# Patient Record
Sex: FEMALE | Race: WHITE | NOT HISPANIC OR LATINO | Employment: OTHER
[De-identification: names, ages, dates, MRNs, and addresses within clinical notes are randomized per-mention and may not be internally consistent; named-entity substitution may affect disease eponyms.]

---

## 2017-01-09 ENCOUNTER — RECORDS - HEALTHEAST (OUTPATIENT)
Dept: ADMINISTRATIVE | Facility: OTHER | Age: 77
End: 2017-01-09

## 2017-01-09 ENCOUNTER — COMMUNICATION - HEALTHEAST (OUTPATIENT)
Dept: SCHEDULING | Facility: CLINIC | Age: 77
End: 2017-01-09

## 2017-01-17 ENCOUNTER — OFFICE VISIT - HEALTHEAST (OUTPATIENT)
Dept: FAMILY MEDICINE | Facility: CLINIC | Age: 77
End: 2017-01-17

## 2017-01-17 DIAGNOSIS — Z01.818 PREOP GENERAL PHYSICAL EXAM: ICD-10-CM

## 2017-01-17 DIAGNOSIS — E11.9 DIABETES MELLITUS (H): ICD-10-CM

## 2017-01-17 DIAGNOSIS — Z12.31 VISIT FOR SCREENING MAMMOGRAM: ICD-10-CM

## 2017-01-17 DIAGNOSIS — K57.92 DIVERTICULITIS: ICD-10-CM

## 2017-01-17 DIAGNOSIS — H26.9 CATARACT: ICD-10-CM

## 2017-01-17 LAB — HBA1C MFR BLD: 6.8 % (ref 3.5–6)

## 2017-01-17 ASSESSMENT — MIFFLIN-ST. JEOR: SCORE: 1427.77

## 2017-01-18 ENCOUNTER — COMMUNICATION - HEALTHEAST (OUTPATIENT)
Dept: FAMILY MEDICINE | Facility: CLINIC | Age: 77
End: 2017-01-18

## 2017-01-18 DIAGNOSIS — K57.92 DIVERTICULITIS: ICD-10-CM

## 2017-04-13 ENCOUNTER — COMMUNICATION - HEALTHEAST (OUTPATIENT)
Dept: FAMILY MEDICINE | Facility: CLINIC | Age: 77
End: 2017-04-13

## 2017-05-02 ENCOUNTER — COMMUNICATION - HEALTHEAST (OUTPATIENT)
Dept: FAMILY MEDICINE | Facility: CLINIC | Age: 77
End: 2017-05-02

## 2017-05-09 ENCOUNTER — OFFICE VISIT - HEALTHEAST (OUTPATIENT)
Dept: FAMILY MEDICINE | Facility: CLINIC | Age: 77
End: 2017-05-09

## 2017-05-09 DIAGNOSIS — E11.9 DIABETES TYPE 2, CONTROLLED (H): ICD-10-CM

## 2017-05-31 ENCOUNTER — COMMUNICATION - HEALTHEAST (OUTPATIENT)
Dept: FAMILY MEDICINE | Facility: CLINIC | Age: 77
End: 2017-05-31

## 2017-05-31 DIAGNOSIS — E11.9 DIABETES TYPE 2, CONTROLLED (H): ICD-10-CM

## 2017-06-12 ENCOUNTER — COMMUNICATION - HEALTHEAST (OUTPATIENT)
Dept: FAMILY MEDICINE | Facility: CLINIC | Age: 77
End: 2017-06-12

## 2017-06-21 ENCOUNTER — AMBULATORY - HEALTHEAST (OUTPATIENT)
Dept: FAMILY MEDICINE | Facility: CLINIC | Age: 77
End: 2017-06-21

## 2017-06-21 ENCOUNTER — OFFICE VISIT - HEALTHEAST (OUTPATIENT)
Dept: FAMILY MEDICINE | Facility: CLINIC | Age: 77
End: 2017-06-21

## 2017-06-21 DIAGNOSIS — E78.00 HYPERCHOLESTEROLEMIA: ICD-10-CM

## 2017-06-21 DIAGNOSIS — E11.9 DIABETES TYPE 2, CONTROLLED (H): ICD-10-CM

## 2017-06-21 LAB
CHOLEST SERPL-MCNC: 143 MG/DL
FASTING STATUS PATIENT QL REPORTED: ABNORMAL
HBA1C MFR BLD: 7.6 % (ref 3.5–6)
HDLC SERPL-MCNC: 42 MG/DL
LDLC SERPL CALC-MCNC: 73 MG/DL

## 2017-06-21 RX ORDER — LATANOPROST 50 UG/ML
1 SOLUTION/ DROPS OPHTHALMIC AT BEDTIME
Status: SHIPPED | COMMUNITY
Start: 2017-06-12

## 2017-06-21 ASSESSMENT — MIFFLIN-ST. JEOR: SCORE: 1492.97

## 2017-06-22 ENCOUNTER — COMMUNICATION - HEALTHEAST (OUTPATIENT)
Dept: FAMILY MEDICINE | Facility: CLINIC | Age: 77
End: 2017-06-22

## 2017-07-26 ENCOUNTER — OFFICE VISIT - HEALTHEAST (OUTPATIENT)
Dept: NURSING | Facility: CLINIC | Age: 77
End: 2017-07-26

## 2017-07-26 DIAGNOSIS — E11.9 CONTROLLED TYPE 2 DIABETES MELLITUS WITHOUT COMPLICATION, WITH LONG-TERM CURRENT USE OF INSULIN (H): ICD-10-CM

## 2017-07-26 DIAGNOSIS — E11.9 DIABETES MELLITUS TYPE 2 IN NONOBESE (H): ICD-10-CM

## 2017-07-26 DIAGNOSIS — I10 ESSENTIAL HYPERTENSION: ICD-10-CM

## 2017-07-26 DIAGNOSIS — Z79.4 CONTROLLED TYPE 2 DIABETES MELLITUS WITHOUT COMPLICATION, WITH LONG-TERM CURRENT USE OF INSULIN (H): ICD-10-CM

## 2017-07-26 RX ORDER — LANCETS 30 GAUGE
EACH MISCELLANEOUS
Qty: 100 EACH | Refills: 11 | Status: SHIPPED | OUTPATIENT
Start: 2017-07-26 | End: 2023-07-20

## 2017-08-09 ENCOUNTER — COMMUNICATION - HEALTHEAST (OUTPATIENT)
Dept: FAMILY MEDICINE | Facility: CLINIC | Age: 77
End: 2017-08-09

## 2017-08-11 ENCOUNTER — COMMUNICATION - HEALTHEAST (OUTPATIENT)
Dept: FAMILY MEDICINE | Facility: CLINIC | Age: 77
End: 2017-08-11

## 2017-09-08 ENCOUNTER — COMMUNICATION - HEALTHEAST (OUTPATIENT)
Dept: NURSING | Facility: CLINIC | Age: 77
End: 2017-09-08

## 2017-09-12 ENCOUNTER — COMMUNICATION - HEALTHEAST (OUTPATIENT)
Dept: FAMILY MEDICINE | Facility: CLINIC | Age: 77
End: 2017-09-12

## 2017-09-12 DIAGNOSIS — E11.9 CONTROLLED TYPE 2 DIABETES MELLITUS WITHOUT COMPLICATION, WITH LONG-TERM CURRENT USE OF INSULIN (H): ICD-10-CM

## 2017-09-12 DIAGNOSIS — Z79.4 CONTROLLED TYPE 2 DIABETES MELLITUS WITHOUT COMPLICATION, WITH LONG-TERM CURRENT USE OF INSULIN (H): ICD-10-CM

## 2017-09-12 DIAGNOSIS — E78.00 HYPERCHOLESTEROLEMIA: ICD-10-CM

## 2017-09-14 ENCOUNTER — RECORDS - HEALTHEAST (OUTPATIENT)
Dept: ADMINISTRATIVE | Facility: OTHER | Age: 77
End: 2017-09-14

## 2017-09-14 ENCOUNTER — COMMUNICATION - HEALTHEAST (OUTPATIENT)
Dept: FAMILY MEDICINE | Facility: CLINIC | Age: 77
End: 2017-09-14

## 2017-09-18 ENCOUNTER — RECORDS - HEALTHEAST (OUTPATIENT)
Dept: ADMINISTRATIVE | Facility: OTHER | Age: 77
End: 2017-09-18

## 2017-10-03 ENCOUNTER — OFFICE VISIT - HEALTHEAST (OUTPATIENT)
Dept: FAMILY MEDICINE | Facility: CLINIC | Age: 77
End: 2017-10-03

## 2017-10-03 DIAGNOSIS — H26.9 CATARACT: ICD-10-CM

## 2017-10-03 DIAGNOSIS — Z01.818 PREOP GENERAL PHYSICAL EXAM: ICD-10-CM

## 2017-10-03 DIAGNOSIS — E11.9 DIABETES TYPE 2, CONTROLLED (H): ICD-10-CM

## 2017-10-03 LAB — HBA1C MFR BLD: 7.2 % (ref 3.5–6)

## 2017-10-03 ASSESSMENT — MIFFLIN-ST. JEOR: SCORE: 1460.92

## 2017-10-05 ENCOUNTER — COMMUNICATION - HEALTHEAST (OUTPATIENT)
Dept: FAMILY MEDICINE | Facility: CLINIC | Age: 77
End: 2017-10-05

## 2017-10-09 ENCOUNTER — RECORDS - HEALTHEAST (OUTPATIENT)
Dept: MAMMOGRAPHY | Facility: CLINIC | Age: 77
End: 2017-10-09

## 2017-10-09 DIAGNOSIS — Z12.31 ENCOUNTER FOR SCREENING MAMMOGRAM FOR MALIGNANT NEOPLASM OF BREAST: ICD-10-CM

## 2017-10-30 ENCOUNTER — COMMUNICATION - HEALTHEAST (OUTPATIENT)
Dept: FAMILY MEDICINE | Facility: CLINIC | Age: 77
End: 2017-10-30

## 2017-10-30 DIAGNOSIS — Z79.4 CONTROLLED TYPE 2 DIABETES MELLITUS WITHOUT COMPLICATION, WITH LONG-TERM CURRENT USE OF INSULIN (H): ICD-10-CM

## 2017-10-30 DIAGNOSIS — E11.9 CONTROLLED TYPE 2 DIABETES MELLITUS WITHOUT COMPLICATION, WITH LONG-TERM CURRENT USE OF INSULIN (H): ICD-10-CM

## 2017-11-02 ENCOUNTER — COMMUNICATION - HEALTHEAST (OUTPATIENT)
Dept: FAMILY MEDICINE | Facility: CLINIC | Age: 77
End: 2017-11-02

## 2017-11-02 DIAGNOSIS — E11.9 CONTROLLED TYPE 2 DIABETES MELLITUS WITHOUT COMPLICATION, WITH LONG-TERM CURRENT USE OF INSULIN (H): ICD-10-CM

## 2017-11-02 DIAGNOSIS — Z79.4 CONTROLLED TYPE 2 DIABETES MELLITUS WITHOUT COMPLICATION, WITH LONG-TERM CURRENT USE OF INSULIN (H): ICD-10-CM

## 2017-12-06 ENCOUNTER — COMMUNICATION - HEALTHEAST (OUTPATIENT)
Dept: FAMILY MEDICINE | Facility: CLINIC | Age: 77
End: 2017-12-06

## 2017-12-06 DIAGNOSIS — Z79.4 CONTROLLED TYPE 2 DIABETES MELLITUS WITHOUT COMPLICATION, WITH LONG-TERM CURRENT USE OF INSULIN (H): ICD-10-CM

## 2017-12-06 DIAGNOSIS — E11.9 CONTROLLED TYPE 2 DIABETES MELLITUS WITHOUT COMPLICATION, WITH LONG-TERM CURRENT USE OF INSULIN (H): ICD-10-CM

## 2018-01-02 ENCOUNTER — COMMUNICATION - HEALTHEAST (OUTPATIENT)
Dept: NURSING | Facility: CLINIC | Age: 78
End: 2018-01-02

## 2018-02-08 ENCOUNTER — OFFICE VISIT - HEALTHEAST (OUTPATIENT)
Dept: FAMILY MEDICINE | Facility: CLINIC | Age: 78
End: 2018-02-08

## 2018-02-08 ENCOUNTER — AMBULATORY - HEALTHEAST (OUTPATIENT)
Dept: FAMILY MEDICINE | Facility: CLINIC | Age: 78
End: 2018-02-08

## 2018-02-08 DIAGNOSIS — M25.50 JOINT PAIN: ICD-10-CM

## 2018-02-08 DIAGNOSIS — E11.9 DIABETES TYPE 2, CONTROLLED (H): ICD-10-CM

## 2018-02-08 DIAGNOSIS — Z00.00 ROUTINE GENERAL MEDICAL EXAMINATION AT A HEALTH CARE FACILITY: ICD-10-CM

## 2018-02-08 LAB — HBA1C MFR BLD: 7.4 % (ref 3.5–6)

## 2018-02-08 ASSESSMENT — MIFFLIN-ST. JEOR: SCORE: 1479.04

## 2018-03-09 ENCOUNTER — COMMUNICATION - HEALTHEAST (OUTPATIENT)
Dept: FAMILY MEDICINE | Facility: CLINIC | Age: 78
End: 2018-03-09

## 2018-03-09 DIAGNOSIS — E11.9 CONTROLLED TYPE 2 DIABETES MELLITUS WITHOUT COMPLICATION, WITH LONG-TERM CURRENT USE OF INSULIN (H): ICD-10-CM

## 2018-03-09 DIAGNOSIS — Z79.4 CONTROLLED TYPE 2 DIABETES MELLITUS WITHOUT COMPLICATION, WITH LONG-TERM CURRENT USE OF INSULIN (H): ICD-10-CM

## 2018-04-09 ENCOUNTER — COMMUNICATION - HEALTHEAST (OUTPATIENT)
Dept: FAMILY MEDICINE | Facility: CLINIC | Age: 78
End: 2018-04-09

## 2018-04-11 ENCOUNTER — COMMUNICATION - HEALTHEAST (OUTPATIENT)
Dept: FAMILY MEDICINE | Facility: CLINIC | Age: 78
End: 2018-04-11

## 2018-04-22 ENCOUNTER — RECORDS - HEALTHEAST (OUTPATIENT)
Dept: ADMINISTRATIVE | Facility: OTHER | Age: 78
End: 2018-04-22

## 2018-04-24 ENCOUNTER — COMMUNICATION - HEALTHEAST (OUTPATIENT)
Dept: SCHEDULING | Facility: CLINIC | Age: 78
End: 2018-04-24

## 2018-04-24 ENCOUNTER — OFFICE VISIT - HEALTHEAST (OUTPATIENT)
Dept: FAMILY MEDICINE | Facility: CLINIC | Age: 78
End: 2018-04-24

## 2018-04-24 DIAGNOSIS — R06.02 SOB (SHORTNESS OF BREATH): ICD-10-CM

## 2018-04-24 ASSESSMENT — MIFFLIN-ST. JEOR: SCORE: 1471.98

## 2018-05-07 ENCOUNTER — HOSPITAL ENCOUNTER (OUTPATIENT)
Dept: RESPIRATORY THERAPY | Facility: HOSPITAL | Age: 78
Discharge: HOME | End: 2018-05-07
Attending: FAMILY MEDICINE

## 2018-05-07 DIAGNOSIS — R06.02 SOB (SHORTNESS OF BREATH): ICD-10-CM

## 2018-05-07 LAB — HGB BLD-MCNC: 14.1 G/DL (ref 12–16)

## 2018-05-10 ENCOUNTER — AMBULATORY - HEALTHEAST (OUTPATIENT)
Dept: PODIATRY | Facility: CLINIC | Age: 78
End: 2018-05-10

## 2018-05-10 DIAGNOSIS — E11.9 TYPE 2 DIABETES MELLITUS WITHOUT COMPLICATION, WITH LONG-TERM CURRENT USE OF INSULIN (H): ICD-10-CM

## 2018-05-10 DIAGNOSIS — Z79.4 TYPE 2 DIABETES MELLITUS WITHOUT COMPLICATION, WITH LONG-TERM CURRENT USE OF INSULIN (H): ICD-10-CM

## 2018-05-10 DIAGNOSIS — B35.1 NAIL FUNGUS: ICD-10-CM

## 2018-05-10 DIAGNOSIS — L60.2 ONYCHAUXIS: ICD-10-CM

## 2018-05-16 ENCOUNTER — AMBULATORY - HEALTHEAST (OUTPATIENT)
Dept: LAB | Facility: CLINIC | Age: 78
End: 2018-05-16

## 2018-05-16 ENCOUNTER — OFFICE VISIT - HEALTHEAST (OUTPATIENT)
Dept: PHARMACY | Facility: CLINIC | Age: 78
End: 2018-05-16

## 2018-05-16 ENCOUNTER — AMBULATORY - HEALTHEAST (OUTPATIENT)
Dept: PHARMACY | Facility: CLINIC | Age: 78
End: 2018-05-16

## 2018-05-16 DIAGNOSIS — Z79.4 CONTROLLED TYPE 2 DIABETES MELLITUS WITHOUT COMPLICATION, WITH LONG-TERM CURRENT USE OF INSULIN (H): ICD-10-CM

## 2018-05-16 DIAGNOSIS — E11.9 DIABETES MELLITUS (H): ICD-10-CM

## 2018-05-16 DIAGNOSIS — E11.9 CONTROLLED TYPE 2 DIABETES MELLITUS WITHOUT COMPLICATION, WITH LONG-TERM CURRENT USE OF INSULIN (H): ICD-10-CM

## 2018-05-16 LAB — HBA1C MFR BLD: 7.7 % (ref 3.5–6)

## 2018-05-23 ENCOUNTER — RECORDS - HEALTHEAST (OUTPATIENT)
Dept: ADMINISTRATIVE | Facility: OTHER | Age: 78
End: 2018-05-23

## 2018-05-25 ENCOUNTER — OFFICE VISIT - HEALTHEAST (OUTPATIENT)
Dept: EDUCATION SERVICES | Facility: CLINIC | Age: 78
End: 2018-05-25

## 2018-05-25 DIAGNOSIS — E11.9 DIABETES (H): ICD-10-CM

## 2018-05-27 ENCOUNTER — RECORDS - HEALTHEAST (OUTPATIENT)
Dept: ADMINISTRATIVE | Facility: OTHER | Age: 78
End: 2018-05-27

## 2018-05-29 ENCOUNTER — RECORDS - HEALTHEAST (OUTPATIENT)
Dept: ADMINISTRATIVE | Facility: OTHER | Age: 78
End: 2018-05-29

## 2018-05-30 ENCOUNTER — RECORDS - HEALTHEAST (OUTPATIENT)
Dept: ADMINISTRATIVE | Facility: OTHER | Age: 78
End: 2018-05-30

## 2018-06-08 ENCOUNTER — RECORDS - HEALTHEAST (OUTPATIENT)
Dept: ADMINISTRATIVE | Facility: OTHER | Age: 78
End: 2018-06-08

## 2018-06-13 ENCOUNTER — RECORDS - HEALTHEAST (OUTPATIENT)
Dept: ADMINISTRATIVE | Facility: OTHER | Age: 78
End: 2018-06-13

## 2018-06-25 ENCOUNTER — COMMUNICATION - HEALTHEAST (OUTPATIENT)
Dept: FAMILY MEDICINE | Facility: CLINIC | Age: 78
End: 2018-06-25

## 2018-06-25 DIAGNOSIS — E78.00 HYPERCHOLESTEROLEMIA: ICD-10-CM

## 2018-06-27 ENCOUNTER — RECORDS - HEALTHEAST (OUTPATIENT)
Dept: ADMINISTRATIVE | Facility: OTHER | Age: 78
End: 2018-06-27

## 2018-07-09 ENCOUNTER — OFFICE VISIT - HEALTHEAST (OUTPATIENT)
Dept: FAMILY MEDICINE | Facility: CLINIC | Age: 78
End: 2018-07-09

## 2018-07-09 DIAGNOSIS — R06.02 SOB (SHORTNESS OF BREATH): ICD-10-CM

## 2018-07-11 ENCOUNTER — OFFICE VISIT - HEALTHEAST (OUTPATIENT)
Dept: EDUCATION SERVICES | Facility: CLINIC | Age: 78
End: 2018-07-11

## 2018-07-11 DIAGNOSIS — E11.65 CONTROLLED TYPE 2 DIABETES MELLITUS WITH HYPERGLYCEMIA, WITH LONG-TERM CURRENT USE OF INSULIN (H): ICD-10-CM

## 2018-07-11 DIAGNOSIS — Z79.4 CONTROLLED TYPE 2 DIABETES MELLITUS WITH HYPERGLYCEMIA, WITH LONG-TERM CURRENT USE OF INSULIN (H): ICD-10-CM

## 2018-07-16 ENCOUNTER — COMMUNICATION - HEALTHEAST (OUTPATIENT)
Dept: EDUCATION SERVICES | Facility: CLINIC | Age: 78
End: 2018-07-16

## 2018-07-20 ENCOUNTER — RECORDS - HEALTHEAST (OUTPATIENT)
Dept: ADMINISTRATIVE | Facility: OTHER | Age: 78
End: 2018-07-20

## 2018-07-30 ENCOUNTER — COMMUNICATION - HEALTHEAST (OUTPATIENT)
Dept: NURSING | Facility: CLINIC | Age: 78
End: 2018-07-30

## 2018-07-30 DIAGNOSIS — I10 ESSENTIAL HYPERTENSION: ICD-10-CM

## 2018-08-08 ENCOUNTER — OFFICE VISIT - HEALTHEAST (OUTPATIENT)
Dept: EDUCATION SERVICES | Facility: CLINIC | Age: 78
End: 2018-08-08

## 2018-08-08 DIAGNOSIS — E11.9 DIABETES TYPE 2, CONTROLLED (H): ICD-10-CM

## 2018-09-05 ENCOUNTER — OFFICE VISIT - HEALTHEAST (OUTPATIENT)
Dept: EDUCATION SERVICES | Facility: CLINIC | Age: 78
End: 2018-09-05

## 2018-09-05 DIAGNOSIS — E11.9 DIABETES TYPE 2, CONTROLLED (H): ICD-10-CM

## 2018-09-14 ENCOUNTER — COMMUNICATION - HEALTHEAST (OUTPATIENT)
Dept: FAMILY MEDICINE | Facility: CLINIC | Age: 78
End: 2018-09-14

## 2018-09-14 DIAGNOSIS — E11.9 DIABETES TYPE 2, CONTROLLED (H): ICD-10-CM

## 2018-10-10 ENCOUNTER — AMBULATORY - HEALTHEAST (OUTPATIENT)
Dept: FAMILY MEDICINE | Facility: CLINIC | Age: 78
End: 2018-10-10

## 2018-10-10 ENCOUNTER — OFFICE VISIT - HEALTHEAST (OUTPATIENT)
Dept: FAMILY MEDICINE | Facility: CLINIC | Age: 78
End: 2018-10-10

## 2018-10-10 DIAGNOSIS — Z23 FLU VACCINE NEED: ICD-10-CM

## 2018-10-10 DIAGNOSIS — Z12.31 VISIT FOR SCREENING MAMMOGRAM: ICD-10-CM

## 2018-10-10 DIAGNOSIS — M54.50 CHRONIC BILATERAL LOW BACK PAIN WITHOUT SCIATICA: ICD-10-CM

## 2018-10-10 DIAGNOSIS — G89.29 CHRONIC BILATERAL LOW BACK PAIN WITHOUT SCIATICA: ICD-10-CM

## 2018-10-10 DIAGNOSIS — I10 ESSENTIAL HYPERTENSION: ICD-10-CM

## 2018-10-10 LAB
ALBUMIN SERPL-MCNC: 4.1 G/DL (ref 3.5–5)
ALP SERPL-CCNC: 79 U/L (ref 45–120)
ALT SERPL W P-5'-P-CCNC: 34 U/L (ref 0–45)
ANION GAP SERPL CALCULATED.3IONS-SCNC: 11 MMOL/L (ref 5–18)
AST SERPL W P-5'-P-CCNC: 32 U/L (ref 0–40)
BILIRUB SERPL-MCNC: 0.8 MG/DL (ref 0–1)
BUN SERPL-MCNC: 16 MG/DL (ref 8–28)
CALCIUM SERPL-MCNC: 9.4 MG/DL (ref 8.5–10.5)
CHLORIDE BLD-SCNC: 103 MMOL/L (ref 98–107)
CHOLEST SERPL-MCNC: 130 MG/DL
CO2 SERPL-SCNC: 25 MMOL/L (ref 22–31)
CREAT SERPL-MCNC: 0.84 MG/DL (ref 0.6–1.1)
FASTING STATUS PATIENT QL REPORTED: YES
GFR SERPL CREATININE-BSD FRML MDRD: >60 ML/MIN/1.73M2
GLUCOSE BLD-MCNC: 139 MG/DL (ref 70–125)
HBA1C MFR BLD: 7.5 % (ref 3.5–6)
HDLC SERPL-MCNC: 36 MG/DL
LDLC SERPL CALC-MCNC: 63 MG/DL
MAGNESIUM SERPL-MCNC: 1.9 MG/DL (ref 1.8–2.6)
POTASSIUM BLD-SCNC: 4 MMOL/L (ref 3.5–5)
PROT SERPL-MCNC: 7.2 G/DL (ref 6–8)
SODIUM SERPL-SCNC: 139 MMOL/L (ref 136–145)
TRIGL SERPL-MCNC: 154 MG/DL

## 2018-10-10 ASSESSMENT — MIFFLIN-ST. JEOR: SCORE: 1426.62

## 2018-10-11 ENCOUNTER — COMMUNICATION - HEALTHEAST (OUTPATIENT)
Dept: FAMILY MEDICINE | Facility: CLINIC | Age: 78
End: 2018-10-11

## 2018-10-23 ENCOUNTER — COMMUNICATION - HEALTHEAST (OUTPATIENT)
Dept: PHARMACY | Facility: CLINIC | Age: 78
End: 2018-10-23

## 2018-11-01 ENCOUNTER — COMMUNICATION - HEALTHEAST (OUTPATIENT)
Dept: NURSING | Facility: CLINIC | Age: 78
End: 2018-11-01

## 2018-11-01 DIAGNOSIS — I10 ESSENTIAL HYPERTENSION: ICD-10-CM

## 2018-11-01 DIAGNOSIS — E11.9 DIABETES TYPE 2, CONTROLLED (H): ICD-10-CM

## 2018-11-21 ENCOUNTER — RECORDS - HEALTHEAST (OUTPATIENT)
Dept: ADMINISTRATIVE | Facility: OTHER | Age: 78
End: 2018-11-21

## 2018-12-05 ENCOUNTER — OFFICE VISIT - HEALTHEAST (OUTPATIENT)
Dept: FAMILY MEDICINE | Facility: CLINIC | Age: 78
End: 2018-12-05

## 2018-12-05 DIAGNOSIS — R05.9 COUGH: ICD-10-CM

## 2018-12-05 DIAGNOSIS — L65.9 HAIR LOSS: ICD-10-CM

## 2018-12-05 DIAGNOSIS — Z78.0 POST-MENOPAUSAL: ICD-10-CM

## 2018-12-05 LAB
T3FREE SERPL-MCNC: 3.1 PG/ML (ref 1.9–3.9)
T4 FREE SERPL-MCNC: 0.8 NG/DL (ref 0.7–1.8)
TSH SERPL DL<=0.005 MIU/L-ACNC: 1.95 UIU/ML (ref 0.3–5)

## 2018-12-05 ASSESSMENT — MIFFLIN-ST. JEOR: SCORE: 1475.39

## 2018-12-11 ENCOUNTER — COMMUNICATION - HEALTHEAST (OUTPATIENT)
Dept: FAMILY MEDICINE | Facility: CLINIC | Age: 78
End: 2018-12-11

## 2018-12-11 DIAGNOSIS — E11.9 CONTROLLED TYPE 2 DIABETES MELLITUS WITHOUT COMPLICATION, WITH LONG-TERM CURRENT USE OF INSULIN (H): ICD-10-CM

## 2018-12-11 DIAGNOSIS — Z79.4 CONTROLLED TYPE 2 DIABETES MELLITUS WITHOUT COMPLICATION, WITH LONG-TERM CURRENT USE OF INSULIN (H): ICD-10-CM

## 2019-02-20 ENCOUNTER — COMMUNICATION - HEALTHEAST (OUTPATIENT)
Dept: FAMILY MEDICINE | Facility: CLINIC | Age: 79
End: 2019-02-20

## 2019-02-20 DIAGNOSIS — E11.9 DIABETES TYPE 2, CONTROLLED (H): ICD-10-CM

## 2019-03-06 ENCOUNTER — AMBULATORY - HEALTHEAST (OUTPATIENT)
Dept: PODIATRY | Facility: CLINIC | Age: 79
End: 2019-03-06

## 2019-03-06 RX ORDER — CLOBETASOL PROPIONATE 0.5 MG/ML
SOLUTION TOPICAL
Status: SHIPPED | COMMUNITY
Start: 2018-12-26

## 2019-03-07 ENCOUNTER — OFFICE VISIT - HEALTHEAST (OUTPATIENT)
Dept: PODIATRY | Facility: CLINIC | Age: 79
End: 2019-03-07

## 2019-03-07 DIAGNOSIS — L84 PRE-ULCERATIVE CALLUSES: ICD-10-CM

## 2019-03-07 ASSESSMENT — MIFFLIN-ST. JEOR: SCORE: 1471.98

## 2019-03-19 ENCOUNTER — COMMUNICATION - HEALTHEAST (OUTPATIENT)
Dept: FAMILY MEDICINE | Facility: CLINIC | Age: 79
End: 2019-03-19

## 2019-03-19 DIAGNOSIS — E78.00 HYPERCHOLESTEROLEMIA: ICD-10-CM

## 2019-03-20 ENCOUNTER — RECORDS - HEALTHEAST (OUTPATIENT)
Dept: ADMINISTRATIVE | Facility: OTHER | Age: 79
End: 2019-03-20

## 2019-03-21 ENCOUNTER — RECORDS - HEALTHEAST (OUTPATIENT)
Dept: ADMINISTRATIVE | Facility: OTHER | Age: 79
End: 2019-03-21

## 2019-04-02 ENCOUNTER — RECORDS - HEALTHEAST (OUTPATIENT)
Dept: ADMINISTRATIVE | Facility: OTHER | Age: 79
End: 2019-04-02

## 2019-04-05 ENCOUNTER — AMBULATORY - HEALTHEAST (OUTPATIENT)
Dept: FAMILY MEDICINE | Facility: CLINIC | Age: 79
End: 2019-04-05

## 2019-04-05 ENCOUNTER — OFFICE VISIT - HEALTHEAST (OUTPATIENT)
Dept: FAMILY MEDICINE | Facility: CLINIC | Age: 79
End: 2019-04-05

## 2019-04-05 DIAGNOSIS — Z12.31 ENCOUNTER FOR SCREENING MAMMOGRAM FOR BREAST CANCER: ICD-10-CM

## 2019-04-05 DIAGNOSIS — E11.9 DIABETES TYPE 2, CONTROLLED (H): ICD-10-CM

## 2019-04-05 DIAGNOSIS — Z00.00 ROUTINE GENERAL MEDICAL EXAMINATION AT A HEALTH CARE FACILITY: ICD-10-CM

## 2019-04-05 LAB
CREAT UR-MCNC: 122.5 MG/DL
HBA1C MFR BLD: 8 % (ref 3.5–6)
MICROALBUMIN UR-MCNC: 2.43 MG/DL (ref 0–1.99)
MICROALBUMIN/CREAT UR: 19.8 MG/G

## 2019-04-05 ASSESSMENT — MIFFLIN-ST. JEOR: SCORE: 1484.68

## 2019-04-19 ENCOUNTER — RECORDS - HEALTHEAST (OUTPATIENT)
Dept: MAMMOGRAPHY | Facility: CLINIC | Age: 79
End: 2019-04-19

## 2019-04-19 DIAGNOSIS — Z12.31 ENCOUNTER FOR SCREENING MAMMOGRAM FOR MALIGNANT NEOPLASM OF BREAST: ICD-10-CM

## 2019-05-29 ENCOUNTER — COMMUNICATION - HEALTHEAST (OUTPATIENT)
Dept: FAMILY MEDICINE | Facility: CLINIC | Age: 79
End: 2019-05-29

## 2019-06-19 ENCOUNTER — RECORDS - HEALTHEAST (OUTPATIENT)
Dept: ADMINISTRATIVE | Facility: OTHER | Age: 79
End: 2019-06-19

## 2019-06-28 ENCOUNTER — COMMUNICATION - HEALTHEAST (OUTPATIENT)
Dept: FAMILY MEDICINE | Facility: CLINIC | Age: 79
End: 2019-06-28

## 2019-06-28 DIAGNOSIS — E78.00 HYPERCHOLESTEROLEMIA: ICD-10-CM

## 2019-07-02 ENCOUNTER — RECORDS - HEALTHEAST (OUTPATIENT)
Dept: HEALTH INFORMATION MANAGEMENT | Facility: CLINIC | Age: 79
End: 2019-07-02

## 2019-07-12 ENCOUNTER — RECORDS - HEALTHEAST (OUTPATIENT)
Dept: GENERAL RADIOLOGY | Facility: CLINIC | Age: 79
End: 2019-07-12

## 2019-07-12 ENCOUNTER — OFFICE VISIT - HEALTHEAST (OUTPATIENT)
Dept: FAMILY MEDICINE | Facility: CLINIC | Age: 79
End: 2019-07-12

## 2019-07-12 DIAGNOSIS — M89.8X5 OTHER SPECIFIED DISORDERS OF BONE, THIGH: ICD-10-CM

## 2019-07-12 DIAGNOSIS — I10 ESSENTIAL HYPERTENSION: ICD-10-CM

## 2019-07-12 DIAGNOSIS — M89.8X5 PAIN OF LEFT FEMUR: ICD-10-CM

## 2019-07-12 DIAGNOSIS — E11.9 DIABETES MELLITUS TYPE 2 IN NONOBESE (H): ICD-10-CM

## 2019-07-12 LAB — HBA1C MFR BLD: 7.6 % (ref 3.5–6)

## 2019-07-12 ASSESSMENT — MIFFLIN-ST. JEOR: SCORE: 1484.46

## 2019-07-16 ENCOUNTER — OFFICE VISIT - HEALTHEAST (OUTPATIENT)
Dept: PHYSICAL THERAPY | Facility: REHABILITATION | Age: 79
End: 2019-07-16

## 2019-07-16 DIAGNOSIS — R26.89 ANTALGIC GAIT: ICD-10-CM

## 2019-07-16 DIAGNOSIS — M79.652 ACUTE PAIN OF LEFT THIGH: ICD-10-CM

## 2019-07-16 DIAGNOSIS — M62.81 GENERALIZED MUSCLE WEAKNESS: ICD-10-CM

## 2019-07-19 ENCOUNTER — OFFICE VISIT - HEALTHEAST (OUTPATIENT)
Dept: PHYSICAL THERAPY | Facility: REHABILITATION | Age: 79
End: 2019-07-19

## 2019-07-19 DIAGNOSIS — M79.652 ACUTE PAIN OF LEFT THIGH: ICD-10-CM

## 2019-07-19 DIAGNOSIS — M62.81 GENERALIZED MUSCLE WEAKNESS: ICD-10-CM

## 2019-07-19 DIAGNOSIS — R26.89 ANTALGIC GAIT: ICD-10-CM

## 2019-07-22 ENCOUNTER — OFFICE VISIT - HEALTHEAST (OUTPATIENT)
Dept: PHYSICAL THERAPY | Facility: REHABILITATION | Age: 79
End: 2019-07-22

## 2019-07-22 DIAGNOSIS — M62.81 GENERALIZED MUSCLE WEAKNESS: ICD-10-CM

## 2019-07-22 DIAGNOSIS — R26.89 ANTALGIC GAIT: ICD-10-CM

## 2019-07-22 DIAGNOSIS — M79.652 ACUTE PAIN OF LEFT THIGH: ICD-10-CM

## 2019-07-29 ENCOUNTER — RECORDS - HEALTHEAST (OUTPATIENT)
Dept: ADMINISTRATIVE | Facility: OTHER | Age: 79
End: 2019-07-29

## 2019-08-02 ENCOUNTER — COMMUNICATION - HEALTHEAST (OUTPATIENT)
Dept: LAB | Facility: CLINIC | Age: 79
End: 2019-08-02

## 2019-08-02 ENCOUNTER — AMBULATORY - HEALTHEAST (OUTPATIENT)
Dept: LAB | Facility: CLINIC | Age: 79
End: 2019-08-02

## 2019-08-02 DIAGNOSIS — Z96.642 PRESENCE OF LEFT ARTIFICIAL HIP JOINT: ICD-10-CM

## 2019-08-02 DIAGNOSIS — T84.84XA PAIN DUE TO HIP JOINT PROSTHESIS (H): ICD-10-CM

## 2019-08-02 DIAGNOSIS — M25.552 LEFT HIP PAIN: ICD-10-CM

## 2019-08-02 DIAGNOSIS — Z47.1 AFTERCARE FOLLOWING JOINT REPLACEMENT: ICD-10-CM

## 2019-08-02 DIAGNOSIS — Z96.649 PAIN DUE TO HIP JOINT PROSTHESIS (H): ICD-10-CM

## 2019-08-02 DIAGNOSIS — M25.559 HIP PAIN: ICD-10-CM

## 2019-08-02 DIAGNOSIS — T56.2X4A TOXIC EFFECT OF CHROMIUM AND ITS COMPOUNDS, UNDETERMINED, INITIAL ENCOUNTER: ICD-10-CM

## 2019-08-02 LAB
C REACTIVE PROTEIN LHE: 0.2 MG/DL (ref 0–0.8)
ERYTHROCYTE [DISTWIDTH] IN BLOOD BY AUTOMATED COUNT: 12.1 % (ref 11–14.5)
ERYTHROCYTE [SEDIMENTATION RATE] IN BLOOD BY WESTERGREN METHOD: 17 MM/HR (ref 0–20)
HCT VFR BLD AUTO: 42.7 % (ref 35–47)
HGB BLD-MCNC: 14.1 G/DL (ref 12–16)
MCH RBC QN AUTO: 31.2 PG (ref 27–34)
MCHC RBC AUTO-ENTMCNC: 33 G/DL (ref 32–36)
MCV RBC AUTO: 94 FL (ref 80–100)
PLATELET # BLD AUTO: 284 THOU/UL (ref 140–440)
PMV BLD AUTO: 7.8 FL (ref 7–10)
RBC # BLD AUTO: 4.53 MILL/UL (ref 3.8–5.4)
WBC: 6.8 THOU/UL (ref 4–11)

## 2019-08-04 LAB
ARUP MISCELLANEOUS TEST: NORMAL
MISCELLANEOUS TEST DEPT. - HE HISTORICAL: NORMAL
PERFORMING LAB: NORMAL
SPECIMEN STATUS: NORMAL
TEST NAME: NORMAL

## 2019-08-06 LAB — CR SERPL-MCNC: <1 UG/L

## 2019-08-07 ENCOUNTER — COMMUNICATION - HEALTHEAST (OUTPATIENT)
Dept: FAMILY MEDICINE | Facility: CLINIC | Age: 79
End: 2019-08-07

## 2019-08-12 ENCOUNTER — COMMUNICATION - HEALTHEAST (OUTPATIENT)
Dept: FAMILY MEDICINE | Facility: CLINIC | Age: 79
End: 2019-08-12

## 2019-08-14 ENCOUNTER — RECORDS - HEALTHEAST (OUTPATIENT)
Dept: ADMINISTRATIVE | Facility: OTHER | Age: 79
End: 2019-08-14

## 2019-08-15 ENCOUNTER — COMMUNICATION - HEALTHEAST (OUTPATIENT)
Dept: FAMILY MEDICINE | Facility: CLINIC | Age: 79
End: 2019-08-15

## 2019-10-02 ENCOUNTER — RECORDS - HEALTHEAST (OUTPATIENT)
Dept: ADMINISTRATIVE | Facility: OTHER | Age: 79
End: 2019-10-02

## 2019-10-14 ENCOUNTER — OFFICE VISIT - HEALTHEAST (OUTPATIENT)
Dept: FAMILY MEDICINE | Facility: CLINIC | Age: 79
End: 2019-10-14

## 2019-10-14 DIAGNOSIS — M54.50 CHRONIC BILATERAL LOW BACK PAIN WITHOUT SCIATICA: ICD-10-CM

## 2019-10-14 DIAGNOSIS — E11.9 CONTROLLED TYPE 2 DIABETES MELLITUS WITHOUT COMPLICATION, WITH LONG-TERM CURRENT USE OF INSULIN (H): ICD-10-CM

## 2019-10-14 DIAGNOSIS — Z79.4 CONTROLLED TYPE 2 DIABETES MELLITUS WITHOUT COMPLICATION, WITH LONG-TERM CURRENT USE OF INSULIN (H): ICD-10-CM

## 2019-10-14 DIAGNOSIS — G89.29 CHRONIC BILATERAL LOW BACK PAIN WITHOUT SCIATICA: ICD-10-CM

## 2019-10-14 LAB
ALBUMIN SERPL-MCNC: 4.1 G/DL (ref 3.5–5)
ALP SERPL-CCNC: 90 U/L (ref 45–120)
ALT SERPL W P-5'-P-CCNC: 39 U/L (ref 0–45)
ANION GAP SERPL CALCULATED.3IONS-SCNC: 10 MMOL/L (ref 5–18)
AST SERPL W P-5'-P-CCNC: 35 U/L (ref 0–40)
BILIRUB SERPL-MCNC: 0.3 MG/DL (ref 0–1)
BUN SERPL-MCNC: 12 MG/DL (ref 8–28)
CALCIUM SERPL-MCNC: 9.8 MG/DL (ref 8.5–10.5)
CHLORIDE BLD-SCNC: 104 MMOL/L (ref 98–107)
CO2 SERPL-SCNC: 26 MMOL/L (ref 22–31)
CREAT SERPL-MCNC: 1.08 MG/DL (ref 0.6–1.1)
GFR SERPL CREATININE-BSD FRML MDRD: 49 ML/MIN/1.73M2
GLUCOSE BLD-MCNC: 178 MG/DL (ref 70–125)
HBA1C MFR BLD: 7.3 % (ref 3.5–6)
LDLC SERPL CALC-MCNC: 82 MG/DL
POTASSIUM BLD-SCNC: 4.6 MMOL/L (ref 3.5–5)
PROT SERPL-MCNC: 7.3 G/DL (ref 6–8)
SODIUM SERPL-SCNC: 140 MMOL/L (ref 136–145)

## 2019-10-14 RX ORDER — BLOOD SUGAR DIAGNOSTIC
STRIP MISCELLANEOUS
Qty: 300 EACH | Refills: 5 | Status: SHIPPED | OUTPATIENT
Start: 2019-10-14 | End: 2023-07-20

## 2019-10-14 ASSESSMENT — MIFFLIN-ST. JEOR: SCORE: 1452.71

## 2019-12-11 ENCOUNTER — COMMUNICATION - HEALTHEAST (OUTPATIENT)
Dept: FAMILY MEDICINE | Facility: CLINIC | Age: 79
End: 2019-12-11

## 2019-12-11 DIAGNOSIS — E11.9 DIABETES MELLITUS TYPE 2 IN NONOBESE (H): ICD-10-CM

## 2019-12-11 RX ORDER — GLUCOSAMINE HCL/CHONDROITIN SU 500-400 MG
1 CAPSULE ORAL 4 TIMES DAILY
Qty: 400 STRIP | Refills: 3 | Status: SHIPPED | OUTPATIENT
Start: 2019-12-11 | End: 2023-07-20

## 2020-02-10 ENCOUNTER — COMMUNICATION - HEALTHEAST (OUTPATIENT)
Dept: FAMILY MEDICINE | Facility: CLINIC | Age: 80
End: 2020-02-10

## 2020-02-10 ENCOUNTER — AMBULATORY - HEALTHEAST (OUTPATIENT)
Dept: FAMILY MEDICINE | Facility: CLINIC | Age: 80
End: 2020-02-10

## 2020-02-10 ENCOUNTER — OFFICE VISIT - HEALTHEAST (OUTPATIENT)
Dept: FAMILY MEDICINE | Facility: CLINIC | Age: 80
End: 2020-02-10

## 2020-02-10 DIAGNOSIS — E11.65 UNCONTROLLED TYPE 2 DIABETES MELLITUS WITH HYPERGLYCEMIA (H): ICD-10-CM

## 2020-02-10 DIAGNOSIS — I10 ESSENTIAL HYPERTENSION: ICD-10-CM

## 2020-02-10 DIAGNOSIS — M24.541 CONTRACTURE OF HAND JOINT, RIGHT: ICD-10-CM

## 2020-02-10 LAB
ANION GAP SERPL CALCULATED.3IONS-SCNC: 13 MMOL/L (ref 5–18)
BUN SERPL-MCNC: 23 MG/DL (ref 8–28)
CALCIUM SERPL-MCNC: 9.4 MG/DL (ref 8.5–10.5)
CHLORIDE BLD-SCNC: 101 MMOL/L (ref 98–107)
CO2 SERPL-SCNC: 26 MMOL/L (ref 22–31)
CREAT SERPL-MCNC: 0.96 MG/DL (ref 0.6–1.1)
GFR SERPL CREATININE-BSD FRML MDRD: 56 ML/MIN/1.73M2
GLUCOSE BLD-MCNC: 145 MG/DL (ref 70–125)
HBA1C MFR BLD: 7.4 % (ref 3.5–6)
MAGNESIUM SERPL-MCNC: 1.8 MG/DL (ref 1.8–2.6)
POTASSIUM BLD-SCNC: 4.4 MMOL/L (ref 3.5–5)
SODIUM SERPL-SCNC: 140 MMOL/L (ref 136–145)

## 2020-03-03 ENCOUNTER — COMMUNICATION - HEALTHEAST (OUTPATIENT)
Dept: SCHEDULING | Facility: CLINIC | Age: 80
End: 2020-03-03

## 2020-03-04 ENCOUNTER — AMBULATORY - HEALTHEAST (OUTPATIENT)
Dept: CARE COORDINATION | Facility: CLINIC | Age: 80
End: 2020-03-04

## 2020-03-04 ENCOUNTER — RECORDS - HEALTHEAST (OUTPATIENT)
Dept: ADMINISTRATIVE | Facility: OTHER | Age: 80
End: 2020-03-04

## 2020-03-04 DIAGNOSIS — G89.29 CHRONIC LOW BACK PAIN: ICD-10-CM

## 2020-03-04 DIAGNOSIS — E66.01 MORBID OBESITY (H): ICD-10-CM

## 2020-03-04 DIAGNOSIS — M54.50 CHRONIC LOW BACK PAIN: ICD-10-CM

## 2020-03-04 DIAGNOSIS — I10 HYPERTENSION: ICD-10-CM

## 2020-03-04 LAB — RETINOPATHY: POSITIVE

## 2020-03-10 ENCOUNTER — COMMUNICATION - HEALTHEAST (OUTPATIENT)
Dept: NURSING | Facility: CLINIC | Age: 80
End: 2020-03-10

## 2020-03-10 ENCOUNTER — RECORDS - HEALTHEAST (OUTPATIENT)
Dept: HEALTH INFORMATION MANAGEMENT | Facility: CLINIC | Age: 80
End: 2020-03-10

## 2020-03-11 ENCOUNTER — OFFICE VISIT - HEALTHEAST (OUTPATIENT)
Dept: FAMILY MEDICINE | Facility: CLINIC | Age: 80
End: 2020-03-11

## 2020-03-11 DIAGNOSIS — E11.65 UNCONTROLLED TYPE 2 DIABETES MELLITUS WITH HYPERGLYCEMIA (H): ICD-10-CM

## 2020-03-11 DIAGNOSIS — R06.00 DYSPNEA, UNSPECIFIED TYPE: ICD-10-CM

## 2020-03-11 DIAGNOSIS — F41.9 ANXIETY: ICD-10-CM

## 2020-03-11 LAB
CREAT UR-MCNC: 39.2 MG/DL
MICROALBUMIN UR-MCNC: 1.04 MG/DL (ref 0–1.99)
MICROALBUMIN/CREAT UR: 26.5 MG/G

## 2020-03-11 RX ORDER — IBUPROFEN 600 MG/1
TABLET, FILM COATED ORAL
Status: SHIPPED | COMMUNITY
Start: 2019-10-29

## 2020-03-11 ASSESSMENT — MIFFLIN-ST. JEOR: SCORE: 1481.06

## 2020-04-29 ENCOUNTER — COMMUNICATION - HEALTHEAST (OUTPATIENT)
Dept: FAMILY MEDICINE | Facility: CLINIC | Age: 80
End: 2020-04-29

## 2020-05-08 ENCOUNTER — COMMUNICATION - HEALTHEAST (OUTPATIENT)
Dept: FAMILY MEDICINE | Facility: CLINIC | Age: 80
End: 2020-05-08

## 2020-05-08 DIAGNOSIS — E11.65 UNCONTROLLED TYPE 2 DIABETES MELLITUS WITH HYPERGLYCEMIA (H): ICD-10-CM

## 2020-05-18 ENCOUNTER — COMMUNICATION - HEALTHEAST (OUTPATIENT)
Dept: FAMILY MEDICINE | Facility: CLINIC | Age: 80
End: 2020-05-18

## 2020-05-18 DIAGNOSIS — E11.9 DIABETES TYPE 2, CONTROLLED (H): ICD-10-CM

## 2020-05-27 ENCOUNTER — COMMUNICATION - HEALTHEAST (OUTPATIENT)
Dept: SCHEDULING | Facility: CLINIC | Age: 80
End: 2020-05-27

## 2020-05-27 DIAGNOSIS — E11.65 UNCONTROLLED TYPE 2 DIABETES MELLITUS WITH HYPERGLYCEMIA (H): ICD-10-CM

## 2020-06-08 ENCOUNTER — RECORDS - HEALTHEAST (OUTPATIENT)
Dept: ADMINISTRATIVE | Facility: OTHER | Age: 80
End: 2020-06-08

## 2020-06-08 ENCOUNTER — COMMUNICATION - HEALTHEAST (OUTPATIENT)
Dept: SCHEDULING | Facility: CLINIC | Age: 80
End: 2020-06-08

## 2020-06-30 ENCOUNTER — COMMUNICATION - HEALTHEAST (OUTPATIENT)
Dept: SCHEDULING | Facility: CLINIC | Age: 80
End: 2020-06-30

## 2020-06-30 DIAGNOSIS — E11.9 DIABETES TYPE 2, CONTROLLED (H): ICD-10-CM

## 2020-07-01 RX ORDER — INSULIN GLARGINE 100 [IU]/ML
INJECTION, SOLUTION SUBCUTANEOUS
Qty: 120 ML | Refills: 2 | Status: SHIPPED | OUTPATIENT
Start: 2020-07-01 | End: 2021-12-20

## 2020-07-22 ENCOUNTER — RECORDS - HEALTHEAST (OUTPATIENT)
Dept: ADMINISTRATIVE | Facility: OTHER | Age: 80
End: 2020-07-22

## 2020-07-22 ENCOUNTER — COMMUNICATION - HEALTHEAST (OUTPATIENT)
Dept: FAMILY MEDICINE | Facility: CLINIC | Age: 80
End: 2020-07-22

## 2020-07-22 DIAGNOSIS — E11.65 UNCONTROLLED TYPE 2 DIABETES MELLITUS WITH HYPERGLYCEMIA (H): ICD-10-CM

## 2020-08-02 ENCOUNTER — COMMUNICATION - HEALTHEAST (OUTPATIENT)
Dept: FAMILY MEDICINE | Facility: CLINIC | Age: 80
End: 2020-08-02

## 2020-08-02 DIAGNOSIS — E78.00 HYPERCHOLESTEROLEMIA: ICD-10-CM

## 2020-09-29 ENCOUNTER — RECORDS - HEALTHEAST (OUTPATIENT)
Dept: ADMINISTRATIVE | Facility: OTHER | Age: 80
End: 2020-09-29

## 2020-11-13 ENCOUNTER — COMMUNICATION - HEALTHEAST (OUTPATIENT)
Dept: FAMILY MEDICINE | Facility: CLINIC | Age: 80
End: 2020-11-13

## 2020-11-13 DIAGNOSIS — Z12.31 ENCOUNTER FOR SCREENING MAMMOGRAM FOR BREAST CANCER: ICD-10-CM

## 2020-11-16 ENCOUNTER — OFFICE VISIT - HEALTHEAST (OUTPATIENT)
Dept: FAMILY MEDICINE | Facility: CLINIC | Age: 80
End: 2020-11-16

## 2020-11-16 DIAGNOSIS — Z79.4 CONTROLLED TYPE 2 DIABETES MELLITUS WITH DIABETIC NEPHROPATHY, WITH LONG-TERM CURRENT USE OF INSULIN (H): ICD-10-CM

## 2020-11-16 DIAGNOSIS — N95.0 POST-MENOPAUSE BLEEDING: ICD-10-CM

## 2020-11-16 DIAGNOSIS — E11.21 CONTROLLED TYPE 2 DIABETES MELLITUS WITH DIABETIC NEPHROPATHY, WITH LONG-TERM CURRENT USE OF INSULIN (H): ICD-10-CM

## 2020-11-16 DIAGNOSIS — E66.01 MORBID OBESITY (H): ICD-10-CM

## 2020-12-07 ENCOUNTER — AMBULATORY - HEALTHEAST (OUTPATIENT)
Dept: LAB | Facility: CLINIC | Age: 80
End: 2020-12-07

## 2020-12-07 DIAGNOSIS — Z79.4 CONTROLLED TYPE 2 DIABETES MELLITUS WITH DIABETIC NEPHROPATHY, WITH LONG-TERM CURRENT USE OF INSULIN (H): ICD-10-CM

## 2020-12-07 DIAGNOSIS — E11.21 CONTROLLED TYPE 2 DIABETES MELLITUS WITH DIABETIC NEPHROPATHY, WITH LONG-TERM CURRENT USE OF INSULIN (H): ICD-10-CM

## 2020-12-07 DIAGNOSIS — E11.65 UNCONTROLLED TYPE 2 DIABETES MELLITUS WITH HYPERGLYCEMIA (H): ICD-10-CM

## 2020-12-07 LAB
ALBUMIN SERPL-MCNC: 4.2 G/DL (ref 3.5–5)
ALP SERPL-CCNC: 88 U/L (ref 45–120)
ALT SERPL W P-5'-P-CCNC: 22 U/L (ref 0–45)
ANION GAP SERPL CALCULATED.3IONS-SCNC: 12 MMOL/L (ref 5–18)
AST SERPL W P-5'-P-CCNC: 21 U/L (ref 0–40)
BILIRUB SERPL-MCNC: 0.5 MG/DL (ref 0–1)
BUN SERPL-MCNC: 21 MG/DL (ref 8–28)
CALCIUM SERPL-MCNC: 9.3 MG/DL (ref 8.5–10.5)
CHLORIDE BLD-SCNC: 101 MMOL/L (ref 98–107)
CHOLEST SERPL-MCNC: 136 MG/DL
CO2 SERPL-SCNC: 28 MMOL/L (ref 22–31)
CREAT SERPL-MCNC: 1.07 MG/DL (ref 0.6–1.1)
FASTING STATUS PATIENT QL REPORTED: ABNORMAL
GFR SERPL CREATININE-BSD FRML MDRD: 50 ML/MIN/1.73M2
GLUCOSE BLD-MCNC: 261 MG/DL (ref 70–125)
HBA1C MFR BLD: 7.2 %
HDLC SERPL-MCNC: 39 MG/DL
LDLC SERPL CALC-MCNC: 54 MG/DL
MAGNESIUM SERPL-MCNC: 1.7 MG/DL (ref 1.8–2.6)
POTASSIUM BLD-SCNC: 4.4 MMOL/L (ref 3.5–5)
PROT SERPL-MCNC: 7.3 G/DL (ref 6–8)
SODIUM SERPL-SCNC: 141 MMOL/L (ref 136–145)
TRIGL SERPL-MCNC: 215 MG/DL

## 2020-12-09 ENCOUNTER — COMMUNICATION - HEALTHEAST (OUTPATIENT)
Dept: FAMILY MEDICINE | Facility: CLINIC | Age: 80
End: 2020-12-09

## 2020-12-11 ENCOUNTER — OFFICE VISIT - HEALTHEAST (OUTPATIENT)
Dept: FAMILY MEDICINE | Facility: CLINIC | Age: 80
End: 2020-12-11

## 2020-12-11 DIAGNOSIS — J34.89 NASAL CONGESTION WITH RHINORRHEA: ICD-10-CM

## 2020-12-11 DIAGNOSIS — Z00.00 ENCOUNTER FOR MEDICAL EXAMINATION TO ESTABLISH CARE: ICD-10-CM

## 2020-12-11 DIAGNOSIS — N18.31 STAGE 3A CHRONIC KIDNEY DISEASE (H): ICD-10-CM

## 2020-12-11 DIAGNOSIS — N93.9 VAGINAL SPOTTING: ICD-10-CM

## 2020-12-11 DIAGNOSIS — R09.81 NASAL CONGESTION WITH RHINORRHEA: ICD-10-CM

## 2020-12-11 DIAGNOSIS — K57.32 DIVERTICULITIS OF COLON: ICD-10-CM

## 2020-12-11 DIAGNOSIS — R43.0 LOSS, SENSE OF, SMELL: ICD-10-CM

## 2020-12-11 DIAGNOSIS — E11.65 UNCONTROLLED TYPE 2 DIABETES MELLITUS WITH HYPERGLYCEMIA (H): ICD-10-CM

## 2020-12-11 ASSESSMENT — MIFFLIN-ST. JEOR: SCORE: 1473.35

## 2021-01-04 ENCOUNTER — OFFICE VISIT - HEALTHEAST (OUTPATIENT)
Dept: FAMILY MEDICINE | Facility: CLINIC | Age: 81
End: 2021-01-04

## 2021-01-04 ENCOUNTER — COMMUNICATION - HEALTHEAST (OUTPATIENT)
Dept: INTERNAL MEDICINE | Facility: CLINIC | Age: 81
End: 2021-01-04

## 2021-01-04 ENCOUNTER — AMBULATORY - HEALTHEAST (OUTPATIENT)
Dept: EDUCATION SERVICES | Facility: CLINIC | Age: 81
End: 2021-01-04

## 2021-01-04 DIAGNOSIS — E56.9 VITAMIN DEFICIENCY: ICD-10-CM

## 2021-01-04 DIAGNOSIS — Z78.0 POSTMENOPAUSAL: ICD-10-CM

## 2021-01-04 DIAGNOSIS — E55.9 VITAMIN D DEFICIENCY: ICD-10-CM

## 2021-01-04 DIAGNOSIS — H35.30 MACULAR DEGENERATION (SENILE) OF RETINA: ICD-10-CM

## 2021-01-04 DIAGNOSIS — Z79.4 TYPE 2 DIABETES MELLITUS WITH HYPERGLYCEMIA, WITH LONG-TERM CURRENT USE OF INSULIN (H): ICD-10-CM

## 2021-01-04 DIAGNOSIS — E11.65 TYPE 2 DIABETES MELLITUS WITH HYPERGLYCEMIA, WITH LONG-TERM CURRENT USE OF INSULIN (H): ICD-10-CM

## 2021-01-04 DIAGNOSIS — Z00.00 ROUTINE GENERAL MEDICAL EXAMINATION AT A HEALTH CARE FACILITY: ICD-10-CM

## 2021-01-04 DIAGNOSIS — N95.0 POSTMENOPAUSAL BLEEDING: ICD-10-CM

## 2021-01-04 RX ORDER — FLASH GLUCOSE SCANNING READER
1 EACH MISCELLANEOUS DAILY
Qty: 1 EACH | Refills: 0 | Status: SHIPPED | OUTPATIENT
Start: 2021-01-04

## 2021-01-04 RX ORDER — MAGNESIUM OXIDE 400 MG/1
400 TABLET ORAL DAILY
Qty: 200 TABLET | Refills: 1 | Status: SHIPPED | OUTPATIENT
Start: 2021-01-04 | End: 2021-08-09

## 2021-01-04 ASSESSMENT — MIFFLIN-ST. JEOR: SCORE: 1457.79

## 2021-01-05 ENCOUNTER — TELEPHONE (OUTPATIENT)
Dept: OBGYN | Facility: CLINIC | Age: 81
End: 2021-01-05

## 2021-01-05 LAB
25(OH)D3 SERPL-MCNC: 25 NG/ML (ref 30–80)
25(OH)D3 SERPL-MCNC: 25 NG/ML (ref 30–80)
HCV AB SERPL QL IA: NEGATIVE

## 2021-01-05 NOTE — TELEPHONE ENCOUNTER
Reason for call:  Other   Patient called regarding (reason for call): call back  Additional comments: Patient is calling because she is in need of a colposcopy. Please call patient back to schedule.    Phone number to reach patient:  Home number on file [phone] (home)    Best Time:  As soon as possible    Can we leave a detailed message on this number?  YES    Travel screening: Not Applicable

## 2021-01-06 ENCOUNTER — RECORDS - HEALTHEAST (OUTPATIENT)
Dept: ADMINISTRATIVE | Facility: OTHER | Age: 81
End: 2021-01-06

## 2021-01-07 NOTE — TELEPHONE ENCOUNTER
Called patient and after getting more details, we figured out that patient had called wrong clinic and was trying to schedule and appointment with her PCP Dr. Faye.     Patient will follow up with Dr. Faye when she sees her on Tuesday 01/12/21 in her appointment.

## 2021-01-08 ENCOUNTER — COMMUNICATION - HEALTHEAST (OUTPATIENT)
Dept: OTHER | Facility: CLINIC | Age: 81
End: 2021-01-08

## 2021-01-11 ENCOUNTER — COMMUNICATION - HEALTHEAST (OUTPATIENT)
Dept: ADMINISTRATIVE | Facility: CLINIC | Age: 81
End: 2021-01-11

## 2021-01-11 ENCOUNTER — COMMUNICATION - HEALTHEAST (OUTPATIENT)
Dept: FAMILY MEDICINE | Facility: CLINIC | Age: 81
End: 2021-01-11

## 2021-01-11 DIAGNOSIS — E11.9 TYPE 2 DIABETES MELLITUS (H): ICD-10-CM

## 2021-01-11 DIAGNOSIS — E11.65 UNCONTROLLED TYPE 2 DIABETES MELLITUS WITH HYPERGLYCEMIA (H): ICD-10-CM

## 2021-01-12 ENCOUNTER — AMBULATORY - HEALTHEAST (OUTPATIENT)
Dept: OBGYN | Facility: CLINIC | Age: 81
End: 2021-01-12

## 2021-01-12 DIAGNOSIS — E66.811 CLASS 1 OBESITY WITH BODY MASS INDEX (BMI) OF 33.0 TO 33.9 IN ADULT, UNSPECIFIED OBESITY TYPE, UNSPECIFIED WHETHER SERIOUS COMORBIDITY PRESENT: ICD-10-CM

## 2021-01-12 DIAGNOSIS — N95.0 PMB (POSTMENOPAUSAL BLEEDING): ICD-10-CM

## 2021-01-12 ASSESSMENT — MIFFLIN-ST. JEOR: SCORE: 1471.48

## 2021-01-13 ENCOUNTER — RECORDS - HEALTHEAST (OUTPATIENT)
Dept: ADMINISTRATIVE | Facility: OTHER | Age: 81
End: 2021-01-13

## 2021-01-13 LAB
LAB AP CHARGES (HE HISTORICAL CONVERSION): NORMAL
PATH REPORT.COMMENTS IMP SPEC: NORMAL
PATH REPORT.FINAL DX SPEC: NORMAL
PATH REPORT.GROSS SPEC: NORMAL
PATH REPORT.MICROSCOPIC SPEC OTHER STN: NORMAL
PATH REPORT.RELEVANT HX SPEC: NORMAL
RESULT FLAG (HE HISTORICAL CONVERSION): NORMAL

## 2021-01-21 ENCOUNTER — COMMUNICATION - HEALTHEAST (OUTPATIENT)
Dept: FAMILY MEDICINE | Facility: CLINIC | Age: 81
End: 2021-01-21

## 2021-01-21 DIAGNOSIS — I10 ESSENTIAL HYPERTENSION: ICD-10-CM

## 2021-01-22 RX ORDER — LISINOPRIL/HYDROCHLOROTHIAZIDE 10-12.5 MG
TABLET ORAL
Qty: 90 TABLET | Refills: 3 | Status: SHIPPED | OUTPATIENT
Start: 2021-01-22 | End: 2022-02-21

## 2021-01-25 ENCOUNTER — COMMUNICATION - HEALTHEAST (OUTPATIENT)
Dept: ADMINISTRATIVE | Facility: CLINIC | Age: 81
End: 2021-01-25

## 2021-01-25 DIAGNOSIS — E11.65 UNCONTROLLED TYPE 2 DIABETES MELLITUS WITH HYPERGLYCEMIA (H): ICD-10-CM

## 2021-02-04 ENCOUNTER — AMBULATORY - HEALTHEAST (OUTPATIENT)
Dept: NURSING | Facility: CLINIC | Age: 81
End: 2021-02-04

## 2021-02-05 ENCOUNTER — COMMUNICATION - HEALTHEAST (OUTPATIENT)
Dept: FAMILY MEDICINE | Facility: CLINIC | Age: 81
End: 2021-02-05

## 2021-02-05 DIAGNOSIS — Z79.4 TYPE 2 DIABETES MELLITUS WITH HYPERGLYCEMIA, WITH LONG-TERM CURRENT USE OF INSULIN (H): ICD-10-CM

## 2021-02-05 DIAGNOSIS — E11.65 TYPE 2 DIABETES MELLITUS WITH HYPERGLYCEMIA, WITH LONG-TERM CURRENT USE OF INSULIN (H): ICD-10-CM

## 2021-02-05 RX ORDER — LIRAGLUTIDE 6 MG/ML
INJECTION SUBCUTANEOUS
Qty: 6 ML | Refills: 5 | Status: SHIPPED | OUTPATIENT
Start: 2021-02-05 | End: 2021-08-09

## 2021-02-25 ENCOUNTER — AMBULATORY - HEALTHEAST (OUTPATIENT)
Dept: NURSING | Facility: CLINIC | Age: 81
End: 2021-02-25

## 2021-03-26 ENCOUNTER — RECORDS - HEALTHEAST (OUTPATIENT)
Dept: MAMMOGRAPHY | Facility: CLINIC | Age: 81
End: 2021-03-26

## 2021-03-26 DIAGNOSIS — Z12.31 ENCOUNTER FOR SCREENING MAMMOGRAM FOR MALIGNANT NEOPLASM OF BREAST: ICD-10-CM

## 2021-04-26 ENCOUNTER — OFFICE VISIT - HEALTHEAST (OUTPATIENT)
Dept: EDUCATION SERVICES | Facility: CLINIC | Age: 81
End: 2021-04-26

## 2021-04-28 ENCOUNTER — COMMUNICATION - HEALTHEAST (OUTPATIENT)
Dept: FAMILY MEDICINE | Facility: CLINIC | Age: 81
End: 2021-04-28

## 2021-04-28 DIAGNOSIS — E78.00 HYPERCHOLESTEROLEMIA: ICD-10-CM

## 2021-04-29 RX ORDER — ATORVASTATIN CALCIUM 40 MG/1
TABLET, FILM COATED ORAL
Qty: 90 TABLET | Refills: 2 | Status: SHIPPED | OUTPATIENT
Start: 2021-04-29 | End: 2022-02-09

## 2021-05-03 ENCOUNTER — RECORDS - HEALTHEAST (OUTPATIENT)
Dept: ADMINISTRATIVE | Facility: OTHER | Age: 81
End: 2021-05-03

## 2021-05-03 ENCOUNTER — OFFICE VISIT - HEALTHEAST (OUTPATIENT)
Dept: EDUCATION SERVICES | Facility: CLINIC | Age: 81
End: 2021-05-03

## 2021-05-05 ENCOUNTER — COMMUNICATION - HEALTHEAST (OUTPATIENT)
Dept: INTERNAL MEDICINE | Facility: CLINIC | Age: 81
End: 2021-05-05

## 2021-05-24 ENCOUNTER — AMBULATORY - HEALTHEAST (OUTPATIENT)
Dept: EDUCATION SERVICES | Facility: CLINIC | Age: 81
End: 2021-05-24

## 2021-05-24 ENCOUNTER — OFFICE VISIT - HEALTHEAST (OUTPATIENT)
Dept: EDUCATION SERVICES | Facility: CLINIC | Age: 81
End: 2021-05-24

## 2021-05-24 ENCOUNTER — AMBULATORY - HEALTHEAST (OUTPATIENT)
Dept: LAB | Facility: CLINIC | Age: 81
End: 2021-05-24

## 2021-05-24 LAB — HBA1C MFR BLD: 7.6 %

## 2021-05-25 ENCOUNTER — AMBULATORY - HEALTHEAST (OUTPATIENT)
Dept: FAMILY MEDICINE | Facility: CLINIC | Age: 81
End: 2021-05-25

## 2021-05-25 DIAGNOSIS — E11.65 UNCONTROLLED TYPE 2 DIABETES MELLITUS WITH HYPERGLYCEMIA (H): ICD-10-CM

## 2021-05-27 VITALS — WEIGHT: 217.6 LBS

## 2021-05-27 NOTE — PROGRESS NOTES
"SUBJECTIVE: Kelley Styles is a 78 y.o. female with:  Chief Complaint   Patient presents with     Diabetes check    She is enrolled in the [employer]  diabetes mellitus Look Ahead Study.  Her blood sugars have been elevated.  She continues on Lantus/ Novalog but was running low on the medicine so not taking as much as she should be taking.  No dietary changes. She is also taking asa/ Lipitor/ lisinopril-HCTZ. Blood pressure has been well controlled.    OBJECTIVE: /60 (Patient Site: Left Arm, Patient Position: Sitting, Cuff Size: Adult Large)   Pulse 64   Ht 5' 6\" (1.676 m)   Wt 217 lb 12.8 oz (98.8 kg)   BMI 35.15 kg/m   no distress      Recent Results (from the past 240 hour(s))   Glycosylated Hemoglobin A1c   Result Value Ref Range    Hemoglobin A1c 8.0 (H) 3.5 - 6.0 %     Kelley was seen today for diabetes check.    Diagnoses and all orders for this visit:    Diabetes type 2, controlled (H) - Will make sure she has adequate supply of insulin  Recheck in 3 months.  -     Glycosylated Hemoglobin A1c  -     Microalbumin, Random Urine  -     Glucose monitor  -     LANTUS U-100 INSULIN 100 unit/mL injection; INJECT 78 UNITS SUBCUTANEOUSLY AT BEDTIME      Encounter for screening mammogram for breast cancer  -     Mammo Screening Bilateral; Future     Reyna Hardin   "

## 2021-05-28 ENCOUNTER — RECORDS - HEALTHEAST (OUTPATIENT)
Dept: ADMINISTRATIVE | Facility: CLINIC | Age: 81
End: 2021-05-28

## 2021-05-29 NOTE — TELEPHONE ENCOUNTER
Refill Approved    Rx renewed per Medication Renewal Policy. Medication was last renewed on 7/9/18.    Bree Walton, [facility] Triage/Med Refill 5/29/2019     Requested Prescriptions   Pending Prescriptions Disp Refills     NOVOLOG U-100 INSULIN ASPART 100 unit/mL injection 180 mL 3     Sig: USE AS DIRECTED UP  UNITS A DAY AS NEEDED       Insulin/GLP-1 Refill Protocol Passed - 5/29/2019  9:05 AM        Passed - Visit with PCP or prescribing provider visit in last 6 months     Last office visit with prescriber/PCP: 4/5/2019 OR same dept: 4/5/2019 Reyna Hardin MD OR same specialty: 4/5/2019 Reyna Hardin MD Last physical: Visit date not found Last MTM visit: Visit date not found     Next appt within 3 mo: Visit date not found  Next physical within 3 mo: Visit date not found  Prescriber OR PCP: Reyna Hardin MD  Last diagnosis associated with med order: 1. Type II diabetes mellitus, uncontrolled (H)  - NOVOLOG U-100 INSULIN ASPART 100 unit/mL injection; USE AS DIRECTED UP  UNITS A DAY AS NEEDED  Dispense: 180 mL; Refill: 3    If protocol passes may refill for 6 months if within 3 months of last provider visit (or a total of 9 months).              Passed - A1C in last 6 months     Hemoglobin A1c   Date Value Ref Range Status   04/05/2019 8.0 (H) 3.5 - 6.0 % Final               Passed - Microalbumin in last year     Microalbumin, Random Urine   Date Value Ref Range Status   04/05/2019 2.43 (H) 0.00 - 1.99 mg/dL Final                  Passed - Blood pressure in last year     BP Readings from Last 1 Encounters:   04/05/19 120/60             Passed - Creatinine done in last year     Creatinine   Date Value Ref Range Status   10/10/2018 0.84 0.60 - 1.10 mg/dL Final

## 2021-05-29 NOTE — TELEPHONE ENCOUNTER
Refill Request  Did you contact pharmacy: Yes  Medication name:   Requested Prescriptions     Pending Prescriptions Disp Refills     NOVOLOG U-100 INSULIN ASPART 100 unit/mL injection 180 mL 3     Sig: USE AS DIRECTED UP  UNITS A DAY AS NEEDED     Who prescribed the medication: Dr Hardin  Pharmacy Name and Location: Change pharmacy to [facility] and fill for 90 day which patient stated is 7 vials a month.  I  Okay to leave a detailed message: yes

## 2021-05-30 ENCOUNTER — RECORDS - HEALTHEAST (OUTPATIENT)
Dept: ADMINISTRATIVE | Facility: CLINIC | Age: 81
End: 2021-05-30

## 2021-05-30 VITALS — WEIGHT: 202 LBS | BODY MASS INDEX: 31.71 KG/M2 | HEIGHT: 67 IN

## 2021-05-30 NOTE — PROGRESS NOTES
Assessment & Plan  1. 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  Otherwise I think it is likely a muscle strain.  I will refer her to physical therapy.  She can also take Aleve scheduled and alternate with Tylenol or use hot or ice packs.  If she continues to have pain in about 4 weeks despite this treatment but advised that she be seen here or with her orthopedic surgeon for evaluation.    - XR Hip Left 2 or More VWS; Future  - XR Femur Left 2 VWS; Future  - Ambulatory referral to Adult PT- Internal    2. Diabetes mellitus type 2 in nonobese (H)  Per her request a repeated these labs that she is due  - blood glucose test (ONETOUCH ULTRA TEST) strips; Use 1 each As Directed 4 (four) times a day. Dispense brand per patient's insurance at pharmacy discretion. (E11.9)  Dispense: 200 strip; Refill: 2  - Glycosylated Hemoglobin A1c    3. Essential hypertension  Pressure is borderline today.  However she says at home it has been higher.  I would advise that she continue to take her blood pressure at home.  If it continues to be less than 120 systolic I would discontinue some of her medications.  Would likely discontinue hydrochlorothiazide Zide as she has a history of diabetes and would continue the lisinopril.    - lisinopril-hydrochlorothiazide (PRINZIDE,ZESTORETIC) 10-12.5 mg per tablet; TAKE 1 TABLET BY MOUTH BEFORE BREAKFAST  Dispense: 90 tablet; Refill: 3      Maya Zamarripa MD    Subjective  Chief Complaint:  Concerns (weakness in left leg x 2 weeks) and Diabetes    HPI:   Kelley Styles is a 78 y.o. female who presents for left leg pain.  The pain is in the mid thigh on the lateral aspect.  She does not have pain in her joint of her hip or her knee.  The pain is been present for about 2 weeks.  It only hurts when she walks or stands.  She does not have any pain at rest.  She has only tried Aleve one day and had  "minimal relief.    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  She said it was due to a trauma-a door fell on her.  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  She said that the Ortho surgeon told her that the joint replacement apparatus is now off of the market due to concern about chipping of the metal.  She is not really sure what she is supposed to look for for a complication.  She has not seen Ortho surgeon in follow-up in several years.    She is still able to do her normal activities and it has not affected her daily life.  She does have times where she feels a little unstable but has not actually fallen due to the pain.    Allergies:  has No Known Allergies.    SH/FH:  Social History and Family History reviewed and updated.   Tobacco Status:  She  reports that she has never smoked. She has never used smokeless tobacco.    Review of Systems:    No weakness or numbness  No radiating back pain no fevers      Objective  Vitals:    07/12/19 1057   BP: 110/60   Patient Site: Right Arm   Patient Position: Sitting   Cuff Size: Adult Regular   Pulse: 60   Weight: 216 lb (98 kg)   Height: 5' 6.5\" (1.689 m)       Physical Exam:  GENERAL: Alert, well-appearing, in no acute distress.   SKIN: No apparent lesions, no bruising  MSK: No deformity.  Pain on palpation of the mid femur on the left in the mid shaft on the lateral anterior aspect.  It is somewhat localized to the middle 4 inches of her femur.  She does not have pain at the joint of the knee or the hip.  Her range of motion of the knee is intact.  The range of motion of her hip is relatively intact.  She does not have problems with her range of motion of external and internal rotation of the hip.      X-ray  Per my interpretation of the x-ray.  The femur does have a eusebio in place without any pins.  I do not see any acute complication or fracture.      "

## 2021-05-30 NOTE — PROGRESS NOTES
"[facility] Daily Progress     Patient Name: Kelley Styles  Date: 2019  Visit #:  through 10/14  PTA visit #:    PRECAUTIONS: DM II, osteopenia  Referral Diagnosis: Pain of left femur  Referring provider: Maya Zamarripa MD  Visit Diagnosis:     ICD-10-CM    1. Acute pain of left thigh M79.652    2. Antalgic gait R26.89    3. Generalized muscle weakness M62.81          Assessment:     Pt presents to PT with L lateral thigh pain, most consistent with ITB limited mobility, with differential diagnosis for lumbar-spine related dysfunction.  HEP/POC compliance is  good .  Patient is appropriate to continue with skilled physical therapy intervention, as indicated by initial plan of care.   Thus far, patient reports decreased intensity and frequency of pain with HEP, STM, and use of KTape.    Goal Status:  Pt. will be independent with home exercise program in : 6 weeks  Pt. will be able to walk : for community mobility;for household mobility;with no pain;in 6 weeks;Comment  Comment:: with normalized gait pattern  Patient will ascend / descend: stairs;with railing;with recipricol gait;with no pain;in 6 weeks    Patient will increase : LEFS score;for improved quality of life;in 6 weeks;by _ points  by ___ points: >= 9      Plan / Patient Education:     Next visit: Trial full rose mary test stretch.  Review bridges and clamshells, progressing to wall squat as able.  Continue MT and reapply KTape to L quad/IT band as needed.    Subjective:     Pain Ratin/10 at rest, \"uncomfortable\" with ambulation, but not always.  Does feel pain to be getting better. \"Feels like I am headed in the right direction.\"  Doing HEP regularly, 3x/day. Denies pain with ex. Feels ready for some strengthening ex.  KTape still holding well wtihout skin irritation.  Has not used the cane at all.    Objective:     On review of HEP, patient requires mild/mod cueing for correct performance.  Added bridges and clamshells to HEP " to progress LE strength.  Gait is mild/mod antalgic with increased bilat hip and knee flexion present during stance phase.     Treatment Today     TREATMENT MINUTES COMMENTS   Evaluation     Self-care/ Home management     Manual therapy 4 R SL STM to L IT band and quad   Neuromuscular Re-education     Therapeutic Activity     Therapeutic Exercises 33 Ex per flow sheet   Gait training 4 Reviewed proper gait pattern/sequencing with SEC in R hand   Modality__________________                Total 41    Blank areas are intentional and mean the treatment did not include these items.       Luís Young, PT, DPT  7/19/2019

## 2021-05-30 NOTE — TELEPHONE ENCOUNTER
Medication Question or Clarification  Who is calling: Patient  What medication are you calling about? (include dose and sig)   atorvastatin (LIPITOR) 40 MG tablet 90 tablet 2 3/21/2019     Sig: TAKE 1 TABLET BY MOUTH AT BEDTIME    Sent to pharmacy as: atorvastatin (LIPITOR) 40 MG tablet        Who prescribed the medication?: Reyna Hardin MD  What is your question/concern?:   Patient is requesting Provider fax new prescription of above medication, 90 tablet, 3 refills to [facility].  Pharmacy: [facility], [address], [place]  Fax# [phone]  Okay to leave a detailed message?: Yes  Site CMT - Please call the pharmacy to obtain any additional needed information.

## 2021-05-30 NOTE — PATIENT INSTRUCTIONS - HE
1. Referral to physical therapy  2. Try scheduling Aleve every 12hours (1 or 2 tabs), can also take Acetaminophen  3. Use ice and heat to the area  4. Can try topical cream like Bengay or Hot and Cold   5. If you continue to have pain after 4 weeks, return and we may refer you to orthopedic surgery

## 2021-05-30 NOTE — PROGRESS NOTES
"[facility] Daily Progress     Patient Name: Kelley Styles  Date: 2019  Visit #: 3/12 through 10/14  PTA visit #:    PRECAUTIONS: DM II, osteopenia  Referral Diagnosis: Pain of left femur  Referring provider: Maya Zamarripa MD  Visit Diagnosis:     ICD-10-CM    1. Acute pain of left thigh M79.652    2. Antalgic gait R26.89    3. Generalized muscle weakness M62.81          Assessment:     Pt presents to PT with L lateral thigh pain, most consistent with ITB limited mobility, with differential diagnosis for lumbar-spine related dysfunction.  HEP/POC compliance is  good .  Patient is appropriate to continue with skilled physical therapy intervention, as indicated by initial plan of care.   Thus far, patient reports decreased intensity and frequency of pain with HEP, STM, and use of KTape.    Goal Status:  Pt. will be independent with home exercise program in : 6 weeks  Pt. will be able to walk : for community mobility;for household mobility;with no pain;in 6 weeks;Comment  Comment:: with normalized gait pattern  Patient will ascend / descend: stairs;with railing;with recipricol gait;with no pain;in 6 weeks    Patient will increase : LEFS score;for improved quality of life;in 6 weeks;by _ points  by ___ points: >= 9      Plan / Patient Education:     Next visit: Review full Glenn test stretch, bridges and clamshells, progressing to wall squat as able.  Continue MT and reapply KTape to L quad/IT band as needed.    Subjective:     Pain Ratin/10 at rest, \"uncomfortable\" with ambulation, but not always.  Scheduled to see the orthopedic doctor for this Thursday, because since last visit, she has had two instances where the leg has given out, both when going down the stairs.  Usually able to reciprocally ascend stairs, but performs step-to for descending. Has not used the SEC at all.  Has not done any of the strengthening exercises, but still doing the stretches.    Objective:     Patient " continues to require mild/mod cueing for correct performance.  Gait is mild/mod antalgic with increased bilat hip and knee flexion present during stance phase.   Limited soft tissue mobility present L ITB and quad; improved with MT.    Treatment Today     TREATMENT MINUTES COMMENTS   Evaluation     Self-care/ Home management     Manual therapy 5 R SL STM/IANSM to L IT band and quad   Neuromuscular Re-education     Therapeutic Activity     Therapeutic Exercises 25 Ex per flow sheet  KTape L ITB and lateral quad inhibition   Gait training     Modality__________________                Total 30    Blank areas are intentional and mean the treatment did not include these items.       Luís Young, PT, DPT  7/22/2019     [facility] Discharge Summary  Patient Name: Kelley Styles  Date: 8/19/2019  Referral Diagnosis: Pain of left femur  Referring provider: Maya Zamarripa MD  Visit Diagnosis:   1. Acute pain of left thigh     2. Antalgic gait     3. Generalized muscle weakness         Goals: PROGRESSING  Pt. will be independent with home exercise program in : 6 weeks  Pt. will be able to walk : for community mobility;for household mobility;with no pain;in 6 weeks;Comment  Comment:: with normalized gait pattern  Patient will ascend / descend: stairs;with railing;with recipricol gait;with no pain;in 6 weeks    Patient will increase : LEFS score;for improved quality of life;in 6 weeks;by _ points  by ___ points: >= 9      Patient was seen for 3 visits from 7/16 to 7/22 with 0 missed appointments.  The patient attended therapy initially, but did not finish the therapy sessions prescribed.  Goals were not fully achieved.    Therapy will be discontinued at this time.  The patient will need a new referral to resume.    Thank you for your referral.  Luís Young  8/19/2019  2:10 PM

## 2021-05-30 NOTE — PROGRESS NOTES
[facility] Certification Request    July 16, 2019      Patient: Kelley Styles  MR Number: [id number]  YOB: 1940  Date of Visit: 7/16/2019      Dear Dr. Maya Zamarripa:    Thank you for this referral.   We are seeing Kelley Styles in Physical Therapy for left thigh pain.    Medicare and/or Medicaid requires physician review and approval of the treatment plan. Please review the plan of care and verify that you agree with the therapy plan of care by co-signing this note.      Plan of Care  Authorization / Certification Start Date: 07/16/19  Authorization / Certification End Date: 10/14/19  Authorization / Certification Number of Visits: 12  Communication with: Referral Source  Patient Related Instruction: Nature of Condition;Treatment plan and rationale;Self Care instruction;Basis of treatment;Body mechanics;Precautions;Next steps;Expected outcome  Times per Week: 1-2  Number of Weeks: 6  Number of Visits: 12  Therapeutic Exercise: Stretching;Strengthening  Neuromuscular Reeducation: balance/proprioception;kinesio tape  Manual Therapy: soft tissue mobilization;myofascial release      Goals:  Pt. will be independent with home exercise program in : 6 weeks  Pt. will be able to walk : for community mobility;for household mobility;with no pain;in 6 weeks;Comment  Comment:: with normalized gait pattern  Patient will ascend / descend: stairs;with railing;with recipricol gait;with no pain;in 6 weeks    Patient will increase : LEFS score;for improved quality of life;in 6 weeks;by _ points  by ___ points: >= 9        If you have any questions or concerns, please don't hesitate to call.    Sincerely,      Luís Young, PT        Physician recommendation:     ___ Follow therapist's recommendation        ___ Modify therapy      *Physician co-signature indicates they certify the need for these services furnished within this plan and while under their care.    [facility]   Initial  Evaluation    Patient Name: Kelley Styles  Date of evaluation: 7/16/2019  PRECAUTIONS: DM II, osteopenia  Referral Diagnosis: Pain of left femur  Referring provider: Maya Zamarripa MD  Visit Diagnosis:     ICD-10-CM    1. Acute pain of left thigh M79.652    2. Antalgic gait R26.89    3. Generalized muscle weakness M62.81        Assessment:      Pt. is appropriate for skilled PT intervention as outlined in the Plan of Care (POC).  Pt. is a good candidate for skilled PT services to improve pain levels and function.  Goals and POC established in collaboration with the patient.  Pt presents to PT with L lateral thigh pain, most consistent with ITB limited mobility, with differential diagnosis for lumbar-spine related dysfunction.    Goals:  Pt. will be independent with home exercise program in : 6 weeks  Pt. will be able to walk : for community mobility;for household mobility;with no pain;in 6 weeks;Comment  Comment:: with normalized gait pattern  Patient will ascend / descend: stairs;with railing;with recipricol gait;with no pain;in 6 weeks    Patient will increase : LEFS score;for improved quality of life;in 6 weeks;by _ points  by ___ points: >= 9      Patient's expectations/goals are realistic.    Barriers to Learning or Achieving Goals:  No Barriers.       Plan / Patient Instructions:        Plan of Care:   Authorization / Certification Start Date: 07/16/19  Authorization / Certification End Date: 10/14/19  Authorization / Certification Number of Visits: 12  Communication with: Referral Source  Patient Related Instruction: Nature of Condition;Treatment plan and rationale;Self Care instruction;Basis of treatment;Body mechanics;Precautions;Next steps;Expected outcome  Times per Week: 1-2  Number of Weeks: 6  Number of Visits: 12  Therapeutic Exercise: Stretching;Strengthening  Neuromuscular Reeducation: balance/proprioception;kinesio tape  Manual Therapy: soft tissue mobilization;myofascial release      Plan  "for next visit: Review HEP and assess KTape effectiveness. Follow-up on use of SEC for ambulation. Add bridges and clamshells as tolerated.     Subjective:       History of Present Illness:    Kelley is a 78 y.o. female who presents to therapy today with complaints of left lateral thigh pain.   PER EMR: \"Kelley Styles is a 78 y.o. female who presents for left leg pain.  The pain is in the mid thigh on the lateral aspect.  She does not have pain in her joint of her hip or her knee.  The pain is been present for about 2 weeks.  It only hurts when she walks or stands.  She does not have any pain at rest.  She has only tried Aleve one day and had minimal relief. 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  She said it was due to a trauma-a door fell on her. She is still able to do her normal activities and it has not affected her daily life.  She does have times where she feels a little unstable but has not actually fallen due to the pain.\"  Onset: Abuot 2 weeks ago. Relatively sudden onset, but no known cause or injury.  Duration: Intermittent  Worse with walking (10 min max), bending, going up and down stairs  Better with sitting  Pain Medication: Tried Aleve for one day, but no noticeable difference  Sleep: Denies waking due to pain    Previous L JUAN R 11 years ago.    Pt seeks PT to \"muscle strength.\"    Pain Ratin  Pain rating at best: 0  Pain rating at worst: 5  Pain description: pain, sharp and soreness     Objective:      Patient Outcome Measures :    Lower Extremity Functional Scale (_/80): 28     Scores range from 0-80, where a score of 80 represents maximum function. The minimal clinically important difference is a positive change of 9 points.     PER EMR:  EXAM: XR HIP LEFT 2 OR MORE VWS  LOCATION: [facility]  DATE/TIME: 2019 11:47 AM     INDICATION: mid femur pain, had history of prior hip arthorplasty  COMPARISON: None.     FINDINGS: Left hip arthroplasty with no evidence of " hardware complication. No fracture or dislocation.    EXAM: XR FEMUR LEFT 2 VWS  LOCATION: [facility]  DATE/TIME: 7/12/2019 11:49 AM     INDICATION: Mid femur pain, thinks she may have a eusebio in the femur following a trauma in 2007  COMPARISON: None.     FINDINGS: No fracture or dislocation. Degenerative changes of the knee. Inferior aspect of the left hip arthroplasty is noted with no evidence of hardware complication.    Examination  1. Acute pain of left thigh     2. Antalgic gait     3. Generalized muscle weakness       Involved side: Left  Posture Observation:      General sitting posture is  fair.  General standing posture is poor.    Gait: Moderately antalgic with increased bilat hip and knee flexion present during stance phase. Significantly decreased gait speed and bilat step length noted.  Stairs: Step-to pattern required for ascending and descending.     Lumbar AROM with mod loss t/o but non-provocative.  SLR: 45* bilat.  Hip ROM  Date:      Hip ROM( ) AROM in degrees AROM in degrees AROM in degrees    Right Left Right Left Right Left   Hip Flexion (0-120 )         Hip Abduction (0-45 )         Hip External Rotation (0-50 )         Hip Internal Rotation (0-40 )         Hip Extension (0-15 )          PROM in degrees PROM in degrees PROM in degrees    Right Left Right Left Right Left   Hip Flexion (0-120 ) 115 115       Hip Abduction (0-45 ) 30 25       Hip External Rotation (0-50 ) 45 45       Hip Internal Rotation (0-40 ) 25 20       Hip Extension (0-15 ) -10 approx. -10 approx.         (+) David's bilat.  Limited soft tissue mobility present bilat IT band, with mild TTP on L.      Treatment Today     TREATMENT MINUTES COMMENTS   Evaluation 12    Self-care/ Home management     Manual therapy 10 -R SL STM and IANSM with Rockblades downregulation to decrease muscle spasm and pain   Neuromuscular Re-education     Therapeutic Activity     Therapeutic Exercises 23 -Discussed therapy diagnosis,  prognosis, POC, relevant anatomy and basis for tx.  -Reviewed and performed HEP with handouts provided  -KTape inhibition applied to L lateral quad and IT band   Gait training 10 Practiced gait with SEC in R UE, as patient does have at home to help offload weight from L LE and help to normalize gait pattern. Discussed proper height setting and use.  Reviewed proper step to pattern on stairs to limit pain.   Modality__________________                Total 55    Blank areas are intentional and mean the treatment did not include these items.            PT Evaluation Code: (Please list factors)  Patient History/Comorbidities: none  Examination: L lateral thigh pain  Clinical Presentation: Evolving  Clinical Decision Making: Low    Patient History/  Comorbidities Examination  (body structures and functions, activity limitations, and/or participation restrictions) Clinical Presentation Clinical Decision Making (Complexity)   No documented Comorbidities or personal factors 1-2 Elements Stable and/or uncomplicated Low   1-2 documented comorbidities or personal factor 3 Elements Evolving clinical presentation with changing characteristics Moderate   3-4 documented comorbidities or personal factors 4 or more Unstable and unpredictable High           Luís Young, PT, DPT  7/16/2019  2:39 PM

## 2021-05-30 NOTE — TELEPHONE ENCOUNTER
Medication: atorvastatin (LIPITOR) 40 MG tablet  Pharmacy: [facility]   Last OV: 04/05/19   Last Refill: 03/21/19  Q: 90 Refills: 2    Please advise on refill. Patient should still have refills on file at her [facility] pharmacy ([facility]) , but is asking for a new prescription of above medication, 90 tablet, 3 refills to [facility].    MARK/PRIYA

## 2021-05-31 VITALS — WEIGHT: 217 LBS | HEIGHT: 67 IN | BODY MASS INDEX: 34.06 KG/M2

## 2021-05-31 VITALS — WEIGHT: 214 LBS | BODY MASS INDEX: 33.59 KG/M2 | HEIGHT: 67 IN

## 2021-05-31 VITALS — HEIGHT: 67 IN | BODY MASS INDEX: 32.96 KG/M2 | WEIGHT: 210 LBS

## 2021-05-31 NOTE — TELEPHONE ENCOUNTER
Called LM 8/2/19 11:40 am [facility] to see if they have a different diagnosis code for the chromium ... Hip pain will not cover the lab. I have the blood in fridge just need new diagnosis code if possible to sign order.

## 2021-05-31 NOTE — TELEPHONE ENCOUNTER
Insulin-Treated Diabetes Mellitus Report has been completed and mailed to the address provided on the form   .    [address] [place] [address]  Phone: [phone]  Fax: [phone]    MARK/PRIYA

## 2021-05-31 NOTE — TELEPHONE ENCOUNTER
Who is calling:  Patient  Reason for Call:  Patient's labs done on 8/2/19 were to be sent to [facility], [facility] has not received.  Patient requesting that results be faxed to [phone], attn:[email or web address].  Date of last appointment with primary care: NA  Okay to leave a detailed message: Yes

## 2021-06-01 VITALS — WEIGHT: 205 LBS | HEIGHT: 66 IN | BODY MASS INDEX: 32.95 KG/M2

## 2021-06-01 VITALS — WEIGHT: 217 LBS | BODY MASS INDEX: 35.02 KG/M2

## 2021-06-01 VITALS — BODY MASS INDEX: 33.41 KG/M2 | WEIGHT: 207 LBS

## 2021-06-01 VITALS — WEIGHT: 215 LBS | BODY MASS INDEX: 34.55 KG/M2 | HEIGHT: 66 IN

## 2021-06-02 ENCOUNTER — RECORDS - HEALTHEAST (OUTPATIENT)
Dept: ADMINISTRATIVE | Facility: CLINIC | Age: 81
End: 2021-06-02

## 2021-06-02 VITALS — BODY MASS INDEX: 34.55 KG/M2 | WEIGHT: 215 LBS | HEIGHT: 66 IN

## 2021-06-02 VITALS — BODY MASS INDEX: 35 KG/M2 | HEIGHT: 66 IN | WEIGHT: 217.8 LBS

## 2021-06-02 VITALS — BODY MASS INDEX: 34.67 KG/M2 | WEIGHT: 215.75 LBS | HEIGHT: 66 IN

## 2021-06-02 NOTE — PROGRESS NOTES
"SUBJECTIVE: Kelley Styles is a 79 y.o. female with:  Chief Complaint   Patient presents with     Diabetes    1) diabetes mellitus type 2 - She has lost some weight by eating smaller portions.  She continues on insulin- Lantus/ Novolog.  No numbness/tingling in feet.  No shortness of breath or chest pain.  Sees podiatry to trim her nails.    2) She saw her dentist last week and he remarked that her left eye was yellow.  Has some puffiness around the eye and tenderness over her left scalp.  Hit her head on corner of cabinet last week.  No blisters / rash.  She also has some dental pain in left lower jaw and needs to have a crown removed.    3) Macular degeneration - Gets injections regularly.  Sees eye provider regularly.  No diabetic retinopathy.    4) Continues to have low back pain.    OBJECTIVE: /58 (Patient Site: Left Arm, Patient Position: Sitting, Cuff Size: Adult Large)   Pulse 83   Ht 5' 6.5\" (1.689 m)   Wt 209 lb (94.8 kg)   SpO2 97%   BMI 33.23 kg/m     No acute distress.  HEENT: Head is atraumatic and/normocephalic.  PERRL.  Conjunctiva clear.  Tympanic membranes grey with normal landmarks and normal light reflexes.  No nasal discharge.  Oropharynx is pink and moist.  Sinuses nontender.  Neck: Supple.  No lymphadenopathy or thyromegaly.  Lungs: Clear to auscultation.  No wheezing, retractions, or tachypnea.  Heart: RRR. S1 and S2 normal.  No murmurs, rubs, or gallops.  Neuro: Awake, alert, oriented x 3.  Normal strength and tone.  Normal gait.   Lower extremities: Normal color.  No lesions.  DP 2+ bilaterally.  Sensation intact to monofilament testing on plantar aspect of feet bilaterally.    Wt Readings from Last 3 Encounters:   10/14/19 209 lb (94.8 kg)   07/12/19 216 lb (98 kg)   04/05/19 217 lb 12.8 oz (98.8 kg)         Lab Results   Component Value Date    HGBA1C 7.3 (H) 10/14/2019     Kelley was seen today for diabetes.    Diagnoses and all orders for this visit:    Controlled type 2 " "diabetes mellitus without complication, with long-term current use of insulin (H)- control is improving.  F/U in 3 months.  -     Glycosylated Hemoglobin A1c  -     LDL Cholesterol, Direct  -     Comprehensive Metabolic Panel  -     insulin syringe-needle U-100 0.3 mL 31 gauge x 5/16\" Syrg; Use as directed for insulin    Chronic bilateral low back pain without sciatica     I recommend a trial of chair yoga at local [facility] program.    Reyna Hardin     "

## 2021-06-03 ENCOUNTER — RECORDS - HEALTHEAST (OUTPATIENT)
Dept: ADMINISTRATIVE | Facility: CLINIC | Age: 81
End: 2021-06-03

## 2021-06-03 VITALS
BODY MASS INDEX: 32.8 KG/M2 | OXYGEN SATURATION: 97 % | DIASTOLIC BLOOD PRESSURE: 58 MMHG | WEIGHT: 209 LBS | SYSTOLIC BLOOD PRESSURE: 104 MMHG | HEIGHT: 67 IN | HEART RATE: 83 BPM

## 2021-06-03 VITALS — BODY MASS INDEX: 33.9 KG/M2 | WEIGHT: 216 LBS | HEIGHT: 67 IN

## 2021-06-04 VITALS
TEMPERATURE: 97.6 F | HEART RATE: 78 BPM | DIASTOLIC BLOOD PRESSURE: 60 MMHG | OXYGEN SATURATION: 99 % | WEIGHT: 213.5 LBS | SYSTOLIC BLOOD PRESSURE: 130 MMHG | BODY MASS INDEX: 33.51 KG/M2 | HEIGHT: 67 IN

## 2021-06-04 VITALS
SYSTOLIC BLOOD PRESSURE: 112 MMHG | BODY MASS INDEX: 34.02 KG/M2 | HEART RATE: 84 BPM | WEIGHT: 214 LBS | DIASTOLIC BLOOD PRESSURE: 58 MMHG

## 2021-06-04 NOTE — TELEPHONE ENCOUNTER
Refill Request  Did you contact pharmacy: No  Medication name:   Requested Prescriptions     Pending Prescriptions Disp Refills     blood glucose test (ONETOUCH ULTRA TEST) strips 200 strip 2     Sig: Use 1 each As Directed 4 (four) times a day. Dispense brand per patient's insurance at pharmacy discretion. (E11.9)     Who prescribed the medication: Dr Hardin  Pharmacy Name and Location: [facility] [address]  Is patient out of medication: No.  2 days left  Patient notified refills processed in 72 hours:  no  Okay to leave a detailed message: yes [phone]

## 2021-06-04 NOTE — TELEPHONE ENCOUNTER
Refill Approved    Rx renewed per Medication Renewal Policy. Medication was last renewed on 7/12/2019 with 2 refills.  Last office visit: 10/14/2019 with PCP Dr SANTA Dunham, [facility] Triage/Med Refill 12/11/2019     Requested Prescriptions   Pending Prescriptions Disp Refills     blood glucose test (ONETOUCH ULTRA TEST) strips 200 strip 2     Sig: Use 1 each As Directed 4 (four) times a day. Dispense brand per patient's insurance at pharmacy discretion. (E11.9)       Diabetic Supplies Refill Protocol Passed - 12/11/2019  3:42 PM        Passed - Visit with PCP or prescribing provider visit in last 6 months     Last office visit with prescriber/PCP: 10/14/2019 Reyna Hardin MD OR same dept: 10/14/2019 Reyna Hardin MD OR same specialty: 10/14/2019 Reyna Hardin MD  Last physical: 10/3/2017 Last MTM visit: Visit date not found   Next visit within 3 mo: Visit date not found  Next physical within 3 mo: Visit date not found  Prescriber OR PCP: Reyna Hardin MD  Last diagnosis associated with med order: 1. Diabetes mellitus type 2 in nonobese (H)  - blood glucose test (ONETOUCH ULTRA TEST) strips; Use 1 each As Directed 4 (four) times a day. Dispense brand per patient's insurance at pharmacy discretion. (E11.9)  Dispense: 200 strip; Refill: 2    If protocol passes may refill for 12 months if within 3 months of last provider visit (or a total of 15 months).             Passed - A1C in last 6 months     Hemoglobin A1c   Date Value Ref Range Status   10/14/2019 7.3 (H) 3.5 - 6.0 % Final

## 2021-06-05 VITALS
OXYGEN SATURATION: 99 % | HEIGHT: 67 IN | WEIGHT: 213 LBS | HEART RATE: 78 BPM | BODY MASS INDEX: 33.43 KG/M2 | DIASTOLIC BLOOD PRESSURE: 67 MMHG | SYSTOLIC BLOOD PRESSURE: 128 MMHG

## 2021-06-05 VITALS
OXYGEN SATURATION: 97 % | DIASTOLIC BLOOD PRESSURE: 56 MMHG | HEIGHT: 67 IN | HEART RATE: 84 BPM | BODY MASS INDEX: 32.96 KG/M2 | SYSTOLIC BLOOD PRESSURE: 122 MMHG | WEIGHT: 210 LBS

## 2021-06-05 VITALS
BODY MASS INDEX: 33.24 KG/M2 | WEIGHT: 211.8 LBS | HEART RATE: 82 BPM | SYSTOLIC BLOOD PRESSURE: 132 MMHG | OXYGEN SATURATION: 98 % | HEIGHT: 67 IN | DIASTOLIC BLOOD PRESSURE: 58 MMHG

## 2021-06-05 VITALS — WEIGHT: 220.4 LBS | BODY MASS INDEX: 35 KG/M2

## 2021-06-06 NOTE — PROGRESS NOTES
SUBJECTIVE: Kelley Styles is a 79 y.o. female with:  Chief Complaint   Patient presents with     Diabetes    1) diabetes mellitus type 2 - She continues to work on lowering her A1C.  Blood sugar has been high in the am.  She continues on insulin.  No low blood sugars.  Tried using Dexcom meter but did not work for her.  She gets yearly eye checks.  Has macular degeneration so she is seen regularly for injections.      2) Arm/ hand problems  She saw hand surgeon this summer.  Was given pt a splint for left arm.  She has trouble closing her fingers on the right side.  She is also getting cramping in both thumbs when reading a newspaper. She has been getting numbness from her elbows down to hands in last month.      3) HTN - Continues on lisinopril / HCTZ. No side effects on the medication.    ROS: Some post-nasal drip.  No chest pain / shortness of breath.    OBJECTIVE: /58 (Patient Site: Right Arm, Patient Position: Sitting, Cuff Size: Adult Regular)   Pulse 84   Wt 214 lb (97.1 kg)   BMI 34.02 kg/m   no distress  Right hand- unable to fully flex her fingers and close fist.  Lungs: Clear to auscultation.  No retractions or tachypnea.  CV: RRR. S1 and S2 normal.  No murmurs, rubs or gallops.  Neuro: AAOx3.    Lab Results   Component Value Date    HGBA1C 7.4 (H) 02/10/2020     Kelley was seen today for diabetes.    Diagnoses and all orders for this visit:    Uncontrolled type 2 diabetes mellitus with hyperglycemia (H) - A1C about same as last time.  We discussed a trial of supplement to help with insulin sensitivity. Try and start exercise program.  Recheck 3 months.  -     Glycosylated Hemoglobin A1c  -     Basic Metabolic Panel  -     Magnesium    Essential hypertension- Well controlled.  -     lisinopril-hydrochlorothiazide (PRINZIDE,ZESTORETIC) 10-12.5 mg per tablet; TAKE 1 TABLET BY MOUTH BEFORE BREAKFAST    Contracture of hand joint, right- She is going to go back to the hand surgeon.    Patient  Instructions   [employer] is a company that carries high quality supplements.  In order to purchase the supplements you will need to follow these instructions:  1) Go to website: nutridyn.com  2) [place] and enter the following account number: [id number]  3) Search for the recommended supplements to make a purchase.    Gluco IR by [employer] - Start with 2 capsules daily and work up to 5 capsules daily.    Consider joining [employer] to do a group exercise program    Make a follow-up visit with hand surgeon at [facility]        Reyna Hardin
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
## 2021-06-06 NOTE — PATIENT INSTRUCTIONS - HE
[employer] is a company that carries high quality supplements.  In order to purchase the supplements you will need to follow these instructions:  1) Go to website: nutridyn.com  2) [place] and enter the following account number: [id number]  3) Search for the recommended supplements to make a purchase.    Gluco IR by [employer] - Start with 2 capsules daily and work up to 5 capsules daily.    Consider joining [employer] to do a group exercise program    Make a follow-up visit with hand surgeon at [facility]

## 2021-06-06 NOTE — PATIENT INSTRUCTIONS - HE
Take Buspar 5 mg twice a day and see if you feel improved.    If not improvement in 1-2 weeks let me know and we can increase dose.

## 2021-06-06 NOTE — PROGRESS NOTES
Community Health Worker called and left a message for the patient.  If the patient is returning my call, please transfer the patient to [facility] at ext. [phone].   Patient has been mailed a unreachable letter and was provided with CHW contact information if they are interested in accessing Clinic Care Coordination.  Order for Care Management has been closed, no further outreach will be done at this time and patient can be re-referred.       Clinic Care Coordination Contact  [facility]       Clinical Data: Care Coordinator Outreach  Outreach attempted x 1. Unable to leave [facility], no answer   Plan: Care Coordinator Discuss CCC, Offer enrollment  Care Coordinator will try to reach patient again in 1-2 business days.  Patient has f/u appt with PCP 3/11/2020
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
## 2021-06-07 NOTE — TELEPHONE ENCOUNTER
New Appointment Needed  What is the reason for the visit:  Diabetic check-up  Diabetic check up with labs  Provider Preference: PCP only  How soon do you need to be seen?: tomorrow  Waitlist offered?: No  Okay to leave a detailed message:  Yes

## 2021-06-08 NOTE — TELEPHONE ENCOUNTER
Patient Returning Call  Reason for call:  Patient called back.  Information relayed to patient:  n/a  Patient has additional questions:  Yes  If YES, what are your questions/concerns:  States that the pharmacy had received a new Rx for Lantus.   Patient states she mccormack not need the Lantus and needs this order cancelled otherwise she will be billed for it.  Also states she still needs the Novolog to be sent to the pharmacy today as she is almost out.  Please call patient when the Novolog has been sent and the Lantus cancelled.  Okay to leave a detailed message?: Yes

## 2021-06-08 NOTE — TELEPHONE ENCOUNTER
Patient needs Novolog insulin, [facility] to [facility] the Lantus that was filled on 5/20.    KLP, CMA

## 2021-06-08 NOTE — TELEPHONE ENCOUNTER
Medication Question or Clarification  Who is calling: Patient   What medication are you calling about (include dose and sig)?: NOVOLOG U-100 INSULIN ASPART 100 unit/mL injection  180 mL  3  8/14/2019     Sig: USE AS DIRECTED UP  UNITS A DAY AS NEEDED    Sent to pharmacy as: NOVOLOG U-100 INSULIN ASPART 100 unit/mL injection    Notes to Pharmacy: Please dispense 7 vials per month - 3 month supply with 3 refills    Who prescribed the medication?: Reyna Hardin MD  What is your question/concern?: Patient states her last prescription NOVOLOG was sent to [facility] pharmacy up on her request they don't have enough supply for 3 months they fill only 18 vials since than they are refilling 18 vials now patient is using mailing Pharmacy requesting to send new prescription with Please dispense 7 vials per month - 3 month supply with 3 refills . Please advise .   Requested Pharmacy: [facility] .  Okay to leave a detailed message?: No

## 2021-06-08 NOTE — PROGRESS NOTES
PREOPERATIVE HISTORY AND PHYSICAL EXAM   Cataracts  Diabetes Mellitus Type 2  Diverticulitis    SUBJCTIVE  Patient is being seen today for Preoperative Consultation at the request of Dr. Messina for the underlying condition of bilateral cataracts.  Surgery Date:  1/26/17 left cataract, 2/17 right cataract  Scheduled Surgery: cataract extraction    Exam Date:  1/17/2017   Examiner: Dr. Reyna Hardin MD ([facility])  Primary Provider: Reyna Hardin MD    HISTORY OF PRESENT ILLNESS:  Kelley Styles is a 76 y.o. female with bilateral cataracts.  She has had worsening cataracts over the last year.    Diverticulitis- recent episode.  See below.    diabetes mellitus type 2- Her control has been poor.  She has been on insulin.    Past Medical History   Diagnosis Date     Arthritis      Cataract      Diabetes mellitus      Diverticulitis 2004     GERD (gastroesophageal reflux disease)      Glaucoma      Hyperlipidemia      Hypertension      Low back pain      Low vitamin D level 8/24/2016     Lower leg edema      Macular degeneration      Metabolic syndrome      Morbid obesity      Osteopenia      Personal history of colonic polyps      adenomatous, removed 2015 colonoscopy       PAST SURGICAL HISTORY:     She  has a past surgical history that includes total hip arthroplasty; Appendectomy; Knee arthroscopy; Tonsillectomy; thyroid biopsy; Carpal tunnel release (Left); and Trigger finger release (Left)..  Anesthesia Reactions: None  Bleeding Disorders: None    CURRENT MEDICATIONS   Current Outpatient Prescriptions   Medication Sig Dispense Refill     aspirin 325 MG tablet Half of 325mg every other day        atorvastatin (LIPITOR) 40 MG tablet TAKE ONE TABLET BY MOUTH ONCE DAILY AT BEDTIME 90 tablet 3     blood glucose test (ONETOUCH ULTRA TEST) strips Use 1 each As Directed 4 (four) times a day. Dispense brand per patient's insurance at pharmacy discretion. (E11.9) 200 strip 2      cholecalciferol, vitamin D3, (VITAMIN D3) 2,000 unit cap Take 1 capsule by mouth 2 (two) times a day.       LANTUS 100 unit/mL injection Inject 60 Units under the skin bedtime. 50 mL 3     lisinopril-hydrochlorothiazide (PRINZIDE,ZESTORETIC) 10-12.5 mg per tablet TAKE ONE TABLET BY MOUTH ONCE DAILY 90 tablet 3     magnesium 250 mg Tab Take 1 tablet by mouth daily.       misoprostol (CYTOTEC) 200 MCG tablet Take 1 tablet (200 mcg total) by mouth 2 (two) times a day. 2 tablet 0     multivitamin with minerals (THERA-M) 9 mg iron-400 mcg Tab tablet Take 1 tablet by mouth daily.       NOVOLOG 100 unit/mL injection Use as directed up to 200 units a day, prn 70 mL 3     TRAVATAN Z 0.004 % ophthalmic drops Administer 1 drop to both eyes daily.       No current facility-administered medications for this visit.    .  ALLERGIES  No latex allergies.  No Known Allergies    HABITS & SOCIAL HISTORY:  She  reports that she has never smoked. She has never used smokeless tobacco.  She  reports that she does not drink alcohol.  Single.  Lives alone.  Retired.      FAMILY HISTORY:  Her family history includes Breast cancer in her paternal aunt; Dementia in her mother; Heart disease in her father, mother, paternal grandfather, and paternal grandmother; No Medical Problems in her daughter, maternal grandfather, and sister; Obesity in her paternal aunt; Other in her maternal grandmother; Stroke in her father and mother. There is no history of BRCA 1/2, Cancer, Colon cancer, Endometrial cancer, or Ovarian cancer.  Anesthesia Reactions: None  Bleeding Disorders: None    REVIEW OF SYSTEMS:  History of Present Illness  Recent Health  Fever: no  Chills: no  Fatigue: no  Chest Pain: no  Cough: no  Dyspnea: no  Urinary Frequency: no  Nausea: no  Vomiting: no  Diarrhea: no  Abdominal Pain: yes, left upper and left lower quad, better than 1 week ago  Easy Bruising: no  Lower Extremity Swelling: no  Poor Exercise Tolerance: no    One week ago she  "woke up with left sided pain.  Hurt to lie on her side.  She felt bloated and took gas pills.  She had a bowel movement.  She was seen in the ER and diagnosed with diverticulitis.  She had elevated WBC to 11.7 with left shift. She was given cipro/ flagyl.  She is better but still has abdominal pain.  She is only about 30% better after 8 days of the antibiotics.  She still feels bloated.  Normal bowel movements. She did have a case of diverticulitis 11 years ago. This is her 4th episode.    Diabetes mellitus - Her last A1C was 8.  Her blood sugars fluctuate a lot.  She has lost weight - 21 lbs since fall after attending meeting at bariatric clinic.  She is considering bariatric surgery.  She takes insulin for her diabetic control.    CARDIOVASCULAR: Negative for CAD, CHF, Valvular Disease, HTN, Atrial Fibrillation, Other Arrhythmia, ICD/Pacer, Peripheral Vascular Disease.  She had negative nuclear medicine stress test in 2013.  PULMONARY: Negative for COPD, Asthma, Apnea.  RENAL: Negative for Renal Failure, Dialysis.  She is on diuretic.  ENDOCRINE: Negative for Chronic steroid use, Thyroid Disease.  Diabetes Type 2 on insulin.  GI: Negative for Hiatal Hernia, Hepatic Disease.  She has GERD.  NEURO: Negative for CVA, TIA, Seizures, Neuropathy.  HEMATOLOGIC DISEASE: Negative for Clotting Disorder, Bleeding Disorder, Anemia.  MUSCULOSKELETAL: Negative for Muscular Dystrophy.  She has osteoarthritis.  MENTAL HEALTH: Negative for Anxiety, Depression, Dementia, ETOH/Drug History.  OTHER: Negative for increased risk for excessive blood loss, potential for refusing blood products, MRSA, VRE, HIV, AIDS, TB or pregnancy in women.    PHYSICAL EXAMINATION  Visit Vitals     /50 (Patient Site: Left Arm, Patient Position: Sitting, Cuff Size: Adult Regular)     Pulse 64     Temp 98.5  F (36.9  C) (Oral)     Resp 12     Ht 5' 6.93\" (1.7 m)     Wt 202 lb (91.6 kg)     Breastfeeding No     BMI 31.7 kg/m2      Wt Readings from " Last 3 Encounters:   01/17/17 202 lb (91.6 kg)   11/21/16 213 lb (96.6 kg)   10/19/16 (!) 223 lb (101.2 kg)       CONSTITUTIONAL - No acute distress.   SKIN - No visual abnormalities, including no rash, lesions or ulcers.  EYES - Normal conjunctiva and lids. Extraoccular movements intact.  Pupils equal, reactive to light and accomodation..  ENMT - Ears and nose externally unremarkable.  Normal external auditory canal and tympanic membranes.  Hearing grossly normal.  Nares unremarkable.  Unremarkable lips, gums and teeth.  Normal oropharynx, including mucosa, salivary glands, palate, tongue, or tonsils.  NECK - Symmetric, trachea midline, no masses, overall unremarkable.  No thyromegaly, tenderness or mass.  RESPIRATORY - Normal respiratory effort.  Normal percussion.  Clear to auscultation bilaterally.  CARDIAC - Regular rate and rhythm, normal S1 S2.  No murmurs rubs or gallops.  Normal and symmetric pedal pulses.  No edema or varicosities.  GI/ABDOMEN - Soft, Non-tender, non-distended, bowel sounds present.  No hepatosplenomegaly. No abdominal hernia.  MUSCULOSKELETAL - Normal gait and station.  Normal digits and nails.  Normal bones, joints and muscles including head and neck, spine and pelvis, and extremities.  NEUROLOGIC - Cranial Nerves II-XII intact.  Deep tendon reflex symmetric bilaterally.  PSYCHIATRIC - Mood and affect bright and congruent.  Good insight and judgement.  Oriented to time, place and person.  Good eye contact.  LYMPH/HEME - Normal    DATA:  Recent Results (from the past 240 hour(s))   Glycosylated Hemoglobin A1c   Result Value Ref Range    Hemoglobin A1c 6.8 (H) 3.5 - 6.0 %   Comprehensive Metabolic Panel   Result Value Ref Range    Sodium 141 136 - 145 mmol/L    Potassium 4.5 3.5 - 5.0 mmol/L    Chloride 107 98 - 107 mmol/L    CO2 25 22 - 31 mmol/L    Anion Gap, Calculation 9 5 - 18 mmol/L    Glucose 138 (H) 70 - 125 mg/dL    BUN 12 8 - 28 mg/dL    Creatinine 0.84 0.60 - 1.10 mg/dL    GFR  MDRD Af Amer >60 >60 mL/min/1.73m2    GFR MDRD Non Af Amer >60 >60 mL/min/1.73m2    Bilirubin, Total 0.4 0.0 - 1.0 mg/dL    Calcium 9.1 8.5 - 10.5 mg/dL    Protein, Total 6.6 6.0 - 8.0 g/dL    Albumin 3.6 3.5 - 5.0 g/dL    Alkaline Phosphatase 76 45 - 120 U/L    AST 24 0 - 40 U/L    ALT 23 0 - 45 U/L   HM1 (CBC with Diff)   Result Value Ref Range    WBC 6.3 4.0 - 11.0 thou/uL    RBC 4.35 3.80 - 5.40 mill/uL    Hemoglobin 13.4 12.0 - 16.0 g/dL    Hematocrit 40.0 35.0 - 47.0 %    MCV 92 80 - 100 fL    MCH 30.8 27.0 - 34.0 pg    MCHC 33.5 32.0 - 36.0 g/dL    RDW 12.3 11.0 - 14.5 %    Platelets 263 140 - 440 thou/uL    MPV 8.2 7.0 - 10.0 fL    Neutrophils % 59 50 - 70 %    Lymphocytes % 31 20 - 40 %    Monocytes % 7 2 - 10 %    Eosinophils % 2 0 - 6 %    Basophils % 1 0 - 2 %    Neutrophils Absolute 3.7 2.0 - 7.7 thou/uL    Lymphocytes Absolute 2.0 0.8 - 4.4 thou/uL    Monocytes Absolute 0.4 0.0 - 0.9 thou/uL    Eosinophils Absolute 0.2 0.0 - 0.4 thou/uL    Basophils Absolute 0.0 0.0 - 0.2 thou/uL      ASSESSMENT:     1) Bilateral cataracts    2) Recent episode of diverticulitis: Resolving    3) Diabetes Type 2: good control    PLAN:  Patient approved for surgery with general or local anesthesia.  Post-operative pain to be managed by Surgeon during post-operative timeframe, as defined by [employer] [facility]  Postoperative Care will be managed by Hospital Service, if admitted.  Stop Aspirin, NSAID's (Ibuprofen, Aleve), fish oil   Hold supplements 10 days before surgery.  Hold Novolog in am for surgery.  Continue Lantus at bedtime.  Above recommendations were reviewed with patient.    She should finish her antibiotics for diverticulitis.  If not continuing to improve, consider a CT of the abdomen.  I did also recommend a course of probiotics.    We discussed switching to Tresiba, long acting insulin to better regulate blood sugars.  Continue with weight loss.    Reyna Hardin MD, [facility].     [address] [place] [address]  PHONE: [phone], FAX: [phone]      Preoperative Cardiac Risk Stratificaiton and Management   Cardiac risk assessment - Low.   Beta-blocker protocal - Not indicated  NO active Cardiac Conditions including noUnstable/Severe Angina, Recent Myocardial Infarction (<3 mo), New, worsening or decompensated heart failure, Significant Arrhythmias, or Severe Valvular Disease]    Low Risk Surgery.  Functional Capacity > 4 METS.  No Beta Blockage/cardiac evaluation:  No Ischemic Heart Disease; Compensated or prior heart failure; Diabetes mellitus;  Chronic kidney disease; Cerebrovascular disease.     Final Disposition  Proceed with proposed surgery without additional clinical clarifications  No Cardiology consultation or non-invasive testing.   ___________________________________________    Reyna Hardin MD, [facility].    [address] [place] [address]  PHONE: [phone], FAX: [phone]

## 2021-06-08 NOTE — TELEPHONE ENCOUNTER
Left message to call back for: Patient  Information to relay to patient:  Please inform patient that Dr. Hardin is currently on furlough this week and will return next week. Is she okay with waiting until next week for her to refill for a 3 month supply if she has enough medication for now?    MARK/PRIYA

## 2021-06-08 NOTE — TELEPHONE ENCOUNTER
"Medication Question or Clarification  Who is calling: Patient  What medication are you calling about (include dose and sig)?:   insulin syringe-needle U-100 0.3 mL 31 gauge x 5/16\" Syrg  300 each  5  10/14/2019      Sig: Use as directed for insulin     Sent to pharmacy as: insulin syringe U-100 with needle 0.3 mL 31 gauge x 5/16\"     E-Prescribing Status: Receipt confirmed by pharmacy (10/14/2019  1:15 PM CDT)         Who prescribed the medication?: Reyna Hardin MD  What is your question/concern?: The patient had been receiving the Ultracomfort 1 cc 20gx 1/2\" syringes, as with her hands these are better controlled by her. However she was sent the U-100 .3ml 31 g  5/16'  Patient requests that the Ultra comfort be sent to the local pharmacy for her immediate  as she has 1 syringe left.  [facility]ed [facility]acy: [facility] to leave a detailed message?: Yes        "

## 2021-06-08 NOTE — TELEPHONE ENCOUNTER
Patient Returning Call  Reason for call:  Patient returning call to the clinic.  Information relayed to patient:  Yes as instructed and patient stated I am concerned about the time it takes to use the mail order and getting this on time. Can someone else fill this medication.   Patient has additional questions:  yes  If YES, what are your questions/concerns:  Please return my call to discuss this refill as I use to get the 21 vials and now only getting the 18. Can this be changed back?   Okay to leave a detailed message?: Yes

## 2021-06-08 NOTE — TELEPHONE ENCOUNTER
"Patient calling stating \"I am diabetic and using insulin injections.\" \"The entire 1/2 inch needle broke off in my abdomen.\"  Reporting needle broke off yesterday. Reporting needle imbedded on right side of abdomen 2 inches from naval. Patient stating she is unable to see it. Reporting it feels uncomfortable.      Per guidelines Emergency Room advised.   Patient will go into [facility] now.    Susan Griffith RN  [facility]    Please be aware that novel coronavirus (COVID-19) may be circulating in the community. If you develop symptoms such as fever, cough, or SOB or if you have concerns about the presence of another infection including coronavirus (COVID-19), please contact your health care provider or visit [email or web address].     Disposition/Instructions    Patient to go to ED and follow protocol based instructions. Follow System Ambulatory Workflow for COVID 19.     Bring Your Own Device:  Please also bring your smart device(s) (smart phones, tablets, laptops) and their charging cables for your personal use and to communicate with your care team during your visit.      Thank you for taking steps to prevent the spread of this virus.  o Limit your contact with others.  o Wear a simple mask to cover your cough.  o Wash your hands well and often.    Resources    [facility]: About COVID-19: [email or web address]    CDC: What to Do If You're Sick: www.cdc.gov/coronavirus/2019-ncov/about/steps-when-sick.html    CDC: Ending Home Isolation: www.cdc.gov/coronavirus/2019-ncov/hcp/disposition-in-home-patients.html     CDC: Caring for Someone: www.cdc.gov/coronavirus/2019-ncov/if-you-are-sick/care-for-someone.html     [facility]: Interim Guidance for Hospital Discharge to Home: www.health.[facility].[place]/diseases/coronavirus/hcp/hospdischarge.pdf    [facility] clinical trials (COVID-19 research studies): clinicalaffairs.[facility]/umn-clinical-trials     Below are the COVID-19 hotlines at the " [place] [facility] of [facility] ([facility]). Interpreters are available.   o For health questions: Call 019-265-9875 or 1-607.269.8332 (7 a.m. to 7 p.m.)  o For questions about schools and childcare: Call 609-239-9639 or 1-545.687.3457 (7 a.m. to 7 p.m.)     Additional Information    Negative: Sounds like a life-threatening emergency to the triager    Negative: Puncture wound (and no current foreign body)    Negative: SEVERE pain (e.g., excruciating)    Deeply embedded FB (e.g., needle or toothpick in foot)    Protocols used: SKIN FOREIGN BODY-A-OH

## 2021-06-08 NOTE — TELEPHONE ENCOUNTER
Informed patient that Dr. Hardin has placed Future labs orders.     Patient will be calling the [facility] to schedule her appointment. Have provided her with their address and phone number.    Address: [address], [place] [address]   Phone: [phone]        MARK/PRIYA

## 2021-06-08 NOTE — TELEPHONE ENCOUNTER
Who is calling:  Patient  Reason for Call:  Patient is looking to do an A1C labs please leave labs in chart, or contact patient to schedule  Date of last appointment with primary care: NA  Okay to leave a detailed message: Yes

## 2021-06-08 NOTE — TELEPHONE ENCOUNTER
Labs ordered for future.  Advise pt she can call [facility] and set up a lab only visit at clinic of her choice.

## 2021-06-08 NOTE — TELEPHONE ENCOUNTER
Spoke with patient and informed her rx was sent. Informed her 3 additional refills were given. [facility]. Patient understood. No further questions.    MARK/PRIYA

## 2021-06-09 NOTE — TELEPHONE ENCOUNTER
Medication Request  Medication name:   NOVOLOG U-100 INSULIN ASPART 100 unit/mL injection  180 mL  3  5/22/2020      Sig: USE AS DIRECTED UP  UNITS A DAY AS NEEDED     Sent to pharmacy as: NovoLOG U-100 Insulin aspart 100 unit/mL subcutaneous solution     Notes to Pharmacy: Please dispense 7 vials per month - 3 month supply with 3 refills     E-Prescribing Status: Receipt confirmed by pharmacy (5/22/2020  1:09 PM CDT)         [facility]ed [facility]acy: [facility]  Reason for request:   Patient is low on above medication and is requesting it be sent to a local Pharmacy.  Patient is requesting the the sig say 300 units so she will be able to get 7 vials per month.  3 months supply with 3 refills.  This is what the Pharmacy told her to tell the Provider.  Thank you.  When did you use medication last?:    Currently taking.  Patient offered appointment:  No  Okay to leave a detailed message: yes

## 2021-06-09 NOTE — TELEPHONE ENCOUNTER
Refill Approved    Rx renewed per Medication Renewal Policy. Medication was last renewed on 5/20/20.    Bree Walton, [facility] Triage/Med Refill 7/1/2020     Requested Prescriptions   Pending Prescriptions Disp Refills     LANTUS U-100 INSULIN 100 unit/mL vial [Pharmacy Med Name: INSULIN LANTUS VIAL 100U/ML] 120 mL 2     Sig: INJECT 78 UNITS UNDER THE SKIN AT BEDTIME       Insulin/GLP-1 Refill Protocol Passed - 6/30/2020  8:08 AM        Passed - Visit with PCP or prescribing provider visit in last 6 months     Last office visit with prescriber/PCP: 3/11/2020 OR same dept: Visit date not found OR same specialty: Visit date not found Last physical: Visit date not found Last MTM visit: 7/26/2017 Crystal Tiwari, PharmD     Next appt within 3 mo: Visit date not found  Next physical within 3 mo: Visit date not found  Prescriber OR PCP: Reyna Hardin MD  Last diagnosis associated with med order: 1. Diabetes type 2, controlled (H)  - LANTUS U-100 INSULIN 100 unit/mL vial [Pharmacy Med Name: INSULIN LANTUS VIAL 100U/ML]; INJECT 78 UNITS UNDER THE SKIN AT BEDTIME  Dispense: 120 mL; Refill: 2    If protocol passes may refill for 6 months if within 3 months of last provider visit (or a total of 9 months).              Passed - A1C in last 6 months     Hemoglobin A1c   Date Value Ref Range Status   02/10/2020 7.4 (H) 3.5 - 6.0 % Final               Passed - Microalbumin in last year     Microalbumin, Random Urine   Date Value Ref Range Status   03/11/2020 1.04 0.00 - 1.99 mg/dL Final                  Passed - Blood pressure in last year     BP Readings from Last 1 Encounters:   03/11/20 130/60             Passed - Creatinine done in last year     Creatinine   Date Value Ref Range Status   03/03/2020 0.89 0.60 - 1.10 mg/dL Final

## 2021-06-10 NOTE — PROGRESS NOTES
SUBJECTIVE: Kelley Styles is a 76 y.o. female who presents for education and lifestyle intervention to help manage chronic disease.  She has diabetes mellitus type 2 and is currently on insulin- Lantus and Novolog.  She struggles with weight loss.  She has been able to lose some weight but has plateaued.  She was considering bariatric surgery but is now reconsidering that plan.    Current health habits:  Diet: Standard American  Exercise: minimum  Stress management: none    SH: Lives alone.  Occupation retired.  Alcohol none.  Smoking none.    OBJECTIVE: /68  Pulse 64  SpO2 97%   No distress.  CV: RRR. S1 and S2 normal.  No murmurs / rubs / gallops.  Respiratory: Lungs CTA with no crackles or wheezing.  Neuro: AAOx3.  Normal strength and tone.  Normal gait.    Lab Results   Component Value Date    HGBA1C 6.8 (H) 01/17/2017     Wt Readings from Last 3 Encounters:   01/17/17 202 lb (91.6 kg)   11/21/16 213 lb (96.6 kg)   10/19/16 (!) 223 lb (101.2 kg)     Self efficacy scale - 6  Perceived stress scale - 19  Food Habits survey - grains 7 servings/ day, vegetables 1 serving / day, fruit 1 serving / day, sweets 1 serving / day    Kelley was seen today for dm group visit.    Diagnoses and all orders for this visit:    Diabetes type 2, controlled     Kelley participated in a 2 hours group session.  The group consisted of discussion about stress and the fight or flight response.  Negative manifestations of stress were discussed including increased cortisol levels which can lead to hyperglycemia.  Mind-body techniques were introduced as a way to respond to stress rather than simply react to stressors.  She was taught diaphragmatic breathing and the benefits of breathing in this manner were highlighted.      There was also a discussion about nutrition and a low glycemic index diet was discussed.  A plant based diet rich in phytochemicals was also discussed.  Mindfulness was introduced and Kelley participated in a  mindful eating exercise.    She was encouraged to practice mindful eating and diaphragmatic breathing over the next week.    Follow-up next week for session 2.    Reyna Hardin

## 2021-06-10 NOTE — TELEPHONE ENCOUNTER
Refill Approved    Rx renewed per Medication Renewal Policy. Medication was last renewed on 7/1/19.    Bree Walton, [facility] Triage/Med Refill 8/3/2020     Requested Prescriptions   Pending Prescriptions Disp Refills     atorvastatin (LIPITOR) 40 MG tablet [Pharmacy Med Name: ATORVASTATIN TABS 40MG] 90 tablet 3     Sig: TAKE 1 TABLET AT BEDTIME       Statins Refill Protocol (Hmg CoA Reductase Inhibitors) Passed - 8/2/2020 11:10 AM        Passed - PCP or prescribing provider visit in past 12 months      Last office visit with prescriber/PCP: 3/11/2020 Reyna Hardin MD OR same dept: 3/11/2020 Reyna Hardin MD OR same specialty: 3/11/2020 Reyna Hardin MD  Last physical: 10/3/2017 Last MTM visit: Visit date not found   Next visit within 3 mo: Visit date not found  Next physical within 3 mo: Visit date not found  Prescriber OR PCP: Reyna Hardin MD  Last diagnosis associated with med order: 1. Hypercholesterolemia  - atorvastatin (LIPITOR) 40 MG tablet [Pharmacy Med Name: ATORVASTATIN TABS 40MG]; TAKE 1 TABLET AT BEDTIME  Dispense: 90 tablet; Refill: 3    If protocol passes may refill for 12 months if within 3 months of last provider visit (or a total of 15 months).

## 2021-06-11 NOTE — PROGRESS NOTES
"SUBJECTIVE: Kelley Styles is a 76 y.o. female with:  Chief Complaint   Patient presents with     Follow-up     diabetes check up   1) diabetes mellitus - She is on insulin- using both Lantus and Novolog.  She takes Lantus 60 units at nighttime.  She use using a range of Novolog up to 200 units a day.  She was on metformin in the past but not sure why the medicine was stopped.  She did attend one group visit for diabetes mellitus and found information helpful.    2) HTN - Blood pressure has been well controlled on the lisinopril/ HCTZ.    3) Elevated cholesterol - She continues on Lipitor.    4) Obesity - She got evaluated for bariatric survey.  She has not been able to make up her mind about following through with the surgery.  She was advised to go on a high protein diet ( 60 gm/ day)  to try to lose weight.  She did lose weight but her hair started to fall out.  She started to eat more carbs and the hair loss seems better but unfortunately she has gained weight.  She does take a multivitamin.       OBJECTIVE: /60 (Patient Site: Right Arm, Patient Position: Sitting, Cuff Size: Adult Large)  Pulse 70  Ht 5' 6.75\" (1.695 m)  Wt 217 lb (98.4 kg)  BMI 34.24 kg/m2 no distress        Wt Readings from Last 3 Encounters:   06/21/17 217 lb (98.4 kg)   01/17/17 202 lb (91.6 kg)   11/21/16 213 lb (96.6 kg)       Lab Results   Component Value Date    HGBA1C 7.6 (H) 06/21/2017      Kelley was seen today for follow-up.    Diagnoses and all orders for this visit:    Diabetes type 2, controlled - Worsening control despite high doses of insulin.  I think she could benefit from newer insulin- Tresiba and also consider adding back metformin.  I am going to have her meet with PharmD to discuss these options.  She is going to retry a diet with lower carbs and more protein but not to the extent she did before.  -     Glycosylated Hemoglobin A1c  -     HDL Cholesterol  -     LDL Cholesterol, Direct  -     Cholesterol, " Total  -     Microalbumin, Random Urine  -     LANTUS 100 unit/mL injection; INJECT 60 UNITS SUBCUTANEOUSLY AT BEDTIME  -     Ambulatory referral to Medication Management    Hypercholesterolemia- Check lipids.    Reyna Hardin

## 2021-06-12 NOTE — PROGRESS NOTES
MTM Initial Encounter  ASSESSMENT AND PLAN  Type 2 Diabetes:  poorly controlled. A1C was not at goal of <7%. FBP not at goal  mg/dL. Labs are up to date. Reviewed medication options for her. First, I would recommend reinitiating metformin. Kidney function is normal. Reviewed that metformin will help inhibit hepatic glucose production. Would recommend increasing by 500 mg weekly until at 2000 mg/day.   Next, can consider changing Lantus to Tresiba to offer more consistent blood sugars. Showed her how to use an insulin pen and provided her with samples. She would like to continue the Novolog vial for now.   Reviewed that she is on quite a significantly higher about of Novolog than Lantus. In addition, Novolog should be given 5-10 minutes BEFORE her meal. Currently she is chasing blood sugars which is a reactive approach. Also encouraged her to not give insulin before bedtime. Will reduce her Novolog to 20 units TID since she is not eating much and to have a more 50:50 ratio with basal insulin, but may need to adjust in the future.   Recommended to check before meals + 2 hours after dinner, reviewed goals.  Can consider adding Trulicity or Victoza in the future to help her lose weight.    Recommended that we recheck her microalbuminuria when her BG are better controlled.   PLAN:   1. Restart metformin  mg. Increase by 500 mg weekly until at 2000 mg/day   2. Change Lantus to Tresiba 60 units. Educated on how to use insulin pen.   3. Decrease Novolog to 20 units 5-10 minutes BEFORE meals  4. Do not give Novolog before bedtime  5. Check blood sugars before meals and 2 hours after dinner.     MTM FOLLOW UP  2 week MyChart  In person beginning of September     SUBJECTIVE AND OBJECTIVE  Kelley Styles is a 77 y.o. female here for an initial medication therapy management (MTM) appointment. Referred from Dr. Hardin for diabetes.     Medication Adherence: Takes all of her pills in the morning. Did not bring  her medications with her today.     Type 2 Diabetes: Currently taking Lantus 60 units HS, Novolog 70-80 units 3 times daily. Will give about 15 units before bed if BG >200. Usually gives novolog 1 hour after eating. She kind of guesses how much Novolog to give. She uses vials. Has nevr been on pens. Is open to change, sees commercials on TV for new medications.   Tends toward diarrhea every 2 weeks  The cost of insulin has gone up, but she is dot she can afford it.   Admitted 10 years ago for a hip replacement and was started on insulin, then discharged on insulin. Other therapies tried = metformin (stopped working) and Byetta (stopped working)  Her   of an MI after being on Avandia for 6 months.   Tests BG 4 times daily. Likes to keep on top of it. Did not bring glucometer. Reports blood sugars: Fasting AM = 180-210. During the day 250-300.  Last A1c checked 17 = 7.6%.   Hypoglycemia - sometimes overdoes Novolog and wakes up in a sweat, about every 3 months.   Microalbumin checked 17 ratio = 63.4. Does not check BP at home, no lightheadedness/dizziness.   Is taking atorvastatin 40 mg daily  Is taking aspirin 325 mg half tablet QOD (has a whole bottle at home) for primary prevention.   Is not hungry early or late in the day, mainly eats 11am-5pm.   BF 1 egg and toast white, sometimes wheat.  Lunch: likes [employer], eats a lot of chicken, chicken sandwhiches. Sometimes a grocery store pizza.   Salad about 5pm - spinach and strawberry salad.   If gardening or exercising her BG goes up. If she exercises   Lost 25 lbs last fall which did help her BG in the past.             Kelley's medication list was reviewed with them, discussing reason for use, directions for use, and potential side effects of each medication as needed. Indication, safety, efficacy, and convenience was assessed for all medications addressed above.  No environmental factors were noted currently affecting patient.  This care  plan was communicated via EMR with her primary care provider, Reyna Hardin MD, who is the authorizing prescriber for this visit.  Direct supervision was available by either the patient's PCP or other available provider.    Time and complexity billing metrics are included in the docflowsheet linked to this visit    Time spent: 60 min  Crystal Tiwari, Pharm.D., BCACP  MTM Pharmacist at [facility]

## 2021-06-13 NOTE — PROGRESS NOTES
SUBJECTIVE       Kelley Styles is a 80 y.o.  female with a PMH significant for:     Patient Active Problem List   Diagnosis     Vitamin D Deficiency     Nontoxic Solitary Thyroid Nodule     Excessive Sweating     Osteopenia     Type II diabetes mellitus, uncontrolled (H)     Carpal Tunnel Syndrome     Trigger Finger (Acquired)     Rosacea     Chronic low back pain     Osteoarthritis of both hands     Hypercholesterolemia     Tubular adenoma of colon     Hypertension     Cataract     Glaucoma     Macular degeneration     Morbid obesity (H)     GERD (gastroesophageal reflux disease)     Closed fracture of part of neck of femur (H)     Research exam     Superficial burn of multiple sites of wrist and hand     Diverticulitis of colon     Loss, sense of, smell     Chronic kidney disease, stage 3     She presents to establish care and has a couple concerns.    Patient notes she has a history of diverticulosis with 5 bouts of diverticulitis in the past.  Discussed the diagnosis of diverticulosis and possible prevention of diverticulitis.  Patient knows to go to the ER when she gets a fever and abdominal pain.    Patient has a history of macular degeneration. She has been seeing a specialist and gets shots in her eyes for the past few years. Patient does note that her site continues to degenerate. She is worried about losing her 's license and getting possible transportation. Discussed that at that time we can apply for [employer] mobility. Patient in agreement this plan.    Patient having bilateral hand cramps. Patient notes it when she is reading the newspaper. She notes has been going on for the last 6 months. Patient does see a hand specialist for trigger fingers and other arthritis in her hands. Upon chart review patient's last CMP noted regular salts. Patient did have a low magnesium. Encouraged her to continue taking magnesium. In addition advised her to watch her caffeine and water intake.    Patient has  "a history of diabetes. She is currently taking 70 units of Lantus at night and NovoLog with meals. Patient's not sure how how much NovoLog she is taking because it is based on her syringe and vial at home. Patient notes she often waits to check her blood sugar until 2 hours after eating and then takes her insulin. Advised that this is causing her to spike her blood sugars without resolution. Patient should have her insulin with her meals and check her glucose prior to eating. Patient also wondered about the ability to get a continuous glucose monitor. Patient has had one in the past but noted that the technology was not good at the time. Advised that I would discuss this with diabetes education    Patient had one episode of vaginal spotting. Patient signed to ensure it was spotting. She notes it is a dark brown blood. Nothing is happened since that time. Patient notes this is happened in the past when she saw Dr. Faye. She had a ultrasound and was advised to follow-up if it happened again.    Patient notes she is lost her sense of smell. This happened about 2 years ago. Patient does note significant nasal congestion and rhinorrhea. Patient also has issues with ear popping. Patient has never tried a medication for this    PMHx, PSHx, Social Hx, Family Hx, Medications, and Allergies reviewed and updated in chart as needed.         REVIEW OF SYSTEMS     Pertinent items are noted in HPI.  A 12 point comprehensive review of systems was negative except as noted.        OBJECTIVE     Vitals:    12/11/20 1217   BP: 132/58   Patient Site: Left Arm   Patient Position: Sitting   Cuff Size: Adult Regular   Pulse: 82   SpO2: 98%   Weight: 211 lb 12.8 oz (96.1 kg)   Height: 5' 7\" (1.702 m)     Body mass index is 33.17 kg/m .    Constitutional: Awake, alert, cooperative, no apparent distress, and appears stated age.  Eyes: Lids and lashes normal, sclera clear, conjunctiva normal.  ENT: Normocephalic, without obvious abnormality, " atramatic,   Lungs: No increased work of breathing, good air exchange, clear to auscultation bilaterally, no crackles or wheezing.  Cardiovascular: Regular rate and rhythm, normal S1 and S2, no S3 or S4, and no murmur noted.  Musculoskeletal: No redness, warmth, or swelling of the joints.  Full range of motion noted.   Neurologic: Awake, alert, oriented to name, place and time.  Cranial nerves II-XII are grossly intact and gait is normal.    ASSESSMENT AND PLAN     Kelley was seen today for establish care.    Diagnoses and all orders for this visit:    Encounter for medical examination to establish care: Patient presented to establish care no acute concerns noted but lots of topics discussed.    Diverticulitis of colon: Discussed the etiology and prevention of diverticulitis    Loss, sense of, smell: Patient notes this has been chronic for last 2 years but also notes significant sinus symptoms. Will try Flonase  -     fluticasone (VERAMYST) 27.5 mcg/actuation nasal spray; 1 spray into each nostril daily.    Nasal congestion with rhinorrhea  -     fluticasone (VERAMYST) 27.5 mcg/actuation nasal spray; 1 spray into each nostril daily.    Stage 3a chronic kidney disease: Noted on GFR. We will keep this in mind with diabetes    Uncontrolled type 2 diabetes mellitus with hyperglycemia (H): Patient's most recent A1c was 7.2. It is been above 7 for the last few readings. After discussing with patient she is likely taking her insulin incorrectly. Advised her to take her insulin correctly and check 4 times daily. Patient will follow up in 1 month for wellness exam. If we have time we will touch base on diabetes then or have her do a separate visit in 3 months. They considering adding a another medication given the kidney disease or cardiovascular prevention provided. Will discuss with diabetes educator    Vaginal spotting: Patient noted 1 episode of vaginal spotting. This is occurred once in the past. Because of this believe  she should be reevaluated by ultrasound or gynecology for possible biopsy. Will order at next visit.      RTC in 1month for wellness exam or sooner if develops new or worsening symptoms.    Harmony Ireland DO

## 2021-06-13 NOTE — TELEPHONE ENCOUNTER
11-16-20  talked to pt to schedule Mammogram, pt advised she wants to wait until the [place] clinic opens back up thats where she wishes to schedule at i provided pt with Radiology phone # to schedule  Destiny

## 2021-06-13 NOTE — TELEPHONE ENCOUNTER
Orders being requested: Mammogram  Reason service is needed/diagnosis: Annual  When are orders needed by: ASAP  Where to send Orders: Epic  Okay to leave detailed message?  Yes, please call once orders are placed so she can schedule.

## 2021-06-13 NOTE — PROGRESS NOTES
PREOPERATIVE HISTORY AND PHYSICAL EXAM        SUBJCTIVE  Patient is being seen today for Preoperative Consultation at the request of Dr. Owen Klein for the underlying condition of cataract in right eye.  Surgery Date:  10/12/2017  Scheduled Surgery: Right eye Cataract/ shunt placed behind cornea @ [facility] F: [phone], [facility] F: [phone]      Exam Date:  10/3/2017  Examiner: Dr. Reyna Hardin MD ([facility])  Primary Provider: Reyna Hardin MD    HISTORY OF PRESENT ILLNESS:  Kelley Sytles is a 77 y.o. female with cataract in right eye.    PAST MEDICAL:  Patient has Vitamin D Deficiency; Nontoxic Solitary Thyroid Nodule; Esophageal Reflux; Excessive Sweating; Osteopenia; Diabetes type 2, controlled; Carpal Tunnel Syndrome; Trigger Finger (Acquired); Rosacea; Chronic low back pain; Osteoarthritis of both hands; Hypercholesterolemia; Tubular adenoma of colon; Hypertension; Cataract; Glaucoma; Macular degeneration; Morbid obesity; and GERD (gastroesophageal reflux disease) on her problem list..  PAST SURGICAL HISTORY:     She  has a past surgical history that includes total hip arthroplasty; Appendectomy; Knee arthroscopy; Tonsillectomy; thyroid biopsy; Carpal tunnel release (Left); Trigger finger release (Left); and Cataract extraction (Left, 01/2017)..  Anesthesia Reactions: None  Bleeding Disorders: None    CURRENT MEDICATIONS   Current Outpatient Prescriptions   Medication Sig Dispense Refill     aspirin 325 MG tablet Half of 325mg every other day        atorvastatin (LIPITOR) 40 MG tablet TAKE ONE TABLET BY MOUTH ONCE DAILY AT BEDTIME 90 tablet 2     blood glucose test (ONETOUCH ULTRA TEST) strips Use 1 each As Directed 4 (four) times a day. Dispense brand per patient's insurance at pharmacy discretion. (E11.9) 200 strip 2     cholecalciferol, vitamin D3, (VITAMIN D3) 2,000 unit cap Take 1 capsule by mouth 2 (two) times a day.        "insulin degludec 100 unit/mL (3 mL) InPn Inject 60 Units under the skin at bedtime. 30 mL 0     lancets (ONETOUCH DELICA LANCETS) 33 gauge Misc Use to check blood sugars four times daily 100 each 11     latanoprost (XALATAN) 0.005 % ophthalmic solution Administer 1 drop to both eyes at bedtime.       lisinopril-hydrochlorothiazide (PRINZIDE,ZESTORETIC) 10-12.5 mg per tablet Take 1 tablet by mouth daily before breakfast. 90 tablet 3     metFORMIN (GLUCOPHAGE XR) 500 MG 24 hr tablet Take 4 tablets (2,000 mg total) by mouth daily with breakfast. 360 tablet 0     multivitamin with minerals (THERA-M) 9 mg iron-400 mcg Tab tablet Take 1 tablet by mouth daily.       NOVOLOG 100 unit/mL injection Use as directed up to 200 units a day, prn 70 mL 3     pen needle, diabetic (BD ULTRA-FINE WALTER PEN NEEDLES) 32 gauge x 5/32\" Ndle Use to inject Tresiba once daily 100 each 3     LANTUS 100 unit/mL injection INJECT 60 UNITS SUBCUTANEOUSLY AT BEDTIME 10 mL 3     No current facility-administered medications for this visit.    .  ALLERGIES  No latex allergies.  No Known Allergies    HABITS & SOCIAL HISTORY:  She  reports that she has never smoked. She has never used smokeless tobacco.  She  reports that she does not drink alcohol.  Single. Retired.      FAMILY HISTORY:  Her family history includes Breast cancer in her paternal aunt; Dementia in her mother; Heart disease in her father, mother, paternal grandfather, and paternal grandmother; No Medical Problems in her brother and maternal grandfather; Obesity in her paternal aunt; Other in her maternal grandmother; Stroke in her father and mother. There is no history of BRCA 1/2, Cancer, Colon cancer, Endometrial cancer, or Ovarian cancer.  Anesthesia Reactions: None  Bleeding Disorders: None    REVIEW OF SYSTEMS:  History of Present Illness  Recent Health  Fever: no  Chills: no  Fatigue: no  Chest Pain: no  Cough: no  Dyspnea: no  Urinary Frequency: no  Nausea: no  Vomiting: " "no  Diarrhea: no  Abdominal Pain: no  Easy Bruising: no  Lower Extremity Swelling: no  Poor Exercise Tolerance: no    diabetes mellitus: Blood sugar running 200s in the am and 140 - 280 during the day.    .     CARDIOVASCULAR: Negative for CAD, CHF, Valvular Disease, HTN, Atrial Fibrillation, Other Arrhythmia, ICD/Pacer, Peripheral Vascular Disease.  PULMONARY: Negative for COPD, Asthma, Apnea.  RENAL: Negative for  Renal Failure, Dialysis.  On diuretic.  ENDOCRINE: Negative for Chronic steroid use, Thyroid Disease.  Diabetes on insulin/ metformin.  GI: Negative for Hiatal Hernia, Hepatic Disease.  NEURO: Negative for CVA, TIA, Seizures, Neuropathy.  HEMATOLOGIC DISEASE: Negative for Clotting Disorder, Bleeding Disorder, Anemia.  MUSCULOSKELETAL: Negative for Muscular Dystrophy.  She has some osteoarthritis.  MENTAL HEALTH: Negative for Anxiety, Depression, Dementia, ETOH/Drug History.  OTHER: Negative for increased risk for excessive blood loss, potential for refusing blood products, MRSA, VRE, HIV, AIDS, TB or pregnancy in women.    PHYSICAL EXAMINATION  CONSTITUTIONAL - No acute distress. /70 (Patient Site: Right Arm, Patient Position: Sitting, Cuff Size: Adult Regular)  Pulse 68  Temp 98.4  F (36.9  C) (Oral)   Resp 16  Ht 5' 6.73\" (1.695 m)  Wt 210 lb (95.3 kg)  SpO2 98% Comment: ra  Breastfeeding? No  BMI 33.16 kg/m2  SKIN - No visual abnormalities, including no rash, lesions or ulcers.  EYES - Normal conjunctiva and lids. Extraoccular movements intact.  Pupils equal, reactive to light and accomodation..  ENMT - Ears and nose externally unremarkable.  Normal external auditory canal and tympanic membranes.  Hearing grossly normal.  Nares unremarkable.  Unremarkable lips, gums and teeth.  Normal oropharynx, including mucosa, salivary glands, palate, tongue, or tonsils.  NECK - Symmetric, trachea midline, no masses, overall unremarkable.  No thyromegaly, tenderness or mass.  RESPIRATORY - Normal " respiratory effort.  Normal percussion.  Clear to auscultation bilaterally.  CARDIAC - Regular rate and rhythm, normal S1 S2.  No murmurs rubs or gallops.  Normal and symmetric pedal pulses.  No edema or varicosities.  GI/ABDOMEN - Soft, Non-tender, non-distended, bowel sounds present.  No hepatosplenomegaly. No abdominal hernia.  MUSCULOSKELETAL - Normal gait and station.  Normal digits and nails.  Normal bones, joints and muscles including head and neck, spine and pelvis, and extremities.  NEUROLOGIC - Cranial Nerves II-XII intact.  Deep tendon reflex symmetric bilaterally.  PSYCHIATRIC - Mood and affect bright and congruent.  Good insight and judgement.  Oriented to time, place and person.  Good eye contact.  LYMPH/HEME - Normal    DATA:  Recent Results (from the past 240 hour(s))   Comprehensive Metabolic Panel   Result Value Ref Range    Sodium 142 136 - 145 mmol/L    Potassium 4.4 3.5 - 5.0 mmol/L    Chloride 103 98 - 107 mmol/L    CO2 31 22 - 31 mmol/L    Anion Gap, Calculation 8 5 - 18 mmol/L    Glucose 134 (H) 70 - 125 mg/dL    BUN 22 8 - 28 mg/dL    Creatinine 0.98 0.60 - 1.10 mg/dL    GFR MDRD Af Amer >60 >60 mL/min/1.73m2    GFR MDRD Non Af Amer 55 (L) >60 mL/min/1.73m2    Bilirubin, Total 0.3 0.0 - 1.0 mg/dL    Calcium 9.7 8.5 - 10.5 mg/dL    Protein, Total 7.1 6.0 - 8.0 g/dL    Albumin 3.4 (L) 3.5 - 5.0 g/dL    Alkaline Phosphatase 77 45 - 120 U/L    AST 22 0 - 40 U/L    ALT 25 0 - 45 U/L   Glycosylated Hemoglobin A1c   Result Value Ref Range    Hemoglobin A1c 7.2 (H) 3.5 - 6.0 %   Microalbumin, Random Urine   Result Value Ref Range    Microalbumin, Random Urine 1.21 0.00 - 1.99 mg/dL    Creatinine, Urine 57.5 mg/dL    Microalbumin/Creatinine Ratio Random Urine 21.0 (H) <=19.9 mg/g            PLAN:  Patient approved for surgery with local anesthesia.  Post-operative pain to be managed by Surgeon during post-operative timeframe, as defined by [facility]  Postoperative Care will be managed  by Hospital Service, if admitted.  Stop Aspirin, NSAID's (Ibuprofen, Aleve), fish oil   Hold supplements 10 days before surgery.  Above recommendations were reviewed with patient.    Patient Instructions   Take 1/2 of your Tresiba dose at bedtime prior to day of surgery.    Hold metformin and Novolog insulin on am of procedure.    Increase dose of Tresiba to 65 units.        Reyna Hardin MD, [facility].    [address] [place] [id number]  PHONE: [phone], FAX: [phone]    Preoperative Cardiac Risk Stratificaiton and Management   Cardiac risk assessment - Low.   Beta-blocker protocal - Not indicated  NO active Cardiac Conditions including noUnstable/Severe Angina, Recent Myocardial Infarction (<3 mo), New, worsening or decompensated heart failure, Significant Arrhythmias, or Severe Valvular Disease]    Low Risk Surgery.  Functional Capacity > 4 METS.  No Beta Blockage/cardiac evaluation:  No Ischemic Heart Disease; Compensated or prior heart failure; Diabetes mellitus;  Chronic kidney disease; Cerebrovascular disease.     Final Disposition  Proceed with proposed surgery without additional clinical clarifications  No Cardiology consultation or non-invasive testing.   ___________________________________________    Reyna Hardin MD, [facility].    [address] [place] [id number]  PHONE: [phone], FAX: [phone]

## 2021-06-13 NOTE — PROGRESS NOTES
"Kelley Styles is a 80 y.o. female who is being evaluated via a billable telephone visit.      The patient has been notified of following:     \"This telephone visit will be conducted via a call between you and your physician/provider. We have found that certain health care needs can be provided without the need for a physical exam.  This service lets us provide the care you need with a short phone conversation.  If a prescription is necessary we can send it directly to your pharmacy.  If lab work is needed we can place an order for that and you can then stop by our lab to have the test done at a later time.    Telephone visits are billed at different rates depending on your insurance coverage. During this emergency period, for some insurers they may be billed the same as an in-person visit.  Please reach out to your insurance provider with any questions.    If during the course of the call the physician/provider feels a telephone visit is not appropriate, you will not be charged for this service.\"    Patient has given verbal consent to a Telephone visit? Yes    What phone number would you like to be contacted at? [phone]    Patient would like to receive their AVS by AVS Preference: Mail a copy.     SUBJECTIVE: Kelley Styles is a 80 y.o. female with:  Chief Complaint   Patient presents with     ORDER REQUEST     MAMMOGRAM     1) diabetes mellitus type 2 - She is on insulin- Novolog and Lantus. She did lose 20 lbs in the last 2 months.  She is cooking her own meals and not eating out.  Has h/o macular degeneration - she gets eye injections monthly.      2) She is interested in having a routine mammogram.  No breast symptoms.      3) She saw Dr. Faye for vaginal spotting a few years ago.  She had exam and no worrisome findings..  No further spotting until recently.  Now having very light intermittent spotting from vagina.    SH: Single.  Lives alone.  Patient Active Problem List   Diagnosis     Vitamin D " Deficiency     Nontoxic Solitary Thyroid Nodule     Excessive Sweating     Osteopenia     Type II diabetes mellitus, uncontrolled (H)     Carpal Tunnel Syndrome     Trigger Finger (Acquired)     Rosacea     Chronic low back pain     Osteoarthritis of both hands     Hypercholesterolemia     Tubular adenoma of colon     Hypertension     Cataract     Glaucoma     Macular degeneration     Morbid obesity (H)     GERD (gastroesophageal reflux disease)     Closed fracture of part of neck of femur (H)     Research exam     Superficial burn of multiple sites of wrist and hand        Additional provider notes: no     OBJECTIVE: no distress    Lab Results   Component Value Date    HGBA1C 7.4 (H) 02/10/2020         Assessment/Plan:  1. Controlled type 2 diabetes mellitus with diabetic nephropathy, with long-term current use of insulin (H)  I suspect her control will improve with recent weight loss.  She is going to get labs then will make appointment with Dr. Harmony Ireland as she feels it will be too far to travel to see me in [place].  - Lipid Cascade / A1C / magnesium ; Future  - Comprehensive Metabolic Panel; Future    2. Morbid obesity (H)  Good weight loss recently. Continue current diet plan.    3. Post-menopause bleeding  She needs another exam and possibly endometrial biopsy.  Will start with gyn exam with Dr. Ireland.        Phone call duration:  12 minutes    Cuate Mooney [facility]   Reyna Hardin

## 2021-06-14 NOTE — TELEPHONE ENCOUNTER
Central PA team  [phone]  Pool: HE PA MED ([id number])          PA has been initiated.       PA form completed and faxed insurance via Cover My Meds     Gay:  ARMANI YANEZ (Key: [id number])      Medication:  FreeStyle Liane 14 Day Sensor     Insurance:  MEDICARE/UCARE/TAYLA        Response will be received via fax and may take up to 5-10 business days depending on plan

## 2021-06-14 NOTE — PROGRESS NOTES
CC: I was asked to see Kelley Styles by Dr Ireland secondary to postmenopausal bleeding.    HPI: The pt is a 80 y.o. WWF P0 who presents with very light spotting over the last 6 weeks or so.  She has had issues in the past as well.  She had an endometrial biopsy on 7/30/15 that showed an infarcted polyp and was otherwise normal.  She had a return of bleeding in 2016.  Ultrasound was done 7/7/16; it showed multiple fibroids with an unclear endometrium because of the fibroids.  The right ovary was normal; the left wasn't visualized.  She was to return for an endometrial biopsy after that, but that didn't happen.  She states the bleeding stopped after that until recently.  She isn't having pain.  She is not sexually active.  She states the spotting is very light.    Past Medical History:   Diagnosis Date     Arthritis      Cataract      Diabetes mellitus (H)      Diverticulitis 2004     GERD (gastroesophageal reflux disease)      Glaucoma      Hyperlipidemia      Hypertension      Low back pain      Low vitamin D level 8/24/2016     Lower leg edema      Macular degeneration      Metabolic syndrome      Morbid obesity (H)      Osteopenia      Personal history of colonic polyps     adenomatous, removed 2015 colonoscopy       Past Surgical History:   Procedure Laterality Date     APPENDECTOMY       CARPAL TUNNEL RELEASE Left      CATARACT EXTRACTION Left 01/2017     KNEE ARTHROSCOPY       AR TOTAL HIP ARTHROPLASTY      Description: Total Hip Replacement;  Recorded: 03/29/2012;     thyroid biopsy       TONSILLECTOMY       TRIGGER FINGER RELEASE Left        Patient's   Family History   Problem Relation Age of Onset     Dementia Mother      Stroke Mother      Heart disease Mother      Heart disease Father      Stroke Father      Other Maternal Grandmother         STROKE SYNDROM     No Medical Problems Maternal Grandfather      Heart disease Paternal Grandmother      Heart disease Paternal Grandfather      Breast cancer  Paternal Aunt         late 70's     Obesity Paternal Aunt      No Medical Problems Brother      BRCA 1/2 Neg Hx      Cancer Neg Hx      Colon cancer Neg Hx      Endometrial cancer Neg Hx      Ovarian cancer Neg Hx        Patient   Social History     Socioeconomic History     Marital status:      Spouse name: None     Number of children: None     Years of education: None     Highest education level: None   Occupational History     None   Social Needs     Financial resource strain: None     Food insecurity     Worry: None     Inability: None     Transportation needs     Medical: None     Non-medical: None   Tobacco Use     Smoking status: Never Smoker     Smokeless tobacco: Never Used     Tobacco comment: pt states that 50yrs+, smoked for x 1 year only   Substance and Sexual Activity     Alcohol use: No     Comment: rare wine out for lunch     Drug use: No     Sexual activity: Never     Partners: Male     Birth control/protection: Post-menopausal   Lifestyle     Physical activity     Days per week: None     Minutes per session: None     Stress: None   Relationships     Social connections     Talks on phone: None     Gets together: None     Attends Anabaptism service: None     Active member of club or organization: None     Attends meetings of clubs or organizations: None     Relationship status: None     Intimate partner violence     Fear of current or ex partner: None     Emotionally abused: None     Physically abused: None     Forced sexual activity: None   Other Topics Concern     None   Social History Narrative    ,     Lives alone with a cat    Retired  for [employer]    No children       Outpatient Medications Prior to Visit   Medication Sig Dispense Refill     aspirin 325 MG tablet 650 mg every 6 (six) hours as needed. Half of 325mg every other day        atorvastatin (LIPITOR) 40 MG tablet TAKE 1 TABLET AT BEDTIME 90 tablet 2     blood glucose test (ONETOUCH ULTRA TEST) strips  "Use 1 each As Directed 4 (four) times a day. Dispense brand per patient's insurance at pharmacy discretion. (E11.9) 400 strip 3     cholecalciferol, vitamin D3, (VITAMIN D3) 50 mcg (2,000 unit) Tab Take 1 tablet (2,000 Units total) by mouth daily. 90 tablet 3     clobetasol (TEMOVATE) 0.05 % external solution        flash glucose scanning reader (FREESTYLE EVERARDO 14 DAY READER) Misc Use 1 Device As Directed daily. 1 each 0     flash glucose sensor (FREESTYLE EVERARDO 14 DAY SENSOR) Kit Use 1 Device As Directed every 14 (fourteen) days. Follow manufactures instructions 2 kit 11     fluticasone (VERAMYST) 27.5 mcg/actuation nasal spray 1 spray into each nostril daily. 10 g 12     ibuprofen (ADVIL,MOTRIN) 600 MG tablet TK 1 T PO Q 6 H PRN P       insulin aspart U-100 (NOVOLOG U-100 INSULIN ASPART) 100 unit/mL injection Inject before meals 3x daily up to 300 U / day 10 mL 3     insulin syringe-needle U-100 0.3 mL 31 gauge x 5/16\" Syrg Use as directed for insulin 300 each 5     lancets (ONETOUCH DELICA LANCETS) 33 gauge Misc Use to check blood sugars four times daily 100 each 11     LANTUS U-100 INSULIN 100 unit/mL vial INJECT 78 UNITS UNDER THE SKIN AT BEDTIME 120 mL 2     latanoprost (XALATAN) 0.005 % ophthalmic solution Administer 1 drop to both eyes at bedtime.       liraglutide (VICTOZA) 0.6 mg/0.1 mL (18 mg/3 mL) injection Inject 0.1 mL (0.6 mg total) under the skin daily. With or without food. Increase to 1.2 after after two weeks. 3 mL 0     lisinopril-hydrochlorothiazide (PRINZIDE,ZESTORETIC) 10-12.5 mg per tablet TAKE 1 TABLET BY MOUTH BEFORE BREAKFAST 90 tablet 3     magnesium oxide (MAGOX) 400 mg (241.3 mg magnesium) tablet Take 1 tablet (400 mg total) by mouth daily. 200 tablet 1     multivitamin with minerals (THERA-M) 9 mg iron-400 mcg Tab tablet Take 1 tablet by mouth daily.       ONETOUCH ULTRA BLUE TEST STRIP strips USE ONE STRIP TO CHECK GLUCOSE 4 TIMES DAILY 400 strip 2     pen needle, diabetic (BD " "ULTRA-FINE WALTER PEN NEEDLE) 32 gauge x 5/32\" Ndle Use 1 each As Directed daily. 30 each 1     syringe, disposable, 20 mL Syrg Ultracomfort 1 cc 20gx 1/2\" syringes, 120 each prn     No facility-administered medications prior to visit.        Patient has No Known Allergies.    ROS:  12 part ROS is negative aside from those symptoms in the HPI    PE:  /67 (Patient Site: Right Arm, Patient Position: Sitting, Cuff Size: Adult Regular)   Pulse 78   Ht 5' 6.54\" (1.69 m)   Wt 213 lb (96.6 kg)           Body mass index is 33.82 kg/m .    General: obese WF, NAD  EG/BUS wnl  Vagina no lesions, no abnormal discharge, atrophic  Cervix no lesions, tenaculum was used, os finder was not used  Uterus sounds to 9 cm, 2 passes with small return  Pt tolerated procedure well  Psych: normal mood  Neuro: CN I-XII grossly intact  MS: normal gait    Assessment: 80 y.o. WWF P0 with PMB, successful EMB.    Plan: Natural history of PMB causes discussed with the patient.  We discussed her past history again and the rationale around doing another biopsy today.  She is in agreement.  She will be notified when the results are back; her MyChart isn't working well, so she'll get a call.  Questions were answered to the best of my ability.    Prep time: 8 min  In room discussion 10 min  In room procedure 9 min        "

## 2021-06-14 NOTE — TELEPHONE ENCOUNTER
Central PA team  [phone]  Pool: HE PA MED ([id number])          PA has been initiated.       PA form completed and faxed insurance via Cover My Meds     Key:  [id number]     Medication:  Victoza 18MG/3ML pen-injectors    Insurance:  [employer]         Response will be received via fax and may take up to 5-10 business days depending on plan

## 2021-06-14 NOTE — PROGRESS NOTES
Assessment and Plan:   Patient has been advised of split billing requirements and indicates understanding: Yes     Kelley was seen today for annual wellness visit.    Routine general medical examination at a health care facility: Patient due for a DEXA scan and vitamin D check for osteopenia.  We will do those today.  Patient due for hepatitis screen.  Patient's HIV screen was negative in the past.  Patient with increased ASCVD risk.  Confirm that she is on a statin therapy.  Patient has her mammogram scheduled.  -     Vitamin D, Total (25-Hydroxy)  -     Hepatitis C Antibody (Anti-HCV)  -     magnesium oxide (MAGOX) 400 mg (241.3 mg magnesium) tablet; Take 1 tablet (400 mg total) by mouth daily.    Type 2 diabetes mellitus with hyperglycemia, with long-term current use of insulin (H): Given patient's elevated glucoses at home discussed adding a GLP-1 for cardioprotection.  Given the weight loss benefits as well we will add Victoza.  We will start at lowest dose.  Patient to increase to midline dose on her own.  Will reevaluate prior to increasing to final dose.  Patient does qualify for a continuous glucose monitor.  Patient is currently checking her blood sugars 4-6 times daily.  Will start the paperwork to obtain this.  Diabetic foot exam completed today.  -     liraglutide (VICTOZA) 0.6 mg/0.1 mL (18 mg/3 mL) injection; Inject 0.1 mL (0.6 mg total) under the skin daily. With or without food. Increase to 1.2 after after two weeks.    Macular degeneration (senile) of retina: Complication of diabetes    Postmenopausal bleeding: Patient continues to have some spotting.  Upon chart review of Dr. Regalado prior records patient should be seen for reevaluation and possible endometrial biopsy.  -     Ambulatory referral to Obstetrics / Gynecology    Vitamin deficiency  Vitamin D deficiency: Patient has a history of vitamin D deficiency and osteopenia.  We will recheck this today.  -     Vitamin D, Total (25-Hydroxy)  -      cholecalciferol, vitamin D3, (VITAMIN D3) 50 mcg (2,000 unit) Tab; Take 1 tablet (2,000 Units total) by mouth daily.    Postmenopausal  -     DXA Bone Density Scan; Future    The patient's current medical problems were reviewed.    I have had an Advance Directives discussion with the patient.  Provided her with paperwork.    The following health maintenance schedule was reviewed with the patient and provided in printed form in the after visit summary:   Health Maintenance   Topic Date Due     DEXA SCAN  1940     ZOSTER VACCINES (1 of 2) 07/25/1992     DIABETIC FOOT EXAM  07/09/2019     MAMMOGRAM  04/19/2020     DIABETIC EYE EXAM  03/04/2021     MICROALBUMIN  03/11/2021     A1C  06/07/2021     BMP  12/07/2021     LIPID  12/07/2021     MEDICARE ANNUAL WELLNESS VISIT  01/04/2022     FALL RISK ASSESSMENT  01/04/2022     TD 18+ HE  07/20/2025     COLORECTAL CANCER SCREENING  09/02/2025     ADVANCE CARE PLANNING  01/04/2026     HEPATITIS C SCREENING  Completed     Pneumococcal Vaccine: 65+ Years  Completed     INFLUENZA VACCINE RULE BASED  Completed     Pneumococcal Vaccine: Pediatrics (0 to 5 Years) and At-Risk Patients (6 to 64 Years)  Aged Out        Subjective:   Chief Complaint: Kelley Styles is an 80 y.o. female here for an Annual Wellness visit.   HPI: Patient has a history of type 2 diabetes.  At her last visit we discussed how to correctly take insulin therapy correctly.  Patient's morning readings continue to be 130-160.  Patient has been taking her insulin prior to eating.  Patient notes this has been making her more hungry when she does eat.  Patient notes it takes a while for the insulin to take effect compared to her meal.  Patient is currently taking 78 units of long-acting at night and short acting with each meal.  Patient on aware of how much short acting she is taking because she does not know what it is on the bottle.  Discussed that since patient's readings are still elevated despite  changing the insulin therapy would advise starting a new medication.  GLP-1's have good cardioprotection that would be beneficial for her.  Patient in agreement with this plan.  In addition we discussed a continuous glucose monitor.  Per diabetes educator she would qualify for one in the technology has improved since her last one.  Patient checks her blood sugars at least 4 times daily.  Sometimes upwards of 6.  Patient does note that she occasionally has lows.  She describes this as a flushed feeling in her face and she takes a small piece of candy.  Patient would like to pursue a continuous glucose monitor.    Patient continues to have mild episodes of vaginal spotting.  Per my review of Dr. Faye prior notes encouraged her to follow-up with Dr. Faye for reevaluation and possible endometrial biopsy.    Patient has a history of osteopenia.  We will check her vitamin D today.  Patient is due for a new DEXA scan.    The 10-year ASCVD risk score (Barton ABI Jr., et al., 2013) is: 43%    Values used to calculate the score:      Age: 80 years      Sex: Female      Is Non- : No      Diabetic: Yes      Tobacco smoker: No      Systolic Blood Pressure: 122 mmHg      Is BP treated: Yes      HDL Cholesterol: 39 mg/dL      Total Cholesterol: 136 mg/dL      Patient notes no other new questions or concerns.    Review of Systems:  Please see above.  The rest of the review of systems are negative for all systems.    Patient Care Team:  Harmony Ireland DO as PCP - General (Family Medicine)  Shirin Faye MD as Physician (Obstetrics and Gynecology)  Owen Klein MD as Physician (Ophthalmology)  Crystal Tiwari, PharmD as Pharmacist (Pharmacist)  eRyna Hardin MD as Assigned PCP     Patient Active Problem List   Diagnosis     Vitamin D Deficiency     Nontoxic Solitary Thyroid Nodule     Excessive Sweating     Osteopenia     Type II diabetes mellitus, uncontrolled (H)     Carpal  Tunnel Syndrome     Trigger Finger (Acquired)     Rosacea     Chronic low back pain     Osteoarthritis of both hands     Hypercholesterolemia     Tubular adenoma of colon     Hypertension     Cataract     Glaucoma     Macular degeneration     Morbid obesity (H)     GERD (gastroesophageal reflux disease)     Closed fracture of part of neck of femur (H)     Research exam     Superficial burn of multiple sites of wrist and hand     Diverticulitis of colon     Loss, sense of, smell     Chronic kidney disease, stage 3     Past Medical History:   Diagnosis Date     Arthritis      Cataract      Diabetes mellitus (H)      Diverticulitis 2004     GERD (gastroesophageal reflux disease)      Glaucoma      Hyperlipidemia      Hypertension      Low back pain      Low vitamin D level 8/24/2016     Lower leg edema      Macular degeneration      Metabolic syndrome      Morbid obesity (H)      Osteopenia      Personal history of colonic polyps     adenomatous, removed 2015 colonoscopy      Past Surgical History:   Procedure Laterality Date     APPENDECTOMY       CARPAL TUNNEL RELEASE Left      CATARACT EXTRACTION Left 01/2017     KNEE ARTHROSCOPY       IN TOTAL HIP ARTHROPLASTY      Description: Total Hip Replacement;  Recorded: 03/29/2012;     thyroid biopsy       TONSILLECTOMY       TRIGGER FINGER RELEASE Left       Family History   Problem Relation Age of Onset     Dementia Mother      Stroke Mother      Heart disease Mother      Heart disease Father      Stroke Father      Other Maternal Grandmother         STROKE SYNDROM     No Medical Problems Maternal Grandfather      Heart disease Paternal Grandmother      Heart disease Paternal Grandfather      Breast cancer Paternal Aunt         late 70's     Obesity Paternal Aunt      No Medical Problems Brother      BRCA 1/2 Neg Hx      Cancer Neg Hx      Colon cancer Neg Hx      Endometrial cancer Neg Hx      Ovarian cancer Neg Hx       Social History     Socioeconomic History      Marital status:      Spouse name: Not on file     Number of children: Not on file     Years of education: Not on file     Highest education level: Not on file   Occupational History     Not on file   Social Needs     Financial resource strain: Not on file     Food insecurity     Worry: Not on file     Inability: Not on file     Transportation needs     Medical: Not on file     Non-medical: Not on file   Tobacco Use     Smoking status: Never Smoker     Smokeless tobacco: Never Used     Tobacco comment: pt states that 50yrs+, smoked for x 1 year only   Substance and Sexual Activity     Alcohol use: No     Comment: rare wine out for lunch     Drug use: No     Sexual activity: Never     Partners: Male     Birth control/protection: Post-menopausal   Lifestyle     Physical activity     Days per week: Not on file     Minutes per session: Not on file     Stress: Not on file   Relationships     Social connections     Talks on phone: Not on file     Gets together: Not on file     Attends Synagogue service: Not on file     Active member of club or organization: Not on file     Attends meetings of clubs or organizations: Not on file     Relationship status: Not on file     Intimate partner violence     Fear of current or ex partner: Not on file     Emotionally abused: Not on file     Physically abused: Not on file     Forced sexual activity: Not on file   Other Topics Concern     Not on file   Social History Narrative    ,     Lives alone with a cat    Retired  for [employer]    No children      Current Outpatient Medications   Medication Sig Dispense Refill     aspirin 325 MG tablet 650 mg every 6 (six) hours as needed. Half of 325mg every other day        atorvastatin (LIPITOR) 40 MG tablet TAKE 1 TABLET AT BEDTIME 90 tablet 2     blood glucose test (ONETOUCH ULTRA TEST) strips Use 1 each As Directed 4 (four) times a day. Dispense brand per patient's insurance at pharmacy discretion. (E11.9)  "400 strip 3     clobetasol (TEMOVATE) 0.05 % external solution        fluticasone (VERAMYST) 27.5 mcg/actuation nasal spray 1 spray into each nostril daily. 10 g 12     ibuprofen (ADVIL,MOTRIN) 600 MG tablet TK 1 T PO Q 6 H PRN P       insulin aspart U-100 (NOVOLOG U-100 INSULIN ASPART) 100 unit/mL injection Inject before meals 3x daily up to 300 U / day 10 mL 3     insulin syringe-needle U-100 0.3 mL 31 gauge x 5/16\" Syrg Use as directed for insulin 300 each 5     lancets (ONETOUCH DELICA LANCETS) 33 gauge Misc Use to check blood sugars four times daily 100 each 11     LANTUS U-100 INSULIN 100 unit/mL vial INJECT 78 UNITS UNDER THE SKIN AT BEDTIME 120 mL 2     latanoprost (XALATAN) 0.005 % ophthalmic solution Administer 1 drop to both eyes at bedtime.       lisinopril-hydrochlorothiazide (PRINZIDE,ZESTORETIC) 10-12.5 mg per tablet TAKE 1 TABLET BY MOUTH BEFORE BREAKFAST 90 tablet 3     multivitamin with minerals (THERA-M) 9 mg iron-400 mcg Tab tablet Take 1 tablet by mouth daily.       ONETOUCH ULTRA BLUE TEST STRIP strips USE ONE STRIP TO CHECK GLUCOSE 4 TIMES DAILY 400 strip 2     syringe, disposable, 20 mL Syrg Ultracomfort 1 cc 20gx 1/2\" syringes, 120 each prn     cholecalciferol, vitamin D3, (VITAMIN D3) 50 mcg (2,000 unit) Tab Take 1 tablet (2,000 Units total) by mouth daily. 90 tablet 3     flash glucose scanning reader (FREESTYLE [place]ERARDO 14 DAY READER) Misc Use 1 Device As Directed daily. 1 each 0     flash glucose sensor (FREESTYLE EVERARDO 14 DAY SENSOR) Kit Use 1 Device As Directed every 14 (fourteen) days. Follow manufactures instructions 2 kit 11     liraglutide (VICTOZA) 0.6 mg/0.1 mL (18 mg/3 mL) injection Inject 0.1 mL (0.6 mg total) under the skin daily. With or without food. Increase to 1.2 after after two weeks. 3 mL 0     magnesium oxide (MAGOX) 400 mg (241.3 mg magnesium) tablet Take 1 tablet (400 mg total) by mouth daily. 200 tablet 1     No current facility-administered medications for this visit. " "      Objective:   Vital Signs:   Visit Vitals  /56   Pulse 84   Ht 5' 6.54\" (1.69 m)   Wt 210 lb (95.3 kg)   SpO2 97%   BMI 33.35 kg/m        VisionScreening:  No exam data present     PHYSICAL EXAM  GENERAL:  Patient alert, in no acute distress.  EYES: PERRLA. Extraoccular movements intact, pupils equal, reactive to light and accommodation.  Normal conjunctiva and lids.   ENT:  Hearing grossly normal.  Normal appearance to ears and nose.  Bilateral TM s, external canals, oropharynx normal. Normal lips, gums and teeth.  Normal nasal mucosa, septum and turbinates.  NECK:  Supple, without thyromegaly or mass.  RESP:  Clear to auscultation without crackles, wheezes or distress.  Normal respiratory effort.   CV:  Regular rate and rhythm without murmurs, rubs or gallops.  Normal carotid, abdominal aorta, femoral and pedal pulses.  No varicosities or edema.  ABDOMEN:  Soft, non-tender, without hepatosplenomegaly, masses, or hernias.   LYMPHATIC: Normal palpation of neck, groin and axilla..  No lymphadenopathy.  No bruising.  NEURO:  CN II-XII intact, motor & sensory function all intact.  DTR and reflexes normal.  PSYCHIATRIC:  Alert & oriented with normal mood and affect.  Good judgment and insight.  SKIN:  Normal inspection and palpation.  MUSCULOSKELETAL: Normal gait and station.  - Spine / Ribs / Pelvis: Normal inspection, ROM, stability and strength: Spine, Head, Neck, Upper and Lower Extremities.  Diabetic foot: No lesions noted on the feet.  No drainage between the toes.  Normal monofilament sensation      Assessment Results 1/4/2021   Activities of Daily Living No help needed   Instrumental Activities of Daily Living No help needed   Get Up and Go Score Less than 12 seconds   Mini Cog Total Score 5   Some recent data might be hidden     A Mini-Cog score of 0-2 suggests the possibility of dementia, score of 3-5 suggests no dementia    Identified Health Risks:     "

## 2021-06-14 NOTE — TELEPHONE ENCOUNTER
Prior Authorization Request  Who s requesting:  Pharmacy  Pharmacy Name and Location: [facility]  Medication Name: Victoza injection  Insurance Plan: Medicare  Insurance Member ID Number:    CoverMyMeds Key: N/A  Informed patient that prior authorizations can take up to 10 business days for response:   Yes  Okay to leave a detailed message: Yes

## 2021-06-14 NOTE — PATIENT INSTRUCTIONS - HE
Patient Education     Your Health Risk Assessment indicates you feel you are not in good physical health.    A healthy lifestyle helps keep the body fit and the mind alert. It helps protect you from disease, helps you fight disease, and helps prevent chronic disease (disease that doesn't go away) from getting worse. This is important as you get older and begin to notice twinges in muscles and joints and a decline in the strength and stamina you once took for granted. A healthy lifestyle includes good healthcare, good nutrition, weight control, recreation, and regular exercise. Avoid harmful substances and do what you can to keep safe. Another part of a healthy lifestyle is stay mentally active and socially involved.    Good healthcare     Have a wellness visit every year.     If you have new symptoms, let us know right away. Don't wait until the next checkup.     Take medicines exactly as prescribed and keep your medicines in a safe place. Tell us if your medicine causes problems.   Healthy diet and weight control     Eat 3 or 4 small, nutritious, low-fat, high-fiber meals a day. Include a variety of fruits, vegetables, and whole-grain foods.     Make sure you get enough calcium in your diet. Calcium, vitamin D, and exercise help prevent osteoporosis (bone thinning).     If you live alone, try eating with others when you can. That way you get a good meal and have company while you eat it.     Try to keep a healthy weight. If you eat more calories than your body uses for energy, it will be stored as fat and you will gain weight.     Recreation   Recreation is not limited to sports and team events. It includes any activity that provides relaxation, interest, enjoyment, and exercise. Recreation provides an outlet for physical, mental, and social energy. It can give a sense of worth and achievement. It can help you stay healthy.       Patient Education   Personalized Prevention Plan  You are due for the preventive  services outlined below.  Your care team is available to assist you in scheduling these services.  If you have already completed any of these items, please share that information with your care team to update in your medical record.  Health Maintenance   Topic Date Due     HEPATITIS C SCREENING  1940     DEXA SCAN  1940     ZOSTER VACCINES (1 of 2) 07/25/1992     DIABETIC FOOT EXAM  07/09/2019     MAMMOGRAM  04/19/2020     ADVANCE CARE PLANNING  07/27/2020     DIABETIC EYE EXAM  03/04/2021     MICROALBUMIN  03/11/2021     A1C  06/07/2021     BMP  12/07/2021     LIPID  12/07/2021     MEDICARE ANNUAL WELLNESS VISIT  01/04/2022     FALL RISK ASSESSMENT  01/04/2022     TD 18+ HE  07/20/2025     COLORECTAL CANCER SCREENING  09/02/2025     Pneumococcal Vaccine: 65+ Years  Completed     INFLUENZA VACCINE RULE BASED  Completed     Pneumococcal Vaccine: Pediatrics (0 to 5 Years) and At-Risk Patients (6 to 64 Years)  Aged Out

## 2021-06-14 NOTE — TELEPHONE ENCOUNTER
Reason for Call:  Medication or medication refill:    Do you use a [facility]?  Name of the pharmacy and phone number for the current request: [facility] [phone]    Name of the medication requested: lisinopril-hydrochlorothiazide (PRINZIDE,ZESTORETIC) 10-12.5 mg per tablet    Other request: She is requesting a 3 month supply. Does not have anymore refills    Can we leave a detailed message on this number? Yes    Phone number patient can be reached at: Home number on file [phone] (home)    Best Time: anytime    Call taken on 1/21/2021 at 10:33 AM by Ena Burgos

## 2021-06-14 NOTE — TELEPHONE ENCOUNTER
Reason for Call:  Medication or medication refill:    Do you use a [facility]?  Name of the pharmacy and phone number for the current request: [facility] on [address]    Name of the medication requested:     Pt is needing the needles to go with  This med:   liraglutide (VICTOZA) 0.6 mg/0.1 mL (18 mg/3 mL) injection 3 mL 0 1/4/2021  --   Sig - Route: Inject 0.1 mL (0.6 mg total) under the skin daily. With or without food. Increase to 1.2 after after two weeks. - Subcutaneous         Other request:na     Can we leave a detailed message on this number? Yes    Phone number patient can be reached at: Home number on file [phone] (home)    Best Time: any    Call taken on 1/11/2021 at 8:53 AM by Pamela Behr

## 2021-06-14 NOTE — TELEPHONE ENCOUNTER
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    Date needed: as soon as possible    Has the patient been seen by the PCP for this problem? YES    Additional comments: getting in the mail and wants to get her education scheduled at [facility]    Phone number Patient can be reached at:  Home number on file [phone] (home)    Best Time:  any    Can we leave a detailed message on this number?  Yes    Call taken on 1/25/2021 at 9:29 AM by Pamela Behr

## 2021-06-14 NOTE — TELEPHONE ENCOUNTER
Refill Approved    Rx renewed per Medication Renewal Policy. Medication was last renewed on 2/10/20.    Bree Walton, [facility] Triage/Med Refill 1/22/2021     Requested Prescriptions   Pending Prescriptions Disp Refills     lisinopriL-hydrochlorothiazide (PRINZIDE,ZESTORETIC) 10-12.5 mg per tablet 90 tablet 3     Sig: TAKE 1 TABLET BY MOUTH BEFORE BREAKFAST       Diuretics/Combination Diuretics Refill Protocol  Passed - 1/21/2021 11:10 AM        Passed - Visit with PCP or prescribing provider visit in past 12 months     Last office visit with prescriber/PCP: 12/11/2020 Harmony Ireland DO OR same dept: Visit date not found OR same specialty: 12/11/2020 Harmony Ireland DO  Last physical: 1/4/2021 Last MTM visit: Visit date not found   Next visit within 3 mo: Visit date not found  Next physical within 3 mo: Visit date not found  Prescriber OR PCP: Harmony Ireland DO  Last diagnosis associated with med order: 1. Essential hypertension  - lisinopriL-hydrochlorothiazide (PRINZIDE,ZESTORETIC) 10-12.5 mg per tablet; TAKE 1 TABLET BY MOUTH BEFORE BREAKFAST  Dispense: 90 tablet; Refill: 3    If protocol passes may refill for 12 months if within 3 months of last provider visit (or a total of 15 months).             Passed - Serum Potassium in past 12 months      Lab Results   Component Value Date    Potassium 4.4 12/07/2020             Passed - Serum Sodium in past 12 months      Lab Results   Component Value Date    Sodium 141 12/07/2020             Passed - Blood pressure on file in past 12 months     BP Readings from Last 1 Encounters:   01/12/21 128/67             Passed - Serum Creatinine in past 12 months      Creatinine   Date Value Ref Range Status   12/07/2020 1.07 0.60 - 1.10 mg/dL Final

## 2021-06-14 NOTE — TELEPHONE ENCOUNTER
Central PA team  [phone]  Pool: HE PA MED ([id number])          PA has been initiated.       PA form completed and faxed insurance via Cover My Meds     Gay:  ARMANI YANEZ (Key: [id number])      Medication:  FreeStyle Liane 14 Day [place] device     Insurance:  UCARE/TAYLA        Response will be received via fax and may take up to 5-10 business days depending on plan

## 2021-06-14 NOTE — TELEPHONE ENCOUNTER
Medication Name: Pen needles to go with Victoza, one injection daily for 30 days  Pharmacy Name and Location: [facility]  Reason for request: Victozin is new medication for her  When did you last use medication last? Has not started it yet.  Okay to leave detailed message: Yes

## 2021-06-15 NOTE — TELEPHONE ENCOUNTER
Refill Approved    Rx renewed per Medication Renewal Policy. Medication was last renewed on 1/4/2021.    Radha Fox, [facility] [place] [facility] Triage/Med Refill 2/5/2021     Requested Prescriptions   Pending Prescriptions Disp Refills     VICTOZA 2-KARLA 0.6 mg/0.1 mL (18 mg/3 mL) injection [Pharmacy Med Name: VICTOZA 18MG/3ML INJ PEN 3ML(2PACK)] 6 mL 0     Sig: INJECT 0.6MG UNDER THE SKIN DAILY FOR 2 WEEKS,THEN INCREASE TO 1.2MG UNDER THE SKIN DAILY       Insulin/GLP-1 Refill Protocol Passed - 2/5/2021  3:12 AM        Passed - Visit with PCP or prescribing provider visit in last 6 months     Last office visit with prescriber/PCP: 12/11/2020 OR same dept: 12/11/2020 Harmony Ireland DO OR same specialty: 12/11/2020 Harmony Ireland DO Last physical: 1/4/2021 Last MTM visit: Visit date not found     Next appt within 3 mo: Visit date not found  Next physical within 3 mo: Visit date not found  Prescriber OR PCP: Harmony Ireland DO  Last diagnosis associated with med order: 1. Type 2 diabetes mellitus with hyperglycemia, with long-term current use of insulin (H)  - VICTOZA 2-KARLA 0.6 mg/0.1 mL (18 mg/3 mL) injection [Pharmacy Med Name: VICTOZA 18MG/3ML INJ PEN 3ML(2PACK)]; INJECT 0.6MG UNDER THE SKIN DAILY FOR 2 WEEKS,THEN INCREASE TO 1.2MG UNDER THE SKIN DAILY  Dispense: 6 mL; Refill: 0    If protocol passes may refill for 6 months if within 3 months of last provider visit (or a total of 9 months).              Passed - A1C in last 6 months     Hemoglobin A1c   Date Value Ref Range Status   12/07/2020 7.2 (H) <=5.6 % Final               Passed - Microalbumin in last year     Microalbumin, Random Urine   Date Value Ref Range Status   03/11/2020 1.04 0.00 - 1.99 mg/dL Final                  Passed - Blood pressure in last year     BP Readings from Last 1 Encounters:   01/12/21 128/67             Passed - Creatinine done in last year     Creatinine   Date Value Ref Range Status   12/07/2020 1.07 0.60 -  1.10 mg/dL Final

## 2021-06-15 NOTE — PROGRESS NOTES
"SUBJECTIVE: Kelley Styles is a 77 y.o. female with:   Chief Complaint   Patient presents with     Diabetes    1) She feels her A1C is up and she has not lost weight.  She has been using Tresiba 65 units daily with metformin.  The metformin has been giving her diarrhea so she has not been using it.  She would like to go back to Lantus. She does not think the Tresiba has helped her diabetes mellitus. She has not been as good with her diet as in the past.    2) She needs help with getting her toenails cut.  Her hands are stiff and she has problems with using the toenail clippers.    3) She has a lot of overall joint pain. She has been taking Aleve occasionally and wonders if she could take it on a regular basis.    OBJECTIVE: /66 (Patient Site: Left Arm, Patient Position: Sitting, Cuff Size: Adult Large)  Pulse 79  Resp 13  Ht 5' 6.73\" (1.695 m)  Wt 214 lb (97.1 kg)  SpO2 97%  BMI 33.79 kg/m2 no distress       Recent Results (from the past 240 hour(s))   Glycosylated Hemoglobin A1c   Result Value Ref Range    Hemoglobin A1c 7.4 (H) 3.5 - 6.0 %     Kelley was seen today for diabetes.    Diagnoses and all orders for this visit:    Diabetes type 2, controlled - Fair control.  Will switch to Lantus at her request.  Recheck in 3 months.  She will f/u with podiatry to cut her nails.  -     Glycosylated Hemoglobin A1c  -     LANTUS 100 unit/mL injection; INJECT 65 UNITS SUBCUTANEOUSLY AT BEDTIME    Joint pain - I recommend she try nutraceutical with tumeric.    Patient Instructions   Consider doing some weight training twice a week to build muscle mass.    Schedule visit with Crystal    To order supplements go to the web site: [employer]  Use my authorization number to order the recommended supplements: [email or web address]     InflaThera - Take 1 twice daily    Reyna Hardin"

## 2021-06-16 PROBLEM — R43.0 LOSS, SENSE OF, SMELL: Status: ACTIVE | Noted: 2020-12-11

## 2021-06-16 PROBLEM — K57.32 DIVERTICULITIS OF COLON: Status: ACTIVE | Noted: 2020-12-11

## 2021-06-16 PROBLEM — N18.30 CHRONIC KIDNEY DISEASE, STAGE 3 (H): Status: ACTIVE | Noted: 2020-12-11

## 2021-06-17 NOTE — PATIENT INSTRUCTIONS - HE
1. Eat 3 balanced meals each day - Monitor carb intake and limit to 45- 60 grams per meal  This would be equal to 4-5 choices ~  1 choice = 15 grams    Do not wait longer than 4-5 hours to eat something  Snacks limit to no more than 30 grams of carbohydrates or 2 choices  Make sure you include protein source with each meal and at bedtime - this has been shown to help with blood glucose elevations    2. Check blood sugars at least 3  times each day - you must scan at least once every 8 hours or sensor will go to sleep    Fasting and before meal target is 80 - 130   2 hours after a meal target is < 180  remember to bring meter and log book to all appointments    3. Incorporate 30 minutes activity into each day - does not need to be all at one time & walking counts    4. Take diabetes medications as prescribed - Continue Lantus 90 units daily and 75 units before meals     [email or web address]    Call customer service for a new sensor [phone]

## 2021-06-17 NOTE — PROGRESS NOTES
Subjective findings: The patient presented to the clinic today complaining of long painful nails both feet.  The patient stated she is unable to treat her nails.    Objective findings: Nails bilateral feet are elongated and 2 times normal thickness.  Skin bilaterally warm and intact.  DP and PT pulses +1/4 bilateral feet.  Capillary refill less than 2 seconds bilateral feet.  Negative clonus, negative Babinski bilateral feet.  Range of motion within normal limits bilateral feet.  Muscle power +5/5 bilaterally in all compartments.    Assessment: Onychomycosis, onychauxis, diabetes mellitus  Plan: Trimmed dystrophic nails 1 through 5 both feet today.  I recommended the patient return to the clinic every 3 months for continued diabetic foot care

## 2021-06-17 NOTE — TELEPHONE ENCOUNTER
Reason for Call:  Other call back      Detailed comments: Pt calling asking if the FreeStyle Liane Sensor were refilled and sent to [facility]    Please Advise - Pt sent request a week ago, wondering status    Phone Number Patient can be reached at: Home number on file [phone] (home)    Best Time: anytime    Can we leave a detailed message on this number?: Yes    Call taken on 5/5/2021 at 11:14 AM by Anu Muir

## 2021-06-17 NOTE — PROGRESS NOTES
"OFFICE VISIT - FAMILY MEDICINE     ASSESSMENT AND PLAN     1. SOB (shortness of breath)  PFT Complete   Differential diagnosis of shortness of breath discussed with the patient, she is scheduled to see cardiologist, she had a negative stress abdomen a few years ago.  She did not respond to albuterol nebulizer given in the ER, she is also stating that she has a negative sleep study done about 8 months ago, we will set her up for a pulmonary function test.  She will return here or go to the ER if her symptoms get worse.  We've  discussed about symptomatic management of her lateral epicondylitis and shoulder tendinitis, she will return or follow-up if not improving.  CHIEF COMPLAINT   Follow-up (sob); Elbow Pain (left ); and Shoulder Pain (left)    HPI   Kelley Styles is a 77 y.o. female.  No Patient Care Coordination Note on file.    She was seen at [facility] a few days ago with shortness of breath, chest x-ray, EKG and cardiac enzymes were all negative, she was given a nebulizer treatment in the ER, she stating that did not help her shortness of breath.  She is scheduled to see a cardiologist in order to get an echocardiogram.  She denies any chest pain, denies any heartburn symptoms.  She describes her shortness of breath as a difficulty completing her expiration, with him she takes a deep breath, she stopped abruptly and having a hard time breathing.  She denies any fever chills.  She is able to complete her activities of daily living, no swelling in her leg.  Has been complaining of elbow pain but she stating that she was shoveling a few weeks ago, the pain is described as throbbing, localized on the lateral aspect of the left elbow and the shoulder area, no fall or trauma.  ER notes were reviewed.    Review of Systems As per HPI, otherwise negative.    OBJECTIVE   /52 (Patient Site: Left Arm, Patient Position: Sitting, Cuff Size: Adult Large)  Pulse 76  Temp 99.3  F (37.4  C) (Oral)   Ht 5' 6\" (1.676 " m)  Wt 215 lb (97.5 kg)  SpO2 98%  BMI 34.7 kg/m2  Physical Exam   Constitutional: She is oriented to person, place, and time. She appears well-developed and well-nourished.   HENT:   Head: Normocephalic and atraumatic.   Neck: Normal range of motion. Neck supple. No JVD present. No tracheal deviation present. No thyromegaly present.   Cardiovascular: Normal rate, regular rhythm, normal heart sounds and intact distal pulses.  Exam reveals no gallop and no friction rub.    No murmur heard.  Pulmonary/Chest: Effort normal and breath sounds normal. No respiratory distress. She has no wheezes. She has no rales.   Musculoskeletal: She exhibits no edema or tenderness.   Lymphadenopathy:     She has no cervical adenopathy.   Neurological: She is alert and oriented to person, place, and time. Coordination normal.   Psychiatric: She has a normal mood and affect. Judgment and thought content normal.       PFSH     Family History   Problem Relation Age of Onset     Dementia Mother      Stroke Mother      Heart disease Mother      Heart disease Father      Stroke Father      Other Maternal Grandmother      STROKE SYNDROM     No Medical Problems Maternal Grandfather      Heart disease Paternal Grandmother      Heart disease Paternal Grandfather      Breast cancer Paternal Aunt      late 70's     Obesity Paternal Aunt      No Medical Problems Brother      BRCA 1/2 Neg Hx      Cancer Neg Hx      Colon cancer Neg Hx      Endometrial cancer Neg Hx      Ovarian cancer Neg Hx      Social History     Social History     Marital status:      Spouse name: N/A     Number of children: N/A     Years of education: N/A     Occupational History     Not on file.     Social History Main Topics     Smoking status: Never Smoker     Smokeless tobacco: Never Used      Comment: pt states that 50yrs+, smoked for x 1 year only     Alcohol use No      Comment: rare wine out for lunch     Drug use: No     Sexual activity: No     Other Topics  Concern     Not on file     Social History Narrative    ,     Lives alone with a cat    Retired  for [employer]    No children     Relevant history was reviewed with the patient today, unless noted in HPI, nothing is pertinent for this visit.  [facility]     Patient Active Problem List    Diagnosis Date Noted     GERD (gastroesophageal reflux disease)      Hypertension      Cataract      Glaucoma      Macular degeneration      Morbid obesity      Tubular adenoma of colon 09/10/2015     Overview Note:     Colonoscopy 9/15- due again in 2020       Hypercholesterolemia 07/27/2015     Chronic low back pain 07/20/2015     Osteoarthritis of both hands 07/20/2015     Trigger Finger (Acquired)      Overview Note:     Created by Conversion    Replacement Utility updated for latest IMO load       Rosacea      Overview Note:     Created by Conversion         Vitamin D Deficiency      Overview Note:     Created by Conversion    Replacement Utility updated for latest IMO load       Nontoxic Solitary Thyroid Nodule      Overview Note:     Created by Conversion         Esophageal Reflux      Overview Note:     Created by Conversion         Excessive Sweating      Overview Note:     Created by Conversion         Osteopenia      Overview Note:     Created by Conversion    Replacement Utility updated for latest IMO load       Diabetes type 2, controlled      Overview Note:     Created by Conversion         Carpal Tunnel Syndrome      Overview Note:     Created by Conversion         Past Surgical History:   Procedure Laterality Date     APPENDECTOMY       CARPAL TUNNEL RELEASE Left      CATARACT EXTRACTION Left 01/2017     KNEE ARTHROSCOPY       DC TOTAL HIP ARTHROPLASTY      Description: Total Hip Replacement;  Recorded: 03/29/2012;     thyroid biopsy       TONSILLECTOMY       TRIGGER FINGER RELEASE Left        RESULTS/CONSULTS (Lab/Rad)   No results found for this or any previous visit (from the past 168  "hour(s)).  No results found.  MEDICATIONS     Current Outpatient Prescriptions on File Prior to Visit   Medication Sig Dispense Refill     aspirin 325 MG tablet 650 mg every 6 (six) hours as needed. Half of 325mg every other day        atorvastatin (LIPITOR) 40 MG tablet TAKE ONE TABLET BY MOUTH ONCE DAILY AT BEDTIME 90 tablet 2     blood glucose test (ONETOUCH ULTRA TEST) strips Use 1 each As Directed 4 (four) times a day. Dispense brand per patient's insurance at pharmacy discretion. (E11.9) 200 strip 2     cholecalciferol, vitamin D3, (VITAMIN D3) 2,000 unit cap Take 1 capsule by mouth 2 (two) times a day.       lancets (ONETOUCH DELICA LANCETS) 33 gauge Misc Use to check blood sugars four times daily 100 each 11     LANTUS 100 unit/mL injection INJECT 65 UNITS SUBCUTANEOUSLY AT BEDTIME 10 mL 3     LANTUS U-100 INSULIN 100 unit/mL injection INJECT 60 UNITS SUBCUTANEOUSLY AT BEDTIME 50 mL 2     latanoprost (XALATAN) 0.005 % ophthalmic solution Administer 1 drop to both eyes at bedtime.       lisinopril-hydrochlorothiazide (PRINZIDE,ZESTORETIC) 10-12.5 mg per tablet Take 1 tablet by mouth daily before breakfast. 90 tablet 3     multivitamin with minerals (THERA-M) 9 mg iron-400 mcg Tab tablet Take 1 tablet by mouth daily.       NOVOLOG U-100 INSULIN ASPART 100 unit/mL injection USE AS DIRECTED UP  UNITS A DAY AS NEEDED 180 mL 0     pen needle, diabetic (BD ULTRA-FINE WALTER PEN NEEDLES) 32 gauge x 5/32\" Ndle Use to inject Tresiba once daily 100 each 3     insulin degludec (TRESIBA FLEXTOUCH U-100) 100 unit/mL (3 mL) InPn Inject 60 Units under the skin at bedtime. 30 Syringe 1     No current facility-administered medications on file prior to visit.        HEALTH MAINTENANCE / SCREENING   PHQ-2 Total Score: 0 (4/24/2018 11:23 AM), No Data Recorded,No Data Recorded  Immunization History   Administered Date(s) Administered     DT (pediatric) 03/09/2004     Hep B, Adult 07/12/2007, 08/16/2007, 12/21/2007     Hep B, " historic 07/12/2007, 08/16/2007, 12/21/2007     Influenza U0y0-20, 01/04/2010     Influenza high dose, seasonal 09/23/2011, 09/13/2015, 09/21/2016, 09/14/2017     Influenza, inj, historic,unspecified 12/16/1996, 10/15/1998, 10/12/2004, 10/14/2005, 10/28/2006, 08/16/2009, 09/27/2010, 11/06/2012, 09/13/2015, 09/01/2016, 09/15/2017     Influenza,seasonal, Inj IIV3 10/14/2005, 10/28/2006, 08/16/2009, 09/16/2009, 09/27/2010, 11/06/2012     Pneumo Conj 13-V (2010&after) 07/20/2015     Pneumo Polysac 23-V 10/15/1998, 01/01/2002, 10/26/2007     Td, Adult, Absorbed 03/09/2004     Td,adult,historic,unspecified 03/09/2004     Tdap 07/20/2015     Health Maintenance   Topic     DIABETES FOOT EXAM      DIABETES OPHTHALMOLOGY EXAM      ZOSTER VACCINE      DXA SCAN      DIABETES FOLLOW-UP      DIABETES HEMOGLOBIN A1C      DIABETES URINE MICROALBUMIN      FALL RISK ASSESSMENT      MAMMOGRAM      ADVANCE DIRECTIVES DISCUSSED WITH PATIENT      COLONOSCOPY      TD 18+ HE      PNEUMOCOCCAL POLYSACCHARIDE VACCINE AGE 65 AND OVER      INFLUENZA VACCINE RULE BASED      PNEUMOCOCCAL CONJUGATE VACCINE FOR ADULTS (PCV13 OR PREVNAR)        Boyd Ayala MD  Family Medicine, [facility]     This note was dictated using a voice recognition software.  Any grammatical or context distortion are unintentional and inherent to the software.

## 2021-06-17 NOTE — TELEPHONE ENCOUNTER
Telephone Encounter by Aleena Dinh at 1/7/2021  8:23 AM     Author: Aleena Dinh Service: -- Author Type: --    Filed: 1/7/2021  8:26 AM Encounter Date: 1/4/2021 Status: Signed    : Aleena Dinh APPROVED:    Approval start date: 12/06/2020  Approval end date:  01/05/2024    Pharmacy has been notified of approval and will contact patient when medication is ready for pickup.

## 2021-06-17 NOTE — PROGRESS NOTES
Cpft with pre and post spirometry done using albuterol 2.5 mg neb.  Ats standards met, patient steven well, results scanned to patients chart

## 2021-06-17 NOTE — PATIENT INSTRUCTIONS - HE
1. Eat 3 balanced meals each day - Monitor carb intake and limit to 45-60 grams per meal  This would be equal to 3-4 choices ~  1 choice = 15 grams    Do not wait longer than 4-5 hours to eat something  Snacks limit to no more than 30 grams of carbohydrates or 2 choices  Make sure you include protein source with each meal and at bedtime - this has been shown to help with blood glucose elevations    2. Scan and check blood sugars at least 3  times each day - you must scan at least once every 8 hours or sensor will go to sleep  When you wake up and before meals   Fasting and before meal target is 80 - 130   2 hours after a meal target is < 180  remember to bring meter and log book to all appointments    3. Incorporate 30 minutes activity into each day - does not need to be all at one time & walking counts    4. Take diabetes medications as prescribed continue your meds with no changes at this time - please write down how much insulin you are giving yourself at each injection and try to keep a food log of what you are eating and bring in your syringes next Monday

## 2021-06-17 NOTE — PATIENT INSTRUCTIONS - HE
"Patient Instructions by Luís Young PT at 7/19/2019  1:30 PM     Author: Luís Young PT Service: -- Author Type: Physical Therapist    Filed: 7/19/2019  1:57 PM Encounter Date: 7/19/2019 Status: Signed    : Luís Young PT (Physical Therapist)        BRIDGING    While lying on your back, tighten your lower abdominals, squeeze your buttocks and then raise your buttocks off the floor/bed as creating a \"Bridge\" with your body.    *do this with your feet/toes flexed up.    Hold. 3-5 seconds. 15x.  2x/day.      CLAM SHELLS    While lying on your side with your knees bent, draw up the top knee while keeping contact of your feet together.    Do not let yourself roll backwards!    Do 15x each leg.  2x/day.            "

## 2021-06-17 NOTE — TELEPHONE ENCOUNTER
Spoke with patient regarding prescription being sent to [facility].  She said she had call them to have the sensors refilled there and they informed her that her provider would need to call.  I informed her that the prescription had already been sent to the [place] mail order pharmacy and that they would be refilling her sensors for her sending them straight to her doorstep.  She did not know this and thought they had to be filled by [facility] to get delivered to her home.  Kelley said she is fine with keeping the prescription with [place] pharmacy as long as they are delivered to her home.

## 2021-06-17 NOTE — TELEPHONE ENCOUNTER
Telephone Encounter by Sami Patel at 1/20/2021 12:06 PM     Author: Sami Patel Service: -- Author Type: Patient Access    Filed: 1/22/2021 12:16 PM Encounter Date: 1/8/2021 Status: Addendum    : Sami Patel (Patient Access)    Related Notes: Original Note by Sami Patel (Patient Access) filed at 1/20/2021 12:15 PM       PA APPROVED:    Approval start date: 12/21/20  Approval end date:  01/20/24    Pharmacy has been notified of approval and will contact patient when medication is ready for pickup.     I called [facility] and spoke with AVANI Franklin and she did state this does require a PA; answered the clinical questions over the phone with the rep.  Obtained verbal approval over the phone.

## 2021-06-17 NOTE — TELEPHONE ENCOUNTER
Telephone Encounter by Bryan Salazar at 1/8/2021 11:31 AM     Author: Bryan Salazar Service: -- Author Type: --    Filed: 1/8/2021 11:32 AM Encounter Date: 1/8/2021 Status: Signed    : Bryan Salazar

## 2021-06-17 NOTE — TELEPHONE ENCOUNTER
Telephone Encounter by Sami Patel at 1/20/2021 12:00 PM     Author: Sami Patel Service: -- Author Type: Patient Access    Filed: 1/22/2021 12:17 PM Encounter Date: 1/8/2021 Status: Addendum    : Sami Patel (Patient Access)    Related Notes: Original Note by Sami Patel (Patient Access) filed at 1/20/2021 12:16 PM       PA APPROVED:    Approval start date: 12/21/20  Approval end date:  01/20/24    Pharmacy has been notified of approval and will contact patient when medication is ready for pickup.     called [facility] and spoke with PA Rep Franklin and she did state this does require a PA; answered the clinical questions over the phone with the rep.  Obtained verbal approval over the phone

## 2021-06-17 NOTE — PATIENT INSTRUCTIONS - HE
1. Eat 5 balanced mini meals each day - make sure you are getting both protein and carbs in each meal     Do not wait longer than 4-5 hours to eat something  Snacks limit to no more than 30 grams of carbohydrates or 2 choices  Make sure you include protein source with each meal and at bedtime - this has been shown to help with blood glucose elevations    2. Check blood sugars several  times each day - you are doing EXCELLENT with this   Fasting and before meal target is 80 - 130   2 hours after a meal target is < 180  remember to bring meter and log book to all appointments    3. Incorporate 30 minutes activity into each day - does not need to be all at one time & walking counts    4. Take diabetes medications as prescribed Lantus 3/4 syringe - 75 units, keep dosing the Novolog as you have been     WOOO HOOO KEEP THIS UP - YOU ARE DOING SOOOOOOOOOOOO GOOD !!!

## 2021-06-17 NOTE — TELEPHONE ENCOUNTER
Telephone Encounter by Bryan Salazar at 1/8/2021 11:32 AM     Author: Bryan Salazar Service: -- Author Type: --    Filed: 1/8/2021 11:33 AM Encounter Date: 1/8/2021 Status: Signed    : Bryan Salazar

## 2021-06-17 NOTE — TELEPHONE ENCOUNTER
Refill Approved    Rx renewed per Medication Renewal Policy. Medication was last renewed on 8/3/20.    Owen Segovia, [facility] Connection Triage/Med Refill 4/29/2021     Requested Prescriptions   Pending Prescriptions Disp Refills     atorvastatin (LIPITOR) 40 MG tablet [Pharmacy Med Name: ATORVASTATIN TABS 40MG] 90 tablet 3     Sig: TAKE 1 TABLET AT BEDTIME       Statins Refill Protocol (Hmg CoA Reductase Inhibitors) Passed - 4/28/2021  9:06 AM        Passed - PCP or prescribing provider visit in past 12 months      Last office visit with prescriber/PCP: 3/11/2020 Reyna Hardin MD OR same dept: Visit date not found OR same specialty: 12/11/2020 Harmony Ireland DO  Last physical: 10/3/2017 Last MTM visit: Visit date not found   Next visit within 3 mo: Visit date not found  Next physical within 3 mo: Visit date not found  Prescriber OR PCP: Reyna Hardin MD  Last diagnosis associated with med order: 1. Hypercholesterolemia  - atorvastatin (LIPITOR) 40 MG tablet [Pharmacy Med Name: ATORVASTATIN TABS 40MG]; TAKE 1 TABLET AT BEDTIME  Dispense: 90 tablet; Refill: 3    If protocol passes may refill for 12 months if within 3 months of last provider visit (or a total of 15 months).

## 2021-06-18 NOTE — PATIENT INSTRUCTIONS - HE
Patient Instructions by Harmony Ireland DO at 12/11/2020 12:20 PM     Author: Harmony Ireland DO Service: -- Author Type: Physician    Filed: 12/11/2020  1:16 PM Encounter Date: 12/11/2020 Status: Addendum    : Harmony Ireland DO (Physician)    Related Notes: Original Note by Harmony Ireland DO (Physician) filed at 12/11/2020 12:53 PM       Patient Education     Understanding Diverticulosis and Diverticulitis     Pouches or diverticula usually occur in the lower part of the colon called the sigmoid.   The colon (large intestine) is the last part of the digestive tract. It absorbs water from stool and changes it from a liquid to a solid. In certain cases, small pouches called diverticula can form in the colon wall. This condition is called diverticulosis. It's very common as people get older. The pouches can become infected. If this happens, it becomes a more serious problem called diverticulitis. These problems can be painful. But they can be managed.  Managing your condition  Diet changes or medicines may be prescribed.   If you have diverticulosis  Recommendations include:    Diet changes are often enough to control symptoms. The main changes are adding fiber (roughage) and drinking more water. Fiber absorbs water as it travels through your colon. This helps your stool stay soft and move smoothly. Water helps this process.    If needed, you may be told to take over-the-counter stool softeners.    To help ease pain, antispasmodic medicines may be prescribed.    Watch for changes in your bowel movements. Tell your healthcare provider if you notice any changes.    Begin an exercise program. Ask your healthcare provider how to get started.    Get plenty of rest and sleep.   If you have diverticulitis  Treatment depends on how bad your symptoms are.    For mild symptoms. You may be put on a liquid diet for a short time. Antibiotics are usually prescribed. If these 2 steps relieve  your symptoms, you may then be prescribed a high-fiber diet. If you still have symptoms, your healthcare provider will discuss more treatment choices with you.    For severe symptoms. You may need to be admitted to the hospital. There, you can be given IV antibiotics and fluids. You will also be put on a low-fiber or liquid diet. Although not common, surgery is needed in some people with severe symptoms.  Marueno to colon health     Diverticulitis occurs when the pouches become infected or inflamed.     Help keep your colon healthy with a diet that includes plenty of high-fiber fruits, vegetables, and whole grains. Drink plenty of liquids like water and juice. Maintain a healthy lifestyle, including regular exercise, stress management, and adequate rest and sleep.   Date Last Reviewed: 7/1/2016 2000-2019 [employer]. [address] [place] [address], [place] [address]. All rights reserved. This information is not intended as a substitute for professional medical care. Always follow your healthcare professional's instructions.               Take Lantus nightly and novolog while eating meals    Take blood sugar every morning and before meals      Pelvic exam at next visit     Return for Wellness Visit

## 2021-06-18 NOTE — PROGRESS NOTES
"MTM Follow Up Encounter  ASSESSMENT AND PLAN  Type 2 Diabetes:  poorly controlled. A1C not at goal of <7%, but based on her BG readings, I would expect her A1c to be higher. FBG not at goal  mg/dL. Labs are up to date.  Recommended restarting metformin and to increase by 500 mg per week -- decrease dose if she starts to experience diarrhea. She was agreeable to this plan. Reviewed that she likely needs an increase in her insulin dose, but she would like to make one change at a time. Could consider splitting Lantus to 33 units BID for more coverage. Will refer to DME for Freestyle Liane CGM and make insulin adjustments based on returning data.   PLAN:   1. Restart metformin  mg daily. Increase by 500 mg weekly as tolerated -- use lowest tolerated dose.   2. Referral to DME for FreeStyle Liane CGM.     MTM FOLLOW UP  3 months    SUBJECTIVE AND OBJECTIVE  Kelley Styles is a 77 y.o. female here for a follow-up medication therapy management (MTM) appointment.     Medication Concern(s)/Question(s): elevated blood sugars     Type 2 Diabetes: Currently taking Lantus vial 65 units HS and Novolog ~60 units TID before meals. Reports that 2000 mg of metformin XR gave her diarrhea, but the lower doses did not. Did not notice a difference in blood sugars with Tresiba and she did not like that it was \"over-packaged.\" Prefers vials but would be interested in Lantus pen in the future.   When her BG was 401 before bed, she gave herself 60 units Novolog and at 3am her BG was 235  Tests BG 4 times daily.  Blood sugars per glucometer:   Fasting AM = 209, 208, 223  ~11am = 333  ~3:30pm = 249  ~5pm = 351, 290, 229, 352, 128, 212, 131  ~7pm = 333, 346  Bedtime = 401, 100  3am (middle of the night when she goes to the bathroom) = 235, 194, 136, 246, 180, 184, 167, 176  Last A1c checked today = 7.7%, previously 7.4% on 2/8/18.   Hypoglycemia none  Using a One Touch meter. She is interested in doing a FreeStyle Liane CGM with " a DME and then would try the OTC Liane.   Microalbumin checked 10/3/17  Has been active in getting a condo ready for sale. When she excerse her BG will go up to the 200s. Admits that she is not eating well lately. Exercises with resistance bands.           Kelley's medication list was reviewed with them, discussing reason for use, directions for use, and potential side effects of each medication as needed. Indication, safety, efficacy, and convenience was assessed for all medications addressed above.  No environmental factors were noted currently affecting patient.  This care plan was communicated via EMR with her primary care provider, Reyna Hardin MD, who is the authorizing prescriber for this visit.  Direct supervision was available by either the patient's PCP or other available provider.    Time and complexity billing metrics are included in the docflowsheet linked to this visit    Time spent: 30 min  Crystal Tiwari, Pharm.D., BCACP   MTM Pharmacist at [facility] and [facility]

## 2021-06-18 NOTE — PROGRESS NOTES
"Assessment: Kelley has had type 2 diabetes since 1996.   She was recently restarted on  mg metformin and to increase as tolerated, but she hasn't started this yet.   She is currently taking 65 units Lantus insulin at bedtime and 60 units Novolog insulin with each meal.   She reports taking the Novolog insulin about 1.5 hours after eating- depending on BG value,  she may take more or less Novolog.  She admits \"it's guess work\".    She lives alone, likes to eat out.  \"I like sweets and breads\".  She does some exercise using a resistance band.  Occasionally reports hypoglycemia when she has taken too much Novolog insulin to correct a high blood sugar.  She prefers vial/syringe as it is less wasteful.   She has been working with Crystal Tiwari, Pharm D and is here today for the Freestyle Liane continous glucose sensor.      Plan: Placed Freestyle Liane glucose sensor on back side of left arm.   Requested that she keep detailed records of foods, exercise, and medication taken.    Rec that she take the Novolog meal time insulin 10 minutes prior to eating.  Discussed insulin action time and importance of timing of insulin.   Kelley was also interested in the continous glucose sensor for personal use.   Did contact [facility] pharmacy and her insurance will cover the Dexcom sensor, so this order was placed pending DR. Hardin's approval.     Reviewed nutrition guidelines and carbohydrate recommendations.   Appointment set up for next week to download sensor data and make insulin adjustment if necessary.     Subjective and Objective:      Kelley Styles is referred by Dr. Hardin for Diabetes Education.     Lab Results   Component Value Date    HGBA1C 7.7 (H) 05/16/2018         Goals       medication            Kelley will take Novolog insulin 10 minutes prior to eating        Plan meals (pt-stated)            Kelley will keep food records while wearing the Freestyle Liane glucose sensor      "       Follow up:   Diabetes classes for 1 week      Education:     Monitoring   Meter (per above goals):  did not bring today, but reports testing 4 times daily.  Some of the tests are 1.5 hours after eating.  Monitoring: assessment, discussed,       BG goals: assessment, discussed,  literature provided      Nutrition Management:  assessment, discussed, pt returned demo,  literature provided   Weight:assessment, discussed, pt returned demo,  literature provided   Portions/Balance: assessment, discussed, pt returned demo,  literature provided   Carb ID/Count: assessment, ,  literature provided   Label Reading: assessment, ,  literature provided   Heart Healthy Fats:assessment, literature provided   Menu Planning: assessment, discussed, ,  literature provided   Dining Out: assessment, discussed,  literature provided   Physical Activity: assessment, discussed,  literature provided -  resistance band  exercise reviewed   Medications: assessment, discussed  Orals: assessment, discussed -  encouraged her to restart metformin as directed  Injected Medications: Discussed   Storage/Exp:Discussed   Site Rotation: Discussed       Diabetes Disease Process: Discussed    Acute Complications: Prevent, Detect, Treat:  Hypoglycemia: Discussed and Literature provided  Hyperglycemia: Discussed and Literature provided  Sick Days: Literature provided    Chronic Complications  Foot Care: literature provided  Skin Care: Literature provided  Eye: Literature provided  -  pt reports having age related macular degeneration3  ABC: Literature provided  Teeth:Literature provided  Goal Setting and Problem Solving: Discussed and Literature provided  Barriers: Discussed and Literature provided  Psychosocial Adjustments: Discussed      Time spent with the patient: 60 minutes for diabetes education and counseling.   Previous Education: yes in 1996  Visit Type:MNT  Hours Remaining: DSMT 2 and MNT 1      Bijal Kearns  5/25/2018

## 2021-06-19 NOTE — PROGRESS NOTES
Assessment:    Kelley has had type 2 diabetes since 1996.   She was started on  mg metformin in May 2018.   She is currently taking 65 units Lantus insulin at bedtime and 60 units Novolog insulin with each meal.   She has started taking the Novolog insulin before eating as directed at previous visit in May.   A Freestyle Teressa sensor was placed at visit 5/25/18, but it was removed at the ER two days later when she had a fall and fractured her left wrist.  She has been recovering from the injury, appetite decrease, she has lost 10 lbs since the end of May 2018.   She is here today for the Dexcom continous glucose sensor training.     She reports her FBS have been:  140-50's mg/dl.  She denies symptoms of hypoglcyemia.      Plan:  Provided  training for the Dexcom continuous glucose sensor.  Reviewed basis of CGMS and not to overreact to the readings but look to understand the trends.  The sensor was placed on her abdomen, left side;  she was able to do the insertion with minimal assistance.  Sensor to be changed in one week.  Low alarm set at 80  mg/dl,  high alarm set at 250 mg/dl.  She is traveling for a week mid August.   She verbalizes her understanding of the Dexcom and communicated her intention of reading the materials to learn more.    Will contact billing for reversal of Freestlye Teressa sensor fee as it was not worn for at least 3 days.      Subjective and Objective:      Kelley Styles is referred by Dr. Hardin for Diabetes Education.     Lab Results   Component Value Date    HGBA1C 7.7 (H) 05/16/2018         Goals       calibrate            I will calibrate the Dexcom sensor every 12 hours.          medication            Kelley will take Novolog insulin 10 minutes prior to eating - complete        Plan meals (pt-stated)            Kelley will keep food records while wearing the Freestyle Teressa glucose sensor-- did not do, she had a fall, sensor was removed.            Follow up:   Diabetes classes  for 3 weeks to download Dexcom.      Education:     Monitoring   Meter (per above goals): assessment, discussed, pt returned demonstration,-  Dexcom,  literature review, pt returned demo  Monitoring: assessment, discussed, pt returned demonstration, literature provided   BG goals: assessment, discussed, pt returned demonstration, literature provided      Nutrition Management:  assessment, discussed, pt returned demo,  literature provided   Hypoglcyemia:  discussed,     Injected Medications: Discussed   Storage/Exp:Discussed   Site Rotation: Discussed       Diabetes Disease Process: Discussed - potential need for less medication if she continues to lose weight    Acute Complications: Prevent, Detect, Treat:  Hypoglycemia: Discussed, Literature provided and Patient returned demonstration  Hyperglycemia: Discussed  Sick Days: Discussed    Chronic Complications  Foot Care: literature provided  Goal Setting and Problem Solving: Assessed  Barriers: Discussed  Psychosocial Adjustments: Discussed      Time spent with the patient: 60 minutes for diabetes education and counseling.   Previous Education: yes  Visit Type:CHANEL Kearns  7/11/2018

## 2021-06-19 NOTE — PROGRESS NOTES
SUBJECTIVE: Kelley Styles is a 77 y.o. female with:  Chief Complaint   Patient presents with     Follow-up     Diabetes    1) shortness of breath - She had trouble breathing in May.  Was waking up with shortness of breath.  Was seen in the ER and discharged with negative work-up there.  She was referred for stress test and PFTs.   Her PFTs were normal.  She had normal Lexicon stress test with EF 70% and normal echo with mild diastolic dysfunction at [facility].  Her shortness of breath is better now.    2) She was outdoors and fractured her left wrist in May.  She wore a cast initially then splint.  The wrist has healed and is doing well now.    3) diabetes mellitus Type 2 - She is on insulin.  Has lost a few lbs.  Has not been eating as much.  She was using Ensure when could not make meals due to wrist fracture.  Blood sugars in the am are 130.    4) Macular degeneration - She has been going for injections every 3 months to eye specialist.    SH: Single.  Lives alone.  Going to [place] with her brother for [employer] to celebrate her grandfather's heritage.    OBJECTIVE: /52 (Patient Site: Right Arm, Patient Position: Sitting, Cuff Size: Adult Large)  Pulse 78  Wt 207 lb (93.9 kg)  SpO2 97%  BMI 33.41 kg/m2 no distress  Lungs: Clear to auscultation.  No retractions or tachypnea.  CV: RRR. S1 and S2 normal.  No murmurs, rubs or gallops.    Wt Readings from Last 3 Encounters:   07/09/18 207 lb (93.9 kg)   05/16/18 217 lb (98.4 kg)   04/24/18 215 lb (97.5 kg)       Lab Results   Component Value Date    HGBA1C 7.7 (H) 05/16/2018       Kelley was seen today for follow-up and diabetes.    Diagnoses and all orders for this visit:    Type II diabetes mellitus, uncontrolled (H)- Controlled but not optimal.  Continue with weight loss.  Recheck labs in August.  -     NOVOLOG U-100 INSULIN ASPART 100 unit/mL injection; USE AS DIRECTED UP  UNITS A DAY AS NEEDED  -     Comprehensive Metabolic Panel; Future  -      Lipid Cascade; Future  -     Glycosylated Hemoglobin A1c; Future  -     Microalbumin, Random Urine; Future  -     Magnesium; Future    SOB (shortness of breath)     Resolved after extensive work-up.  Call if symptoms reoccur.  Come in for annual wellness visit for next check.    Reyna Haridn

## 2021-06-19 NOTE — PROGRESS NOTES
Assessment:    Kelley has had type 2 diabetes since 1996.   She was started on  mg metformin in May 2018.   She is currently taking 65 units Lantus insulin at bedtime and 60 units Novolog insulin with each meal- she does vary this somewhat based on what she plans to eat.   She started the Dexcom sensor on 7/11/2018 and wore it for one week.  She did properly remove the sensor after 7 days, but had some problems removing the transmitter from the sensor.  She is here today to review this technique.  She is traveling to [place] next week and plans to wait until she returns from trip before putting new sensor on.  She doesn't want to have to change sensors when she's traveling.  Dexcom download for the week she wore it indicates average  mg/dl.  67% time in range, she was calibrating appropriately.   We were also able to get the Freestyle Liane sensor tracings from May.  Initially she thought she only wore for 1-2 days, but were able to get 5 days worth of data.    She is reporting now her FBS are between 140-180's consistently.  She denies symptoms of hypoglcyemia.        Plan:  Provided refresher  training for the Dexcom continuous glucose sensor; this included stopping sensor before starting a new one, reviewed technique to remove transmitter from sensor using the plastic sensor guard.  She was able to return this demo.  Marked pages in her training book  to use before placing new sensor, including stopping the previous sensor and insertion step by step guidel.   Low alarm set at 80  mg/dl,  high alarm set at 250 mg/dl.  She is traveling for a week mid August.   She verbalizes her understanding of the Dexcom and communicated her intention of reading the materials to learn more.    Following HE protocol, did advise her to increase her Lantus insulin to 70 units at bedtime.  She will return in 5 weeks, she plans to put on new sensor when she returns from trip and have a couple of weeks data for us to review.  She agreed to keep a food log for 3-5 days before next appt.   Spent some time discussing nutrition guidelines for traveling and diabetes.     Subjective and Objective:       Kelley Styles is referred by Dr. Hardin for Diabetes Education.            Lab Results   Component Value Date     HGBA1C 7.7 (H) 05/16/2018                     Goals                  calibrate                     I will calibrate the Dexcom sensor every 12 hours.         medication                     Kelley will take Novolog insulin 10 minutes prior to eating - complete       Plan meals (pt-stated)                     Kelley will keep food records while wearing the Freestyle Liane glucose sensor-- did not do, she had a fall, sensor was removed.             Follow up:   Diabetes classes for 3 weeks to download Dexcom.        Education:      Monitoring   Meter (per above goals): assessment, discussed, pt returned demonstration,-  Dexcom,  literature review-marked pages in training manual,   pt returned demo of transmitter/sensor removal  Monitoring: assessment, discussed, pt returned demonstration, literature provided   BG goals: assessment, discussed, pt returned demonstration, literature provided        Nutrition Management:  assessment, discussed, pt returned demo,  literature provided   Hypoglcyemia:  discussed,  while traveling, advised to take more snacks with her for the unpredicatable schedule.     Injected Medications: Discussed   Storage/Exp:Discussed   Site Rotation: Discussed         Diabetes Disease Process: Discussed - potential need for less medication if she continues to lose weight     Acute Complications: Prevent, Detect, Treat:  Hypoglycemia: Discussed, Literature provided and Patient returned demonstration  Hyperglycemia: Discussed  Sick Days: Discussed     Goal Setting and Problem Solving: Assessed  Barriers: Discussed  Psychosocial Adjustments: Discussed        Time spent with the patient: 30 minutes for diabetes  education and counseling.   Previous Education: yes  Visit Type:  MNT

## 2021-06-20 NOTE — PROGRESS NOTES
"SUBJECTIVE: Kelley Styles is a 78 y.o. female with   Chief Complaint   Patient presents with     Diabetes    1) diabetes mellitus type 2 - She is on insulin.  Continues to struggle with weight loss.  Blood sugar 102 this am.  She is trying Dexcom - continuous glucose monitor.  Seeing diabetes mellitus educator in [place].  She is thinking about a low carb/ high fat diet.  Has been using elastic band exercises for knees/ arms.    2) Macular degeneration - She is getting injections every 6 weeks.  The disease is stable.    3) Fractured left wrist - She has recovered from fracture done in May.  Followed by orthopedics.    4) HTN - blood pressure has been well controlled on lisinopril/ HCTZ.    5) Low back pain - [facility]  Last seen 2016.  Did not want to do PT.  Tried magnesium in past but did not help.  MRI of LS spine in 2015 showed no significant problems.  She will get periodic pain in low back when she bends over.  Has to stop.  Ok in house but bothers her when out.  Can only walk a block at a time.  Used walker on recent trip to [place].  Does not want to take muscle relaxant.      ROS: No shortness of breath / chest pain.  SH: Single. Lives alone.  She has a brother who she travels with- just got back from a [employer] festival in [place].  Patient Active Problem List   Diagnosis     Vitamin D Deficiency     Nontoxic Solitary Thyroid Nodule     Excessive Sweating     Osteopenia     Diabetes type 2, controlled (H)     Carpal Tunnel Syndrome     Trigger Finger (Acquired)     Rosacea     Chronic low back pain     Osteoarthritis of both hands     Hypercholesterolemia     Tubular adenoma of colon     Hypertension     Cataract     Glaucoma     Macular degeneration     Morbid obesity (H)     GERD (gastroesophageal reflux disease)         OBJECTIVE: /70  Pulse 82  Ht 5' 6\" (1.676 m)  Wt 205 lb (93 kg)  SpO2 96%  BMI 33.09 kg/m2   No acute distress.  HEENT: Head " atraumatic/normocephalic.  PERRL.  Subconjunctival hemorrhage left latera eye. Tympanic membranes grey with normal landmarks and normal light reflexes.  No nasal discharge.  Oropharynx is pink and moist.    Neck: Supple.  No lymphadenopathy or thyromegaly.  Lungs: Clear to auscultation.  No wheezing, retractions, or tachypnea.  Heart: RRR. S1 and S2 normal.  No murmurs, rubs, or gallops.  Neuro: Awake, alert, oriented x 3.  Normal strength and tone.  Normal gait.  Back: No spinal or para spinal pain.  Good flexion / extension.    Recent Results (from the past 240 hour(s))   Glycosylated Hemoglobin A1c   Result Value Ref Range    Hemoglobin A1c 7.5 (H) 3.5 - 6.0 %      Kelley was seen today for diabetes.    Diagnoses and all orders for this visit:    Type II diabetes mellitus, uncontrolled (H)- Fair control.  She is going to try high fat diet for 3 months then will check A1C.  -     Comprehensive Metabolic Panel  -     Lipid Cascade  -     Glycosylated Hemoglobin A1c  -     Magnesium  -     Glycosylated Hemoglobin A1c; Future    Visit for screening mammogram  -     Mammo Screening Bilateral; Future    Chronic bilateral low back pain without sciatica - She declines PT.  Will consider chiropractic care - will refer to Erna Rivera.    Essential hypertension- Well controlled.    Flu vaccine need  -     Influenza High Dose, Seasonal 65+ yrs       Reyna Hardin

## 2021-06-20 NOTE — PROGRESS NOTES
Assessment:    Kelley has had type 2 diabetes since 1996.   She is currently taking 70 units Lantus insulin at bedtime and 60 units Novolog insulin with each meal- she does vary this somewhat based on what she plans to eat.   She started the Dexcom sensor on 7/11/2018 and wore it for one week.  She recently returned from [facility] to [place] and is interested in wearing the Dexcom sensor again.  She is reporting now her FBS are between 140-180's consistently.  She denies symptoms of hypoglcyemia.   She does not have any more supplies for Dexcom, she has one sensor left and has transmitter from initial shipment in May, 2018 - the transmitter typically lasts for 3 months.  She is inquiring today about using GLP-1.   She did use Byetta in the past and it was beneficial for awhile.        Plan:  Provided refresher  training for the Dexcom continuous glucose sensor; this included calibration, stopping sensor before starting a new one, reviewed technique to remove transmitter from sensor using the plastic sensor guard.  A new sensor was placed today.  I did send email to Dexcom to inquire about her supplies - I will contact her wthen I get response.  Her  was not charged today, did the charge while in office, hopefully enough to get her home and plug it in.        Following HE protocol, did advise her to increase her Lantus insulin to 78 units at bedtime.  She will return in 3 weeks for sensor download.  She may be good candidate for Victoza or Trulicity- she will check with her insurance coverage.  She also plans to meet with DAMIÁN Maya and discuss addition of GLP-1.  She agreed to keep a food log for 3-5 days before next appt.  She is due for Alc, lipids, and microalbumin.     Subjective and Objective:      Kelley Davilasameersonya is referred by Dr. Hardin for Diabetes Education.     Lab Results   Component Value Date    HGBA1C 7.7 (H) 05/16/2018         Goals       calibrate            I will calibrate the  Dexcom sensor every 12 hours.   complete        medication            Kelley will take Novolog insulin 10 minutes prior to eating - complete        Plan meals (pt-stated)            Kelley will keep food records while wearing the Freestyle Liane glucose sensor-- did not do, she had a fall, sensor was removed.  8/8/18  She agreed to keep 3-5 days worth of food logs before next appt.            Follow up:       Education:     Monitoring   Meter (per above goals): assessment, discussed, pt returned demonstration,  Monitoring: assessment, discussed, pt returned demonstration, literature provided   BG goals: assessment, discussed, pt returned demonstration, literature provided      Nutrition Management:  assessment, discussed, pt returned demo,  literature provided   Weight:assessment, discussed, pt returned demo,  literature provided   Portions/Balance: assessment, discussed, pt returned demo,  literature provided   Carb ID/Count: assessment, discussed, pt returned demo,  literature provided   Label Reading: assessment, discussed, pt returned demo,  literature provided   Heart Healthy Fats:assessment, discussed, pt returned demo,  literature provided   Menu Planning: assessment, discussed, pt returned demo,  literature provided   Dining Out: assessment, discussed,  literature provided   Physical Activity: assessment, discussed,  literature provided   Medications: assessment, discussed  Orals: assessment, discussed  Injected Medications: Assessed and Discussed   Storage/Exp:Assessed   Site Rotation: Discussed   Diabetes Disease Process: Discussed    Acute Complications: Prevent, Detect, Treat:  Hypoglycemia: Discussed and Literature provided  Hyperglycemia: Discussed and Literature provided  Sick Days: Literature provided    Chronic Complications  Foot Care: literature provided  Skin Care: Literature provided  Eye: Literature provided  ABC: Literature provided  Teeth:Literature provided  Goal Setting and Problem Solving:  Discussed  Barriers: Discussed  Psychosocial Adjustments: Discussed      Time spent with the patient: 30 minutes for diabetes education and counseling.   Previous Education: yes  Visit Type:DSMT  Hours Remaining: DSMT 30 minutes and MNT 30 minutes      Bijal Kearns  9/5/2018

## 2021-06-20 NOTE — LETTER
Letter by Estelita Martin CHW at      Author: Estelita Martin CHW Service: -- Author Type: --    Filed:  Encounter Date: 3/10/2020 Status: (Other)       CARE COORDINATION  [address] [place] [address]    March 13, 2020    Kelley Styles  [address] [place] [address]      Dear Kelley,    I am a clinic care coordinator who works with Reyna Hardin MD at the [facility]. I have been trying to reach you recently to introduce Clinic Care Coordination and to see if there was anything I could assist you with.  Below is a description of clinic care coordination and how I can further assist you.      The clinic care coordinator team is made up of a registered nurse,  and community health worker who understand the health care system. The goal of clinic care coordination is to help you manage your health and improve access to the health care system in the most efficient manner. The team can assist you in meeting your health care goals by providing education, coordinating services, strengthening the communication among your providers  and supporting you with any resource needs.    Please feel free to contact the Community Health Worker, at [phone] with any questions or concerns. We are focused on providing you with the highest-quality healthcare experience possible and that all starts with you.     Sincerely,     Linda Martin  Community Health Worker

## 2021-06-22 NOTE — PROGRESS NOTES
"SUBJECTIVE: Kelley Styles is a 78 y.o. female with:  Chief Complaint   Patient presents with     Thyroid Problem     f/u    1) She started with a cough a couple of months ago.  No initial illness or respiratory infection.  Cough can occur after eating.  Cough is dry.  Occurs daily.  She will cough for 1 minute then cough stops.  Occurs every few hours.   No wheezing or shortness of breath.  No post nasal drip.  No heartburn.  No history of asthma or use of inhalers.  No seasonal allergies.  No itchy eyes or runny nose.  She does not feel sick.   She has been on lisinopril for many years.    2) Thyroid nodule- She had thyroid nodule biopsied which was benign.  She has noticed that she has had more hair loss.  She wonders if she has thyroid disease.    3) diabetes mellitus type 2 - continues on insulin.  Last A1C was acceptable.    4) She has not had a bone density test in a few years.    SH: Single.  Lives alone.    OBJECTIVE: /52 (Patient Site: Left Arm, Patient Position: Sitting, Cuff Size: Adult Large)   Pulse 77   Ht 5' 6\" (1.676 m)   Wt 215 lb 12 oz (97.9 kg)   SpO2 97%   BMI 34.82 kg/m     No acute distress.  HEENT: Head is atraumatic and/normocephalic.  PERRL.  Conjunctiva clear.  Tympanic membranes grey with normal landmarks and normal light reflexes.  No nasal discharge.  Oropharynx is pink and moist.  Sinuses nontender.  Neck: Supple.  No lymphadenopathy or thyromegaly.  Lungs: Clear to auscultation.  No wheezing, retractions, or tachypnea.  Heart: RRR. S1 and S2 normal.  No murmurs, rubs, or gallops.  Neuro: Awake, alert, oriented x 3.  Normal strength and tone.  Normal gait.     Kelley was seen today for thyroid problem.    Diagnoses and all orders for this visit:    Cough-Etiology unclear.  I suspect reflux.  Does not sound like asthma/ post nasal drip or post viral at this time.  If not improved, check CXR.  Update me if not improved with ranitidine.  -     ranitidine (ZANTAC) 150 MG " tablet; Take 1 tablet (150 mg total) by mouth 2 (two) times a day.    Hair loss  -     T3 (Triiodothyronine), Free  -     T4, Free  -     Thyroid Stimulating Hormone (TSH)    Post-menopausal  -     DXA Bone Density Scan; Future    Patient Instructions   Try ranitidine 150 mg twice a day for cough.    Schedule dexa bone scan with mammogram:    [facility]   [phone]- [place]   [phone]- [facility]   [phone]- [place]  [phone]- [place]        Reyna Hardin

## 2021-06-24 NOTE — PROGRESS NOTES
FOOT AND ANKLE SURGERY/PODIATRY Progress Note        ASSESSMENT:   Pre-ulcerative callus left great toe    HPI: Kelley Styles was seen again today complaining of a very painful callus on the bottom of her left great toe.  The patient stated that the pain is so severe that she has been altering her gait to stay away from the area of pain.  She has been noticing this painful callus for the past several months.  She does not recall any particular trauma to the toe.  She has not had any drainage of bleeding.  The pain is aggravated with weightbearing and ambulation.  The pain is easily relieved with nonweightbearing.  She has no pain while resting.  She denies any other previous treatment..      Past Medical History:   Diagnosis Date     Arthritis      Cataract      Diabetes mellitus (H)      Diverticulitis 2004     GERD (gastroesophageal reflux disease)      Glaucoma      Hyperlipidemia      Hypertension      Low back pain      Low vitamin D level 8/24/2016     Lower leg edema      Macular degeneration      Metabolic syndrome      Morbid obesity (H)      Osteopenia      Personal history of colonic polyps     adenomatous, removed 2015 colonoscopy       Past Surgical History:   Procedure Laterality Date     APPENDECTOMY       CARPAL TUNNEL RELEASE Left      CATARACT EXTRACTION Left 01/2017     KNEE ARTHROSCOPY       RI TOTAL HIP ARTHROPLASTY      Description: Total Hip Replacement;  Recorded: 03/29/2012;     thyroid biopsy       TONSILLECTOMY       TRIGGER FINGER RELEASE Left        No Known Allergies      Current Outpatient Medications:      aspirin 325 MG tablet, 650 mg every 6 (six) hours as needed. Half of 325mg every other day , Disp: , Rfl:      atorvastatin (LIPITOR) 40 MG tablet, TAKE ONE TABLET BY MOUTH AT BEDTIME, Disp: 90 tablet, Rfl: 2     blood glucose test (ONETOUCH ULTRA TEST) strips, Use 1 each As Directed 4 (four) times a day. Dispense brand per patient's insurance at pharmacy discretion. (E11.9),  Disp: 200 strip, Rfl: 2     blood-glucose meter,continuous (DEXCOM G5 ) Misc, use as directed, Disp: 1 each, Rfl: 0     blood-glucose sensor (DEXCOM G5-G4 SENSOR) Dawna, Change every 7 days, Disp: 4 Device, Rfl: 13     blood-glucose transmitter (DEXCOM G5 TRANSMITTER) Dawna, Change every 3 months, Disp: 1 Device, Rfl: 3     cholecalciferol, vitamin D3, (VITAMIN D3) 2,000 unit cap, Take 1 capsule by mouth 2 (two) times a day., Disp: , Rfl:      clobetasol (TEMOVATE) 0.05 % external solution, , Disp: , Rfl:      lancets (ONETOUCH DELICA LANCETS) 33 gauge Misc, Use to check blood sugars four times daily, Disp: 100 each, Rfl: 11     LANTUS U-100 INSULIN 100 unit/mL injection, INJECT 78 UNITS SUBCUTANEOUSLY AT BEDTIME, Disp: 24 mL, Rfl: 11     latanoprost (XALATAN) 0.005 % ophthalmic solution, Administer 1 drop to both eyes at bedtime., Disp: , Rfl:      lisinopril-hydrochlorothiazide (PRINZIDE,ZESTORETIC) 10-12.5 mg per tablet, TAKE 1 TABLET BY MOUTH BEFORE BREAKFAST, Disp: 90 tablet, Rfl: 3     multivitamin with minerals (THERA-M) 9 mg iron-400 mcg Tab tablet, Take 1 tablet by mouth daily., Disp: , Rfl:      NOVOLOG U-100 INSULIN ASPART 100 unit/mL injection, USE AS DIRECTED UP  UNITS A DAY AS NEEDED, Disp: 180 mL, Rfl: 3     ONETOUCH ULTRA BLUE TEST STRIP strips, USE ONE STRIP TO CHECK GLUCOSE 4 TIMES DAILY, Disp: 400 strip, Rfl: 2     ranitidine (ZANTAC) 150 MG tablet, Take 1 tablet (150 mg total) by mouth 2 (two) times a day., Disp: 60 tablet, Rfl: 1    Family History   Problem Relation Age of Onset     Dementia Mother      Stroke Mother      Heart disease Mother      Heart disease Father      Stroke Father      Other Maternal Grandmother         STROKE SYNDROM     No Medical Problems Maternal Grandfather      Heart disease Paternal Grandmother      Heart disease Paternal Grandfather      Breast cancer Paternal Aunt         late 70's     Obesity Paternal Aunt      No Medical Problems Brother      BRCA  1/2 Neg Hx      Cancer Neg Hx      Colon cancer Neg Hx      Endometrial cancer Neg Hx      Ovarian cancer Neg Hx        Social History     Socioeconomic History     Marital status:      Spouse name: Not on file     Number of children: Not on file     Years of education: Not on file     Highest education level: Not on file   Occupational History     Not on file   Social Needs     Financial resource strain: Not on file     Food insecurity:     Worry: Not on file     Inability: Not on file     Transportation needs:     Medical: Not on file     Non-medical: Not on file   Tobacco Use     Smoking status: Never Smoker     Smokeless tobacco: Never Used     Tobacco comment: pt states that 50yrs+, smoked for x 1 year only   Substance and Sexual Activity     Alcohol use: No     Comment: rare wine out for lunch     Drug use: No     Sexual activity: No     Partners: Male     Birth control/protection: Post-menopausal   Lifestyle     Physical activity:     Days per week: Not on file     Minutes per session: Not on file     Stress: Not on file   Relationships     Social connections:     Talks on phone: Not on file     Gets together: Not on file     Attends Jainism service: Not on file     Active member of club or organization: Not on file     Attends meetings of clubs or organizations: Not on file     Relationship status: Not on file     Intimate partner violence:     Fear of current or ex partner: Not on file     Emotionally abused: Not on file     Physically abused: Not on file     Forced sexual activity: Not on file   Other Topics Concern     Not on file   Social History Narrative    ,     Lives alone with a cat    Retired  for [employer]    No children       10 point Review of Systems is negative except as mentioned below.         Vitals:    03/07/19 1403   BP: 116/50   Temp: 98  F (36.7  C)       BMI= Body mass index is 34.7 kg/m .    OBJECTIVE:  General appearance: Patient is alert and  fully cooperative with history & exam.  No sign of distress is noted during the visit.  Vascular: Dorsalis pedis and posterior tibial pulses are palpable. There is pedal hair growth bilaterally.  CFT < 3 sec from anterior tibial surface to distal digits bilaterally. There is no appreciable edema noted.  Dermatologic: Nails bilateral feet are normal length this date.  There is a large round raised hyperkeratotic lesion on the plantar aspect of the left great toe.  Subdermal bleeding is noted.  There is pain on palpation of this lesion.  There is no edema or erythema noted left great toe.  Turgor and texture are within normal limits. No coloration or temperature changes. No primary or secondary lesions noted.  Neurologic: All epicritic and proprioceptive sensations are grossly intact bilaterally.  Musculoskeletal: All active and passive ankle, subtalar, midtarsal, and 1st MPJ range of motion are grossly intact without pain or crepitus, with the exception of none. Manual muscle strength is +5/5 bilaterally in all compartments. All dorsiflexors, plantarflexors, invertors, evertors are intact bilaterally. Tenderness present to plantar aspect left great toe on palpation.  No tenderness to left great toe with range of motion. Calf is soft and non-tender without warmth or induration    Imaging:     No results found.         TREATMENT:  I debrided the pre-ulcerative lesion on the plantar aspect of the left great toe.  I informed the patient that there is no sign of any infection at this particular time.  She is to monitor her condition and I have asked her to return to the clinic if she notices any increase in pain, edema, erythema, drainage or bleeding.     Andriy Riley; NERISSA  [facility] Foot & Ankle Surgery/Podiatry

## 2021-06-24 NOTE — TELEPHONE ENCOUNTER
Medication Question or Clarification  Who is calling: Patient  What medication are you calling about?:   LANTUS U-100 INSULIN 100 unit/mL injection 50 mL 2 12/13/2018     Sig: INJECT 60 UNITS SUBCUTANEOUSLY EVERY DAY AT BEDTIME    Sent to pharmacy as: LANTUS U-100 INSULIN 100 unit/mL injection      Who prescribed the medication?: Dr Hardin  What is your question/concern?: Patient states she needs a new Rx for 90 day supply and directions that states she is taking 78 units every day at bedtime.  Pharmacy: [facility] [address]  Okay to leave a detailed message?: Yes  Site CMT - Please call the pharmacy to obtain any additional needed information.

## 2021-06-24 NOTE — PATIENT INSTRUCTIONS - HE
Podiatry Clinics that offer foot and toenail care  [facility]  [facility]  [phone]    Foot and Ankle Clinics, [facility]  [phone]    [facility]  [place] - Dr. Tse on Tuesdays  [phone]    [facility]  [place]  [phone]    [facility]  [facility]  [phone]    [facility]  [phone]    [facility]  [phone]    [facility]  They have several locations and have a team of registered nurses that offer diabetic foot care.  They do not bill to insurance and the average cost per visit is $37.  [facility]  [phone]    [facility]  *Nurse comes to your home for nail care.  Anu Garcia RN Foot Specialist  [phone]

## 2021-06-25 NOTE — TELEPHONE ENCOUNTER
Refill Approved    Rx renewed per Medication Renewal Policy. Medication was last renewed on 6/25/2018.         Sourav Glez, [facility] Triage/Med Refill 3/21/2019     Requested Prescriptions   Pending Prescriptions Disp Refills     atorvastatin (LIPITOR) 40 MG tablet [Pharmacy Med Name: ATORVASTATIN 40MG   TAB] 90 tablet 2     Sig: TAKE 1 TABLET BY MOUTH AT BEDTIME    Statins Refill Protocol (Hmg CoA Reductase Inhibitors) Passed - 3/19/2019 11:03 AM       Passed - PCP or prescribing provider visit in past 12 months     Last office visit with prescriber/PCP: 12/5/2018 Reyna Hardin MD OR same dept: 12/5/2018 Reyna Hardin MD OR same specialty: 12/5/2018 Reyna Hardin MD  Last physical: 10/3/2017 Last MTM visit: Visit date not found   Next visit within 3 mo: Visit date not found  Next physical within 3 mo: Visit date not found  Prescriber OR PCP: Reyna Hardin MD  Last diagnosis associated with med order: 1. Hypercholesterolemia  - atorvastatin (LIPITOR) 40 MG tablet [Pharmacy Med Name: ATORVASTATIN 40MG   TAB]; TAKE 1 TABLET BY MOUTH AT BEDTIME  Dispense: 90 tablet; Refill: 2    If protocol passes may refill for 12 months if within 3 months of last provider visit (or a total of 15 months).

## 2021-06-30 NOTE — PROGRESS NOTES
"Progress Notes by Sandrine Cornell RN at 4/26/2021 11:00 AM     Author: Sandrine Cornell RN Service: -- Author Type: Registered Nurse    Filed: 4/27/2021  1:21 PM Encounter Date: 4/26/2021 Status: Attested    : Sandrine Cornell RN (Registered Nurse) Cosigner: Harmony Ireland DO at 4/27/2021  3:09 PM    Attestation signed by Harmony Ireland DO at 4/27/2021  3:09 PM    I have reviewed the notes, assessments, and/or procedures performed by Sandrine Cornell, I concur with her/his documentation of Kelley Styles.    Harmony Irleand D.O.  [facility]                  Assessment: Kelley Styles is a very pleasant 80 y.o.who presents today for for a consult to review her blood glucose and assess her current diabetes self-management skills with A1c presently not at ADA goal of <7.0 Kelley arrived alone and unfortunately did not have her meter or any glucose data with her. Per report, She has been checking  BG 3-4 times daily and states she is taking her diabetes medications daily as prescribed with no missed or skipped doses.  After our conversation today it was clearly evident that Kelley could greatly benefit from additional education regarding her diabetes management as well as her medications (insulin training) and some nutritional counseling.    Co morbidities include: Macular degeneration, HTN, GERD, hyper cholesterolemia, CKD, chronic back pain and diverticulitis.    Current diabetes medications: Lantus-she is not sure of the dose however did say she takes it at bedtime.  Chart review indicates 78 units SC daily.  NovoLog-chart review shows no specific dose before meals just up to 300 units daily-per her report she administers \"one needle full\" before meals or anytime she feels her blood sugars are too high-may administer 4 more times a day-we will often administer full dose of NovoLog with Lantus at bedtime.   Kelley had been using Victoza but discontinued herself because she " "could see no changes.  I had quite an extensive conversation with Kelley today regarding the mechanism of action of both basal and bolus insulin and the importance of timing when administering.  Being that she is not exactly sure of how much insulin she is actually giving herself each day I did request she bring in the syringes she is currently using at our next visit.    BG Summary: Since she did not have her meter or a log book with her today all BG data is solely from her recall alone.  Per her report fasting blood sugars are typically in the upper 100s-this morning was 175 mg/dL the highest she could most recently recall was 280 mg/dL.  The second time she will check her blood sugars is around 930 to 10 AM blood sugars can be \"up to 200 mg/dL\" the next time she will check will be around 2 PM and then again at at bedtime-unfortunately she could not recall any examples of numbers for me today.  Kelley did bring with her today a freestyle wilber personal CGM which she had prescribed to her a few months back but was uncertain on how to use.  I feel this device will be very beneficial to Kelley and helping her to monitor her blood sugars and better manage her diabetes.  Education was provided to her today on storage and expiration of sensors, application of sensor itself which included prepping skin and securing device to application unit, activating sensor with reader and interpreting information regarding blood glucose on reader.  Reminded her she must scan at least once every 8 hours to keep sensor active.  At her next visit I will help her establish a wilber account online so we can link it to the clinics portal.     Nutrition: Currently eating 3 meals each day    B: 1 egg with 2 slices of toast or oatmeal    black coffee  L: Usually some type of leftovers or a sandwich Kelley told me today she really likes to cook and will often prepare something that she can portion out and freeze to use for several meals (i.e. " bhavna.  D: Much the same thing she will have for lunch  HS snack rarely-she does like whole milk Greek yogurt  Beverages Crystal light, black coffee    Activity: Due to her chronic back pain Kelley is not as active as she would like to be she did say there are lots of stairs in her house which she goes up and down frequently.  I encouraged her to do what she is able reminding her that every little bit counts.    Plan: Advised her to scan her sensor before each meal and try to scan again 2 hours after meals each day , Kelley was reminded to bring meter and log book to all follow up appointments for review. Eat three balanced meals each day following the parameters for intake for all meals and snacks from ADA guidelines. Keep active - goal being 30 minutes daily of moderate activity. Medications: For safety reasons no changes were made to her medications today.  Hoping to determine how much insulin she is actually administering daily at our next visit.  In the future would like to have Kelley learn carb counting for dosing and establishing for her a standard correction scale as well.   Follow up has been scheduled for 5/3/2021. She was instructed to call with any questions/concerns that may come up before then.      Subjective and Objective:      Kelley FAN Stephani has been referred by Dr. Ireland for Diabetes Education.     Lab Results   Component Value Date    HGBA1C 7.2 (H) 12/07/2020         Wt 220 lb 6.4 oz (100 kg)   BMI 35.00 kg/m      Goals        General    ? Monitor      Will monitor the amount of insulin administered before each meal and at bedtime and record in logbook provided at visit today  4/27/2021           Other    ? Plan meals (pt-stated)      Kelley will keep food records while wearing the Freestyle Liane glucose sensor-    She agreed to keep 3-5 days worth of food logs before next appt.  4/27/2021              Follow up:   CDE (certified diabetic educator)      Education:     Monitoring   Meter  (per above goals): Assessed and Discussed  Monitoring: Assessed, Discussed and Needs instruction/review at follow-up  BG goals: Assessed, Discussed and Literature provided    Nutrition Management  Nutrition Management: Assessed, Discussed and Needs instruction/review at follow-up  Weight: Assessed and Discussed  Portions/Balance: Assessed, Discussed and Needs instruction/review at follow-up  Carb ID/Count: Assessed, Discussed and Needs instruction/review at follow-up  Label Reading: Needs instruction/review at follow-up  Heart Healthy Fats: Needs instruction/review at follow-up  Menu Planning: Assessed, Discussed and Needs instruction/review at follow-up  Dining Out: Discussed  Physical Activity: Assessed, Discussed and Needs instruction/review at follow-up  Medications: Assessed and Discussed  Orals: Assessed and Discussed  Injected Medications: Assessed and Discussed   Storage/Exp:Discussed   Site Rotation: Assessed and Discussed   Sites Assessed: no    Diabetes Disease Process: Assessed and Discussed    Acute Complications: Prevent, Detect, Treat:  Hypoglycemia: Assessed and Discussed  Hyperglycemia: Assessed, Discussed and Needs instruction/review at follow-up  Sick Days: Not addressed  Driving: Not addressed      Goal Setting and Problem Solving: Assessed and Discussed  Barriers: Assessed and Discussed  Psychosocial Adjustments: Assessed and Discussed      Time spent with the patient: 60 minutes for diabetes education and counseling.   Previous Education: yes 2018  Visit Type:DSMT  Hours Remaining: DSMT 1 and MNT 2      Sandrine Cornell RN CDCES  Diabetes Education  4/27/2021    Much or all of the text in this note was generated through the use of the Dragon Dictate voice-to-text software. Errors in spelling or words which seem out of context are unintentional. Sound alike errors, in particular, may have escaped editing.  Any diabetes medication dose changes were made via the CDE Protocol. A copy of this  encounter was shared with the provider.

## 2021-06-30 NOTE — PROGRESS NOTES
Progress Notes by Sandrine Cornell RN at 5/24/2021  3:00 PM     Author: Sandrine Cornell RN Service: -- Author Type: Registered Nurse    Filed: 5/25/2021  9:58 AM Encounter Date: 5/24/2021 Status: Attested    : Sandrine Cornell RN (Registered Nurse) Cosigner: Harmony Ireland DO at 5/25/2021 12:45 PM    Attestation signed by Harmony Ireland DO at 5/25/2021 12:45 PM    I have reviewed the notes, assessments, and/or procedures performed by Sandrine, I concur with her/his documentation of Kelley FAN Lolatanvi.    Harmony Ireland D.O.  [facility]                  Assessment: Kelley Styles is a  80 y.o.who presents today for an office visit to review blood sugars and assess current diabetes self-management skills with A1c presently not at ADA goal of <7.0 Kelley arrived alone and brought her reader with her. Per report, She has been checking  BG several times each day and states she has been taking both insulins daily with no missed or skipped doses. Kelley was wearing her Liane 14 day sensor and said things have been going well. She is getting more used to being aware the sensor is on her arm and has had less trouble with them catching on things.    Diabetes Risk Factors:   age over 45 years, hypertension, overweight/obesity and inactivity    Relevant complications, co-morbidities and related health problems:  Significant for:  overweight/obesity, hypertension and macular degeneration, CKD    Current diabetes medications: Lantus-per her report is administering between one half and 1 full syringe daily, Novolog with meals-has been administering approximately one fourth of syringe with meals  BG Summary:         Kelley's liane data was reviewed and discussed with her during her visit today with 94% of her blood sugars being in target range.  Noted that she had several blood sugar readings of 69 and 70 occurring at various times throughout the day.  From further discussion learned that Kelley  has been administering insulin with each small meal she has been having-which is occurring approximately every 2 hours.  She said she is dosing her insulin based upon her blood sugar before eating as well as the food she intends to eat.  For the most part it has been working for her however I did voice my concern and cautioned her about the possible risk of stacking her insulin.  I feel this may have been the case when her blood sugars went to 69 and 70 mg/dL.     Nutrition: Currently eating 5 small meals each day  -Kelley stated today she has started intermittent fasting.  She eats 5 small meals each day between 9 AM and 5 PM.  So far she says it has been going well, she feels satiated and has not been hungry like she used to be.  B: Has a Greek yogurt a couple eggs and a cup of coffee  Her other small meals throughout the day consist of things such as protein bars coleslaw, raw vegetables such as red and green peppers, proteins: Chicken/tuna/imitation crab    Activity: Working in yard - has been cleaning out garage - housework    Plan: Continue to scan BG several times throughout the day - especially before eating each day , Kelley was reminded to bring meter and log book to all follow up appointments for review. Eat  balanced meals each day following the parameters for intake for all meals and snacks from ADA guidelines. Keep active - goal being 30 minutes daily of moderate activity. Medications: continue Lantus and Novolog.   Follow up has been scheduled for 6/28 . She was instructed to call with any questions/concerns that may come up before then.  Kelley was given a complimentary Liane 2 reader and 2 week sensor and will be switching her prescription to this device. Liane 2 is equipped with low/high warning alarms which were set for her today. Being that she did have some hypoglycemic episodes, having the alarms will be beneficial - especially NOC    Subjective and Objective:      Kelley Styles has been  referred by Dr Ireland for Diabetes Education.     Lab Results   Component Value Date    HGBA1C 7.6 (H) 05/24/2021         Wt 215 lb 8 oz (97.8 kg)   BMI 34.22 kg/m      Goals        General    ? Monitor      Will monitor the amount of insulin administered before each meal and at bedtime and record in logbook provided at visit today  4/27/2021           Other    ? Plan meals (pt-stated)      Kelley will keep food records while wearing the Freestyle Liane glucose sensor-    She agreed to keep 3-5 days worth of food logs before next appt.  4/27/2021              Follow up:   CDE (certified diabetic educator)    Education Materials Provided:  Instruction book and quick start guide for Liane 2    Education:     Monitoring   Meter (per above goals): Assessed, Discussed and Competent  Monitoring: Assessed, Discussed, Literature provided and Competent  BG goals: Assessed, Discussed and Literature provided    Nutrition Management  Nutrition Management: Assessed and Discussed  Weight: Assessed and Discussed  Portions/Balance: Assessed and Discussed  Carb ID/Count: Discussed  Label Reading: Discussed  Heart Healthy Fats: Not addressed  Menu Planning: Assessed and Discussed  Dining Out: Not addressed  Physical Activity: Assessed and Discussed  Medications: Assessed and Discussed  Orals: Assessed  Injected Medications: Assessed and Discussed   Storage/Exp:Not addressed   Site Rotation: Assessed and Discussed   Sites Assessed: No    Diabetes Disease Process: Assessed and Discussed    Acute Complications: Prevent, Detect, Treat:  Hypoglycemia: Assessed, Discussed and Needs instruction/review at follow-up  Hyperglycemia: Assessed and Discussed  Sick Days: Not addressed  Driving: Not addressed    Goal Setting and Problem Solving: Assessed and Discussed  Barriers: Assessed and Discussed  Psychosocial Adjustments: Assessed and Discussed      Time spent with the patient: 60 minutes for diabetes education and counseling.   Previous  Education: Yes  Visit Type: DSMT        Sandrine Cornell RN CDCES  Diabetes Education  5/24/2021    Much or all of the text in this note was generated through the use of the Dragon Dictate voice-to-text software. Errors in spelling or words which seem out of context are unintentional. Sound alike errors, in particular, may have escaped editing.  Any diabetes medication dose changes were made via the CDE Protocol. A copy of this encounter was shared with the provider.

## 2021-07-01 ENCOUNTER — COMMUNICATION - HEALTHEAST (OUTPATIENT)
Dept: FAMILY MEDICINE | Facility: CLINIC | Age: 81
End: 2021-07-01

## 2021-07-01 DIAGNOSIS — E11.65 UNCONTROLLED TYPE 2 DIABETES MELLITUS WITH HYPERGLYCEMIA (H): ICD-10-CM

## 2021-07-03 ENCOUNTER — RECORDS - HEALTHEAST (OUTPATIENT)
Dept: FAMILY MEDICINE | Facility: CLINIC | Age: 81
End: 2021-07-03

## 2021-07-03 NOTE — ADDENDUM NOTE
Addendum Note by Crystal Tiwari, PharmFLORIAN at 5/16/2018 11:19 AM     Author: Crystal Tiwari PharmD Service: -- Author Type: Pharmacist    Filed: 5/16/2018 11:19 AM Encounter Date: 5/16/2018 Status: Signed    : Crsytal Tiwari PharmD (Pharmacist)    Addended by: CRYSTAL TIWARI on: 5/16/2018 11:19 AM        Modules accepted: Orders

## 2021-07-03 NOTE — ADDENDUM NOTE
Addendum Note by Noa Elizondo MD at 5/22/2020  1:09 PM     Author: Noa Elizondo MD Service: -- Author Type: Physician    Filed: 5/22/2020  1:09 PM Encounter Date: 5/18/2020 Status: Signed    : Noa Elizondo MD (Physician)    Addended by: NOA ELIZONDO on: 5/22/2020 01:09 PM        Modules accepted: Orders

## 2021-07-03 NOTE — ADDENDUM NOTE
Addendum Note by May Gregory LPN at 5/22/2020  9:40 AM     Author: May Gregory LPN Service: -- Author Type: Licensed Nurse    Filed: 5/22/2020  9:40 AM Encounter Date: 5/18/2020 Status: Signed    : May Gregory LPN (Licensed Nurse)    Addended by: MAY GREGORY on: 5/22/2020 09:40 AM        Modules accepted: Orders

## 2021-07-04 ENCOUNTER — HEALTH MAINTENANCE LETTER (OUTPATIENT)
Age: 81
End: 2021-07-04

## 2021-07-04 NOTE — TELEPHONE ENCOUNTER
Telephone Encounter by Beth Ramirez RN at 7/3/2021  3:06 PM     Author: Beth Ramirez RN Service: -- Author Type: Registered Nurse    Filed: 7/3/2021  3:06 PM Encounter Date: 7/3/2021 Status: Signed    : Beth Ramirez RN (Registered Nurse)       RN cannot approve Refill Request    RN can NOT refill this medication PCP messaged that patient is overdue for Office Visit. Last office visit: 3/11/2020 Reyna Hardin MD Last Physical: 10/3/2017 Last MTM visit: Visit date not found Last visit same specialty: 12/11/2020 Harmony Ireland DO.  Next visit within 3 mo: Visit date not found  Next physical within 3 mo: Visit date not found      [facility] Connection Triage/Med Refill 7/3/2021    Requested Prescriptions   Pending Prescriptions Disp Refills   ? NOVOLOG U-100 INSULIN ASPART 100 unit/mL injection [Pharmacy Med Name: NOVOLOG U-100 INSULIN VL10ML(ORANG)] 210 mL 0     Sig: INJECT BEFORE MEALS THREE TIMES DAILY UP  UNITS PER DAY       Insulin/GLP-1 Refill Protocol Failed - 7/3/2021 10:27 AM        Failed - Visit with PCP or prescribing provider visit in last 6 months     Last office visit with prescriber/PCP: Visit date not found OR same dept: Visit date not found OR same specialty: 12/11/2020 Harmony Ireland DO Last physical: Visit date not found Last MTM visit: Visit date not found     Next appt within 3 mo: Visit date not found  Next physical within 3 mo: Visit date not found  Prescriber OR PCP: Reyna Hardin MD  Last diagnosis associated with med order: 1. Uncontrolled type 2 diabetes mellitus with hyperglycemia (H)  - NOVOLOG U-100 INSULIN ASPART 100 unit/mL injection [Pharmacy Med Name: NOVOLOG U-100 INSULIN VL10ML(ORANG)]; INJECT BEFORE MEALS THREE TIMES DAILY UP  UNITS PER DAY  Dispense: 210 mL; Refill: 0    If protocol passes may refill for 6 months if within 3 months of last provider visit (or a total of 9 months).               Failed - Microalbumin in last year     Microalbumin, Random Urine   Date Value Ref Range Status   03/11/2020 1.04 0.00 - 1.99 mg/dL Final                  Passed - A1C in last 6 months     Hemoglobin A1c   Date Value Ref Range Status   05/24/2021 7.6 (H) <=5.6 % Final               Passed - Blood pressure in last year     BP Readings from Last 1 Encounters:   01/12/21 128/67             Passed - Creatinine done in last year     Creatinine   Date Value Ref Range Status   12/07/2020 1.07 0.60 - 1.10 mg/dL Final

## 2021-07-04 NOTE — TELEPHONE ENCOUNTER
Telephone Encounter by Cecille Rogers RN at 7/1/2021  9:10 AM     Author: Cecille Rogers RN Service: -- Author Type: Registered Nurse    Filed: 7/1/2021  9:11 AM Encounter Date: 7/1/2021 Status: Signed    : Cecille Rogers RN (Registered Nurse)       RN cannot approve Refill Request    RN can NOT refill this medication PCP messaged that patient is overdue for Labs. Last office visit: 12/11/2020 Harmony Ireland DO Last Physical: 1/4/2021 Last MTM visit: Visit date not found Last visit same specialty: 12/11/2020 Harmony Ireland DO.  Next visit within 3 mo: Visit date not found  Next physical within 3 mo: Visit date not found      [facility] Connection Triage/Med Refill 7/1/2021    Requested Prescriptions   Pending Prescriptions Disp Refills   ? insulin aspart U-100 (NOVOLOG U-100 INSULIN ASPART) 100 unit/mL injection 20 mL 3     Sig: Inject before meals three times a day up to 300 uints daily 11.9 Type 2 without complications       Insulin/GLP-1 Refill Protocol Failed - 7/1/2021  9:07 AM        Failed - Microalbumin in last year     Microalbumin, Random Urine   Date Value Ref Range Status   03/11/2020 1.04 0.00 - 1.99 mg/dL Final                  Passed - Visit with PCP or prescribing provider visit in last 6 months     Last office visit with prescriber/PCP: Visit date not found OR same dept: 12/11/2020 Harmony Ireland DO OR same specialty: 12/11/2020 Harmony Ireland DO Last physical: 1/4/2021 Last MTM visit: Visit date not found     Next appt within 3 mo: Visit date not found  Next physical within 3 mo: Visit date not found  Prescriber OR PCP: Harmony Ireland DO  Last diagnosis associated with med order: 1. Uncontrolled type 2 diabetes mellitus with hyperglycemia (H)  - insulin aspart U-100 (NOVOLOG U-100 INSULIN ASPART) 100 unit/mL injection; Inject before meals three times a day up to 300 uints daily 11.9 Type 2 without  complications  Dispense: 20 mL; Refill: 3    If protocol passes may refill for 6 months if within 3 months of last provider visit (or a total of 9 months).              Passed - A1C in last 6 months     Hemoglobin A1c   Date Value Ref Range Status   05/24/2021 7.6 (H) <=5.6 % Final               Passed - Blood pressure in last year     BP Readings from Last 1 Encounters:   01/12/21 128/67             Passed - Creatinine done in last year     Creatinine   Date Value Ref Range Status   12/07/2020 1.07 0.60 - 1.10 mg/dL Final

## 2021-07-05 ENCOUNTER — COMMUNICATION - HEALTHEAST (OUTPATIENT)
Dept: FAMILY MEDICINE | Facility: CLINIC | Age: 81
End: 2021-07-05

## 2021-07-05 DIAGNOSIS — E11.65 UNCONTROLLED TYPE 2 DIABETES MELLITUS WITH HYPERGLYCEMIA (H): ICD-10-CM

## 2021-07-05 NOTE — TELEPHONE ENCOUNTER
Telephone Encounter by Cecille Rogers RN at 7/5/2021  9:52 AM     Author: Cecille Rogers RN Service: -- Author Type: Registered Nurse    Filed: 7/5/2021  9:52 AM Encounter Date: 7/5/2021 Status: Signed    : Cecille Rogers RN (Registered Nurse)       RN cannot approve Refill Request    RN can NOT refill this medication PCP messaged that patient is overdue for Labs and Office Visit. Last office visit: 12/11/2020 Harmony Ireland DO Last Physical: 1/4/2021 Last MTM visit: Visit date not found Last visit same specialty: 12/11/2020 Harmony Ireland DO.  Next visit within 3 mo: Visit date not found  Next physical within 3 mo: Visit date not found      [facility] Connection Triage/Med Refill 7/5/2021    Requested Prescriptions   Pending Prescriptions Disp Refills   ? insulin aspart U-100 (NOVOLOG) 100 unit/mL injection [Pharmacy Med Name: INSULIN ASPART 100/ML INJ,10ML] 50 mL 0     Sig: INJECT 20 UNITS UNDER THE SKIN 3 TIMES A DAY BEFORE MEALS.       Insulin/GLP-1 Refill Protocol Failed - 7/5/2021  9:46 AM        Failed - Visit with PCP or prescribing provider visit in last 6 months     Last office visit with prescriber/PCP: Visit date not found OR same dept: 12/11/2020 Harmony Ireland DO OR same specialty: 12/11/2020 Harmony Ireland DO Last physical: Visit date not found Last MTM visit: Visit date not found     Next appt within 3 mo: Visit date not found  Next physical within 3 mo: Visit date not found  Prescriber OR PCP: Harmony Ireland DO  Last diagnosis associated with med order: 1. Uncontrolled type 2 diabetes mellitus with hyperglycemia (H)  - insulin aspart U-100 (NOVOLOG) 100 unit/mL injection [Pharmacy Med Name: INSULIN ASPART 100/ML INJ,10ML]; INJECT 20 UNITS UNDER THE SKIN 3 TIMES A DAY BEFORE MEALS.  Dispense: 50 mL; Refill: 0    If protocol passes may refill for 6 months if within 3 months of last provider visit (or a total of 9  months).              Failed - Microalbumin in last year     Microalbumin, Random Urine   Date Value Ref Range Status   03/11/2020 1.04 0.00 - 1.99 mg/dL Final                  Passed - A1C in last 6 months     Hemoglobin A1c   Date Value Ref Range Status   05/24/2021 7.6 (H) <=5.6 % Final               Passed - Blood pressure in last year     BP Readings from Last 1 Encounters:   01/12/21 128/67             Passed - Creatinine done in last year     Creatinine   Date Value Ref Range Status   12/07/2020 1.07 0.60 - 1.10 mg/dL Final

## 2021-07-06 VITALS — WEIGHT: 215.5 LBS | BODY MASS INDEX: 34.22 KG/M2

## 2021-07-08 ENCOUNTER — COMMUNICATION - HEALTHEAST (OUTPATIENT)
Dept: FAMILY MEDICINE | Facility: CLINIC | Age: 81
End: 2021-07-08

## 2021-07-09 ENCOUNTER — AMBULATORY - HEALTHEAST (OUTPATIENT)
Dept: FAMILY MEDICINE | Facility: CLINIC | Age: 81
End: 2021-07-09

## 2021-07-09 DIAGNOSIS — E11.65 UNCONTROLLED TYPE 2 DIABETES MELLITUS WITH HYPERGLYCEMIA (H): ICD-10-CM

## 2021-07-09 RX ORDER — SEMAGLUTIDE 1.34 MG/ML
0.25 INJECTION, SOLUTION SUBCUTANEOUS WEEKLY
Qty: 3 ML | Refills: 1 | Status: SHIPPED | OUTPATIENT
Start: 2021-07-09 | End: 2021-07-22

## 2021-07-13 ENCOUNTER — RECORDS - HEALTHEAST (OUTPATIENT)
Dept: ADMINISTRATIVE | Facility: CLINIC | Age: 81
End: 2021-07-13

## 2021-07-21 ENCOUNTER — RECORDS - HEALTHEAST (OUTPATIENT)
Dept: ADMINISTRATIVE | Facility: CLINIC | Age: 81
End: 2021-07-21

## 2021-07-22 ENCOUNTER — RECORDS - HEALTHEAST (OUTPATIENT)
Dept: MAMMOGRAPHY | Facility: CLINIC | Age: 81
End: 2021-07-22

## 2021-07-22 ENCOUNTER — TELEPHONE (OUTPATIENT)
Dept: FAMILY MEDICINE | Facility: CLINIC | Age: 81
End: 2021-07-22

## 2021-07-22 DIAGNOSIS — Z12.31 OTHER SCREENING MAMMOGRAM: ICD-10-CM

## 2021-07-22 DIAGNOSIS — E11.65 UNCONTROLLED TYPE 2 DIABETES MELLITUS WITH HYPERGLYCEMIA (H): ICD-10-CM

## 2021-07-22 NOTE — TELEPHONE ENCOUNTER
Routing refill request to provider for review/approval because:  Drug not on [place] [facility] [place]   Provider never ordered medication on 7/9/21. See below. There is no indication this was ordered by provider.       Last Written Prescription Date:  7/9/21  Last Fill Quantity: 3ml,  # refills: 1   Last office visit provider:        Requested Prescriptions   Pending Prescriptions Disp Refills     Semaglutide(0.25 or 0.5MG/DOS) (OZEMPIC (0.25 OR 0.5 MG/DOSE)) 2 MG/1.5ML SOPN 3 mL 1     Sig: Inject 0.25 mg Subcutaneous once a week       There is no refill protocol information for this order          Cecille Rogers RN 07/22/21 12:33 PM

## 2021-07-22 NOTE — TELEPHONE ENCOUNTER
Reason for Call:  Medication or medication refill:    Do you use a [facility]?  Name of the pharmacy and phone number for the current request:  [facility] mail order    This med was send to Ian on 7/9/21    Name of the medication requested:   semaglutide (OZEMPIC) 0.25 mg or 0.5 mg(2 mg/1.5 mL) PnIj 3 mL 1 7/9/2021  --   Sig - Route: Inject 0.25 mg under the skin once a week. - Subcutaneous         Other request: na    Can we leave a detailed message on this number? YES    Phone number patient can be reached at: Home number on file [phone] (home)    Best Time: any    Call taken on 7/22/2021 at 9:29 AM by Pam J. Behr

## 2021-07-23 ENCOUNTER — TELEPHONE (OUTPATIENT)
Dept: FAMILY MEDICINE | Facility: CLINIC | Age: 81
End: 2021-07-23

## 2021-07-23 RX ORDER — SEMAGLUTIDE 1.34 MG/ML
0.25 INJECTION, SOLUTION SUBCUTANEOUS WEEKLY
Qty: 3 ML | Refills: 1 | Status: SHIPPED | OUTPATIENT
Start: 2021-07-23 | End: 2022-03-28

## 2021-07-23 NOTE — TELEPHONE ENCOUNTER
Prior Authorization Not Needed per Insurance    Medication: Semaglutide,0.25 or 0.5MG/DOS, (OZEMPIC, 0.25 OR 0.5 MG/DOSE,) 2 MG/1.5ML SOPN  Insurance Company: [facility] - Phone [phone] Fax [phone]  Expected CoPay:      Pharmacy Filling the Rx: [facility] - [address]  Pharmacy Notified: Yes  Patient Notified: Yes        Called [facility] mail order [phone] to confirm they have a paid claim. Talked to rep from the mail order dept and he stated that they do have a paid claim, no further action needed on my end.  They will contact patient and set up a delivery.

## 2021-07-23 NOTE — TELEPHONE ENCOUNTER
PA Initiation    Medication: Semaglutide,0.25 or 0.5MG/DOS, (OZEMPIC, 0.25 OR 0.5 MG/DOSE,) 2 MG/1.5ML SOPN  Insurance Company: [facility] - Phone [phone] Fax [phone]  Pharmacy Filling the Rx: [facility] - [address]  Filling Pharmacy Phone: [phone]  Filling Pharmacy Fax:    Start Date: 7/23/2021

## 2021-08-09 ENCOUNTER — OFFICE VISIT (OUTPATIENT)
Dept: EDUCATION SERVICES | Facility: CLINIC | Age: 81
End: 2021-08-09
Payer: COMMERCIAL

## 2021-08-09 VITALS — BODY MASS INDEX: 31.62 KG/M2 | WEIGHT: 199.1 LBS

## 2021-08-09 DIAGNOSIS — E11.65 UNCONTROLLED TYPE 2 DIABETES MELLITUS WITH HYPERGLYCEMIA (H): Primary | ICD-10-CM

## 2021-08-09 PROCEDURE — G0108 DIAB MANAGE TRN  PER INDIV: HCPCS | Mod: AE | Performed by: INTERNAL MEDICINE

## 2021-08-09 RX ORDER — SYRINGE-NEEDLE,INSULIN,0.5 ML 27GX1/2"
SYRINGE, EMPTY DISPOSABLE MISCELLANEOUS
Qty: 360 EACH | Refills: 3 | Status: SHIPPED | OUTPATIENT
Start: 2021-08-09 | End: 2023-07-20

## 2021-08-09 NOTE — LETTER
"    8/9/2021         RE: Kelley Styles  [address] [facility] [id number]        Dear Colleague,    Thank you for referring your patient, Kelley Styles, to the [facility]. Please see a copy of my visit note below.    Diabetes Self-Management Education & Support    Presents for: Individual review    SUBJECTIVE/OBJECTIVE:  Presents for: Individual review  Accompanied by: Self  Diabetes education in the past 24 mo: Yes  Focus of Visit: Reducing Risks, Healthy Coping, Healthy Eating, Problem Solving, Being Active  Diabetes type: Type 2  Disease course: Improving  How confident are you filling out medical forms by yourself:: Quite a bit  Transportation concerns: No  Cultural Influences/Ethnic Background:  Not  or       Diabetes Symptoms & Complications:  Weight trend: Decreasing  Complications assessed today?: Yes  Retinopathy: Yes (Patient is currently being followed by ophthalmology)    Patient Problem List and Family Medical History reviewed for relevant medical history, current medical status, and diabetes risk factors.    Vitals:  Wt 90.3 kg (199 lb 1.6 oz)   BMI 31.62 kg/m    Estimated body mass index is 31.62 kg/m  as calculated from the following:    Height as of 1/12/21: 1.69 m (5' 6.54\").    Weight as of this encounter: 90.3 kg (199 lb 1.6 oz).   Last 3 BP:   BP Readings from Last 3 Encounters:   01/12/21 128/67   01/04/21 122/56   12/11/20 132/58       History   Smoking Status     Never Smoker   Smokeless Tobacco     Never Used     Comment: pt states that 52yrs+, smoked for x 1 year only       Labs:  Lab Results   Component Value Date    A1C 7.6 05/24/2021     Lab Results   Component Value Date     12/07/2020     Lab Results   Component Value Date    LDL 54 12/07/2020    LDL 82 10/14/2019     Direct Measure HDL   Date Value Ref Range Status   12/07/2020 39 (L) >=50 mg/dL Final   ]  GFR Estimate   Date Value Ref Range Status   12/07/2020 50 (L) " ">60 mL/min/1.73m2 Final     GFR Estimate If Black   Date Value Ref Range Status   12/07/2020 60 (L) >60 mL/min/1.73m2 Final     Lab Results   Component Value Date    CR 1.07 12/07/2020     No results found for: MICROALBUMIN    Healthy Eating:  Healthy Eating Assessed Today: Yes  Cultural/Faith diet restrictions?: No  Meal planning/habits: Avoiding sweets, Smaller portions  How many times a week on average do you eat food made away from home (restaurant/take-out)?: 0  Meals include: Breakfast, Lunch, Dinner  Breakfast: Greek yogurt with either fresh strawberries or fresh peaches or egg whites with cheese  Lunch: Can be leftovers, might roasted chicken that will be spread over several minerals during the week, salad  Dinner: Much the same as lunch  Snacks: Power bars with 150 maksim and 10 g of protein  Other: Reported today that her sweet tooth has very much improved and she continues to work on food choices and balanced meals  Beverages: Tea, Juice, Other, Diet soda (Juices tomato juice also drinks quite a bit of Crystal light)    Being Active:  Being Active Assessed Today: Yes  Exercise:: Unable to exercise  Barrier to exercise: Physical limitation (Chronic hip pain makes it difficult for Kelley and limits her physical activity)    Monitoring:  Monitoring Assessed Today: Yes  Did patient bring glucose meter to appointment? : Yes  Blood Glucose Meter: CGM (Freestyle wilber #2)  Times checking blood sugar at home (number): 5+  Times checking blood sugar at home (per): Day  Blood glucose trend: Decreasing    Kelley had her wilber 2 reader with her today, data was downloaded reviewed and discussed.  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                    When trying to determine what was causing the lows, suspect it may be related to Kelley administering \"a little extra insulin\" here and there when she feels she " is too high.  Once again cautioned her about administering insulin at times too closely together which could cause stacking and hypoglycemia.    Taking Medications:  Diabetes Medication(s)     Insulin       insulin aspart U-100 (NOVOLOG U-100 INSULIN ASPART) 100 unit/mL injection    [INSULIN ASPART U-100 (NOVOLOG U-100 INSULIN ASPART) 100 UNIT/ML INJECTION] Inject 20 Units under the skin 3 (three) times a day before meals. 11.9 Type 2 without complications     LANTUS U-100 INSULIN 100 unit/mL vial    [LANTUS U-100 INSULIN 100 UNIT/ML VIAL] INJECT 78 UNITS UNDER THE SKIN AT BEDTIME     insulin aspart U-100 (NOVOLOG U-100 INSULIN ASPART) 100 unit/mL injection    [INSULIN ASPART U-100 (NOVOLOG U-100 INSULIN ASPART) 100 UNIT/ML INJECTION] Inject before meals 3x daily up to 300 U / day    Incretin Mimetic Agents (GLP-1 Receptor Agonists)       Semaglutide,0.25 or 0.5MG/DOS, (OZEMPIC, 0.25 OR 0.5 MG/DOSE,) 2 MG/1.5ML SOPN    Inject 0.25 mg Subcutaneous once a week          Taking Medication Assessed Today: Yes  Current Treatments: Insulin Injections, Non-insulin Injectables  Dose schedule: Pre-breakfast, Pre-lunch, Pre-dinner  Given by: Patient  Injection/Infusion sites: Abdomen  Problems taking diabetes medications regularly?: No  Diabetes medication side effects?: No    Problem Solving:  Problem Solving Assessed Today: Yes  Is the patient at risk for hypoglycemia?: Yes  Hypoglycemia Frequency: Weekly  Hypoglycemia Treatment: Juice (Milk, keeps M&Ms at bedside)  Medical ID: No  Is the patient at risk for DKA?: No              Reducing Risks:  Reducing Risks Assessed Today: Yes  Diabetes Risks: Age over 45 years, Sedentary Lifestyle, Hyperlipidemia  CAD Risks: Diabetes Mellitus, Family history, Hypertension, Obesity, Post-menopausal, Sedentary lifestyle  Has dilated eye exam at least once a year?: Yes  Feet checked by healthcare provider in the last year?: Yes    Healthy Coping:  Healthy Coping Assessed Today:  Yes  Emotional response to diabetes: Ready to learn  Stage of change: ACTION (Actively working towards change)  Support resources: In-person Offerings  Patient Activation Measure Survey Score:  No flowsheet data found.    Diabetes knowledge and skills assessment:   Patient is knowledgeable in diabetes management concepts related to: Healthy Eating, Monitoring, Taking Medication and Healthy Coping    Patient needs further education on the following diabetes management concepts: Being Active, Taking Medication and Problem Solving    Based on learning assessment above, most appropriate setting for further diabetes education would be: Individual setting.      INTERVENTIONS:    REPORTS:  See above    Education provided today on:  AADE Self-Care Behaviors:  Being Active: describe appropriate activity program  Taking Medication: action of prescribed medication, drawing up, administering and storing injectable diabetes medications, proper site selection and rotation for injections and when to take medications  Problem Solving: high blood glucose - causes, signs/symptoms, treatment and prevention, low blood glucose - causes, signs/symptoms, treatment and prevention and carrying a carbohydrate source at all times  Reducing Risks: major complications of diabetes, prevention, early diagnostic measures and treatment of complications and A1C - goals, relating to blood glucose levels, how often to check    Pt verbalized understanding of concepts discussed and recommendations provided today.       Education Materials Provided:  No new materials provided today    ASSESSMENT:  Kelley is a very pleasant 81-year-old who presents today for an office visit to review BG and assess her current diabetes management skills with A1c not at ADA goal of less than 7%.  She arrived today unaccompanied and had her CGM reader with her.  Per her report that was confirmed through documentation she is scanning to check her blood sugar several times each  day-she also states she has been taking both insulins each day as prescribed with no missed or skipped doses and started Ozempic 0.25 mg/weekly this last Saturday.  She is continuing to lose weight and from our conversation today appears to be doing very well with managing her diabetes.  Due to her hypoglycemic unawareness, the personal CGM is proving to be a very helpful invaluable tool for her daily.  Glucose Patterns & Trends:  Hypoglycemia, weekday- postmeal      PLAN  See Patient Instructions for co-developed, patient-stated behavior change goals.  AVS printed and provided to patient today. See Follow-Up section for recommended follow-up.  Encouraged her to continue scanning several times a day she has been.  Informed her that as her Ozempic dose increases she is most likely to have lesser insulin requirements and to keep close eye on her blood glucose-both before and after meals.  We will follow-up with her in approximately 5 weeks to review BG - at that time determine if Ozempic dose will need to be increased. Kelley was instructed her to call with any questions or concerns that may come up before then.    Thank you,  Sanrdine Vu RN [facility]  Certified Diabetes Care and   Visit type : DSMT      Time Spent: 60 minutes  Encounter Type: Individual    Any diabetes medication dose changes were made via the CDE Protocol and Collaborative Practice Agreement with the patient's primary care provider. A copy of this encounter was shared with the provider.    Much or all of the text in this note was generated through the use of the Dragon Dictate voice-to-text software.Errors in spelling or words which seem out of context are unintentional. Sound alike errors, in particular, may have escaped editing.

## 2021-08-09 NOTE — PROGRESS NOTES
"Diabetes Self-Management Education & Support    Presents for: Individual review    SUBJECTIVE/OBJECTIVE:  Presents for: Individual review  Accompanied by: Self  Diabetes education in the past 24 mo: Yes  Focus of Visit: Reducing Risks, Healthy Coping, Healthy Eating, Problem Solving, Being Active  Diabetes type: Type 2  Disease course: Improving  How confident are you filling out medical forms by yourself:: Quite a bit  Transportation concerns: No  Cultural Influences/Ethnic Background:  Not  or       Diabetes Symptoms & Complications:  Weight trend: Decreasing  Complications assessed today?: Yes  Retinopathy: Yes (Patient is currently being followed by ophthalmology)    Patient Problem List and Family Medical History reviewed for relevant medical history, current medical status, and diabetes risk factors.    Vitals:  Wt 90.3 kg (199 lb 1.6 oz)   BMI 31.62 kg/m    Estimated body mass index is 31.62 kg/m  as calculated from the following:    Height as of 1/12/21: 1.69 m (5' 6.54\").    Weight as of this encounter: 90.3 kg (199 lb 1.6 oz).   Last 3 BP:   BP Readings from Last 3 Encounters:   01/12/21 128/67   01/04/21 122/56   12/11/20 132/58       History   Smoking Status     Never Smoker   Smokeless Tobacco     Never Used     Comment: pt states that 52yrs+, smoked for x 1 year only       Labs:  Lab Results   Component Value Date    A1C 7.6 05/24/2021     Lab Results   Component Value Date     12/07/2020     Lab Results   Component Value Date    LDL 54 12/07/2020    LDL 82 10/14/2019     Direct Measure HDL   Date Value Ref Range Status   12/07/2020 39 (L) >=50 mg/dL Final   ]  GFR Estimate   Date Value Ref Range Status   12/07/2020 50 (L) >60 mL/min/1.73m2 Final     GFR Estimate If Black   Date Value Ref Range Status   12/07/2020 60 (L) >60 mL/min/1.73m2 Final     Lab Results   Component Value Date    CR 1.07 12/07/2020     No results found for: MICROALBUMIN    Healthy Eating:  Healthy Eating " "Assessed Today: Yes  Cultural/Roman Catholic diet restrictions?: No  Meal planning/habits: Avoiding sweets, Smaller portions  How many times a week on average do you eat food made away from home (restaurant/take-out)?: 0  Meals include: Breakfast, Lunch, Dinner  Breakfast: Greek yogurt with either fresh strawberries or fresh peaches or egg whites with cheese  Lunch: Can be leftovers, might roasted chicken that will be spread over several minerals during the week, salad  Dinner: Much the same as lunch  Snacks: Power bars with 150 maksim and 10 g of protein  Other: Reported today that her sweet tooth has very much improved and she continues to work on food choices and balanced meals  Beverages: Tea, Juice, Other, Diet soda (Juices tomato juice also drinks quite a bit of Crystal light)    Being Active:  Being Active Assessed Today: Yes  Exercise:: Unable to exercise  Barrier to exercise: Physical limitation (Chronic hip pain makes it difficult for Kelley and limits her physical activity)    Monitoring:  Monitoring Assessed Today: Yes  Did patient bring glucose meter to appointment? : Yes  Blood Glucose Meter: CGM (Freestyle wilber #2)  Times checking blood sugar at home (number): 5+  Times checking blood sugar at home (per): Day  Blood glucose trend: Decreasing    Kelley had her wilber 2 reader with her today, data was downloaded reviewed and discussed.  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                    When trying to determine what was causing the lows, suspect it may be related to Kelley administering \"a little extra insulin\" here and there when she feels she is too high.  Once again cautioned her about administering insulin at times too closely together which could cause stacking and hypoglycemia.    Taking Medications:  Diabetes Medication(s)     Insulin       insulin aspart U-100 (NOVOLOG U-100 INSULIN ASPART) " 100 unit/mL injection    [INSULIN ASPART U-100 (NOVOLOG U-100 INSULIN ASPART) 100 UNIT/ML INJECTION] Inject 20 Units under the skin 3 (three) times a day before meals. 11.9 Type 2 without complications     LANTUS U-100 INSULIN 100 unit/mL vial    [LANTUS U-100 INSULIN 100 UNIT/ML VIAL] INJECT 78 UNITS UNDER THE SKIN AT BEDTIME     insulin aspart U-100 (NOVOLOG U-100 INSULIN ASPART) 100 unit/mL injection    [INSULIN ASPART U-100 (NOVOLOG U-100 INSULIN ASPART) 100 UNIT/ML INJECTION] Inject before meals 3x daily up to 300 U / day    Incretin Mimetic Agents (GLP-1 Receptor Agonists)       Semaglutide,0.25 or 0.5MG/DOS, (OZEMPIC, 0.25 OR 0.5 MG/DOSE,) 2 MG/1.5ML SOPN    Inject 0.25 mg Subcutaneous once a week          Taking Medication Assessed Today: Yes  Current Treatments: Insulin Injections, Non-insulin Injectables  Dose schedule: Pre-breakfast, Pre-lunch, Pre-dinner  Given by: Patient  Injection/Infusion sites: Abdomen  Problems taking diabetes medications regularly?: No  Diabetes medication side effects?: No    Problem Solving:  Problem Solving Assessed Today: Yes  Is the patient at risk for hypoglycemia?: Yes  Hypoglycemia Frequency: Weekly  Hypoglycemia Treatment: Juice (Milk, keeps M&Ms at bedside)  Medical ID: No  Is the patient at risk for DKA?: No              Reducing Risks:  Reducing Risks Assessed Today: Yes  Diabetes Risks: Age over 45 years, Sedentary Lifestyle, Hyperlipidemia  CAD Risks: Diabetes Mellitus, Family history, Hypertension, Obesity, Post-menopausal, Sedentary lifestyle  Has dilated eye exam at least once a year?: Yes  Feet checked by healthcare provider in the last year?: Yes    Healthy Coping:  Healthy Coping Assessed Today: Yes  Emotional response to diabetes: Ready to learn  Stage of change: ACTION (Actively working towards change)  Support resources: In-person Offerings  Patient Activation Measure Survey Score:  No flowsheet data found.    Diabetes knowledge and skills assessment:    Patient is knowledgeable in diabetes management concepts related to: Healthy Eating, Monitoring, Taking Medication and Healthy Coping    Patient needs further education on the following diabetes management concepts: Being Active, Taking Medication and Problem Solving    Based on learning assessment above, most appropriate setting for further diabetes education would be: Individual setting.      INTERVENTIONS:    REPORTS:  See above    Education provided today on:  AADE Self-Care Behaviors:  Being Active: describe appropriate activity program  Taking Medication: action of prescribed medication, drawing up, administering and storing injectable diabetes medications, proper site selection and rotation for injections and when to take medications  Problem Solving: high blood glucose - causes, signs/symptoms, treatment and prevention, low blood glucose - causes, signs/symptoms, treatment and prevention and carrying a carbohydrate source at all times  Reducing Risks: major complications of diabetes, prevention, early diagnostic measures and treatment of complications and A1C - goals, relating to blood glucose levels, how often to check    Pt verbalized understanding of concepts discussed and recommendations provided today.       Education Materials Provided:  No new materials provided today    ASSESSMENT:  Kelley is a very pleasant 81-year-old who presents today for an office visit to review BG and assess her current diabetes management skills with A1c not at ADA goal of less than 7%.  She arrived today unaccompanied and had her CGM reader with her.  Per her report that was confirmed through documentation she is scanning to check her blood sugar several times each day-she also states she has been taking both insulins each day as prescribed with no missed or skipped doses and started Ozempic 0.25 mg/weekly this last Saturday.  She is continuing to lose weight and from our conversation today appears to be doing very well  with managing her diabetes.  Due to her hypoglycemic unawareness, the personal CGM is proving to be a very helpful invaluable tool for her daily.  Glucose Patterns & Trends:  Hypoglycemia, weekday- postmeal      PLAN  See Patient Instructions for co-developed, patient-stated behavior change goals.  AVS printed and provided to patient today. See Follow-Up section for recommended follow-up.  Encouraged her to continue scanning several times a day she has been.  Informed her that as her Ozempic dose increases she is most likely to have lesser insulin requirements and to keep close eye on her blood glucose-both before and after meals.  We will follow-up with her in approximately 5 weeks to review BG - at that time determine if Ozempic dose will need to be increased. Kelley was instructed her to call with any questions or concerns that may come up before then.    Thank you,  Sandrine Vu RN [facility]  Certified Diabetes Care and   Visit type : DSMT      Time Spent: 60 minutes  Encounter Type: Individual    Any diabetes medication dose changes were made via the CDE Protocol and Collaborative Practice Agreement with the patient's primary care provider. A copy of this encounter was shared with the provider.    Much or all of the text in this note was generated through the use of the Dragon Dictate voice-to-text software.Errors in spelling or words which seem out of context are unintentional. Sound alike errors, in particular, may have escaped editing.

## 2021-08-09 NOTE — Clinical Note
Please call with any questions and/or concerns  Thank You,  Sandrine Vu RN, BSN [facility]  Certified Diabetes Care and   [phone]

## 2021-08-09 NOTE — PATIENT INSTRUCTIONS
1. Eat 3 balanced meals each day - Monitor carb intake and limit to 45-60 grams per meal  This would be equal to 3-4 choices ~  1 choice = 15 grams    Do not wait longer than 4-5 hours to eat something  Snacks limit to no more than 30 grams of carbohydrates or 2 choices  Make sure you include protein source with each meal and at bedtime - this has been shown to help with blood glucose elevations    2. Check blood sugars several  times each day- especially keep an eye on your blood sugars after starting the Ozempic - before and after meals   Fasting and before meal target is 80 - 130   2 hours after a meal target is < 180  remember to bring meter and log book to all appointments    3. Incorporate 30 minutes activity into each day - does not need to be all at one time & walking counts    4. Take diabetes medications as prescribed Lantus 40 units daily, Novolog 15-20 units with meals and Ozempic on week #5 increase the dose to 0.5 mg weekly     Have FUN in [place] !!  And keep me posted on your blood sugars after you have done the 0.5 mg     You are doing FABULOUS !!!!! KEEP IT UP SISTER !!!

## 2021-09-12 DIAGNOSIS — E11.65 UNCONTROLLED TYPE 2 DIABETES MELLITUS WITH HYPERGLYCEMIA (H): Primary | ICD-10-CM

## 2021-09-13 ENCOUNTER — OFFICE VISIT (OUTPATIENT)
Dept: EDUCATION SERVICES | Facility: CLINIC | Age: 81
End: 2021-09-13
Payer: COMMERCIAL

## 2021-09-13 ENCOUNTER — LAB (OUTPATIENT)
Dept: LAB | Facility: CLINIC | Age: 81
End: 2021-09-13

## 2021-09-13 VITALS — BODY MASS INDEX: 30.39 KG/M2 | WEIGHT: 191.4 LBS

## 2021-09-13 DIAGNOSIS — E11.65 UNCONTROLLED TYPE 2 DIABETES MELLITUS WITH HYPERGLYCEMIA (H): Primary | ICD-10-CM

## 2021-09-13 DIAGNOSIS — E11.65 UNCONTROLLED TYPE 2 DIABETES MELLITUS WITH HYPERGLYCEMIA (H): ICD-10-CM

## 2021-09-13 LAB — HBA1C MFR BLD: 6.5 % (ref 0–5.6)

## 2021-09-13 PROCEDURE — 36415 COLL VENOUS BLD VENIPUNCTURE: CPT

## 2021-09-13 PROCEDURE — 83036 HEMOGLOBIN GLYCOSYLATED A1C: CPT

## 2021-09-13 PROCEDURE — 99207 PR NO CHARGE NURSE ONLY: CPT | Performed by: INTERNAL MEDICINE

## 2021-09-13 PROCEDURE — G0108 DIAB MANAGE TRN  PER INDIV: HCPCS | Mod: AE | Performed by: INTERNAL MEDICINE

## 2021-09-13 NOTE — PATIENT INSTRUCTIONS
1. Eat 3 balanced meals each day - Monitor carb intake and limit to 45-60 grams per meal  This would be equal to 3-4 choices ~  1 choice = 15 grams    Do not wait longer than 4-5 hours to eat something  Snacks limit to no more than 30 grams of carbohydrates or 2 choices  Make sure you include protein source with each meal and at bedtime - this has been shown to help with blood glucose elevations    2. Check blood sugars 3-4  times each day   Fasting and before meal target is 80 - 130   2 hours after a meal target is < 180  remember to bring meter and log book to all appointments    3. Incorporate 30 minutes activity into each day - does not need to be all at one time & walking counts    4. Continue Ozempic 0.5 mg weekly (Sunday) Decrease Lantus by 5 units - decrease dinner Novolog by 2-3 units

## 2021-09-13 NOTE — LETTER
9/13/2021         RE: Kelley Styles  [address], [place] [address]        Dear Colleague,    Thank you for referring your patient, Kelley Styles, to the [facility]. Please see a copy of my visit note below.    Diabetes Self-Management Education & Support    Presents for:      Type of Visit: In Person    How would patient like to obtain AVS?  AVS was provided to patient at conclusion of today's visit    ASSESSMENT:    Kelley is a very pleasant 81-year-old who presents today for review of BG data and to assess her diabetes self-management skills.  Kelley's last A1c was not quite yet at ADA goal of 7% or less-we will be rechecking that today.  She arrived today unaccompanied and had her CGM reader with her.  Per review of her reports she has been scanning her blood glucose several times each day and she states she is taking both insulins daily with no missed or skipped doses.  She is continuing to lose weight and was congratulated on this.  Blood glucose data was reviewed and discussed with Kelley during our visit today and based upon the information reviewed adjustments were made to her insulin.  Overall she is doing very very well with her diabetes management.    Patient's most recent   Lab Results   Component Value Date    A1C 6.5 09/13/2021    is meeting goal of <7.0  A1c results today were 6.5%-Kelley was congratulated on this and commended on all the progress she has made.    Diabetes knowledge and skills assessment:   Patient is knowledgeable in diabetes management concepts related to: Healthy Eating, Being Active, Monitoring, Taking Medication    Continue education with the following diabetes management concepts: Taking Medication, Problem Solving and Reducing Risks    Based on learning assessment above, most appropriate setting for further diabetes education would be: Individual setting.    INTERVENTIONS:    Education provided today on:  AADE Self-Care  Behaviors:  Healthy Eating: consistency in amount, composition, and timing of food intake, weight reduction and portion control  Being Active: describe appropriate activity program  Taking Medication: action of prescribed medication, drawing up, administering and storing injectable diabetes medications, proper site selection and rotation for injections and when to take medications  Problem Solving: low blood glucose - causes, signs/symptoms, treatment and prevention, carrying a carbohydrate source at all times and when to call health care provider  Reducing Risks: prevention, early diagnostic measures and treatment of complications, annual eye exam, A1C - goals, relating to blood glucose levels, how often to check and lipids levels and goals    Opportunities for ongoing education and support in diabetes-self management were discussed. Pt verbalized understanding of concepts discussed and recommendations provided today.       Education Materials Provided:  No new materials provided today          PLAN    Monitor: Continue to monitor BG several times each day especially a.m. fasting and before and 2 hours after meals.  Nutrition: Eat 3 balanced meals each day monitor intake of CHO and follow ADA guidelines of 45 to 60 g for meals and less than 30 g for snacks.  Medications: Based upon the BG data that was reviewed today instructed her to decrease her Lantus by 5 units and closely monitor blood glucose before meals-dose NovoLog based upon those readings.  Activity: Due to her physical limitations-chronic hip pain and limited visibility encouraged her to do what she is physically able.  Encouraged her to keep up with the chair arm exercises and try to get a short walk at least each day.  He was instructed to call with any questions or concerns that might come up and will follow up with her again in approximately 3 months.  Topics to cover at upcoming visits: Healthy Eating, Being Active, Problem Solving and Reducing  "Risks  Follow-up: November    See Goals Section for co-developed, patient-stated behavior change goals.  AVS provided to patient today.          SUBJECTIVE / OBJECTIVE:     Cultural Influences/Ethnic Background:  Not  or       Diabetes Symptoms & Complications:          Patient Problem List and Family Medical History reviewed for relevant medical history, current medical status, and diabetes risk factors.    Vitals:  Wt 86.8 kg (191 lb 6.4 oz)   BMI 30.39 kg/m    Estimated body mass index is 30.39 kg/m  as calculated from the following:    Height as of 1/12/21: 1.69 m (5' 6.54\").    Weight as of this encounter: 86.8 kg (191 lb 6.4 oz).   Last 3 BP:   BP Readings from Last 3 Encounters:   01/12/21 128/67   01/04/21 122/56   12/11/20 132/58       History   Smoking Status     Never Smoker   Smokeless Tobacco     Never Used     Comment: pt states that 50yrs+, smoked for x 1 year only       Labs:  Lab Results   Component Value Date    A1C 6.5 09/13/2021     Lab Results   Component Value Date     12/07/2020     Lab Results   Component Value Date    LDL 54 12/07/2020    LDL 82 10/14/2019     Direct Measure HDL   Date Value Ref Range Status   12/07/2020 39 (L) >=50 mg/dL Final   ]  GFR Estimate   Date Value Ref Range Status   12/07/2020 50 (L) >60 mL/min/1.73m2 Final     GFR Estimate If Black   Date Value Ref Range Status   12/07/2020 60 (L) >60 mL/min/1.73m2 Final     Lab Results   Component Value Date    CR 1.07 12/07/2020     No results found for: MICROALBUMIN    Healthy Eating:       Being Active:       Monitoring:       Glucose data:  **    These reports were reviewed and discussed with Kelley during her visit today.  Overall glycemic control looked good, however expressed my concern over the hypoglycemic events that were noted in the trend for lower blood glucose NOC.  Based on this instructed her to decrease Lantus by 5 units and increase dinner NovoLog by 2 to 3 units.  Taking " Medications:  Diabetes Medication(s)     Insulin       insulin aspart U-100 (NOVOLOG U-100 INSULIN ASPART) 100 unit/mL injection    [INSULIN ASPART U-100 (NOVOLOG U-100 INSULIN ASPART) 100 UNIT/ML INJECTION] Inject 20 Units under the skin 3 (three) times a day before meals. 11.9 Type 2 without complications     insulin aspart U-100 (NOVOLOG U-100 INSULIN ASPART) 100 unit/mL injection    [INSULIN ASPART U-100 (NOVOLOG U-100 INSULIN ASPART) 100 UNIT/ML INJECTION] Inject before meals 3x daily up to 300 U / day     LANTUS U-100 INSULIN 100 unit/mL vial    [LANTUS U-100 INSULIN 100 UNIT/ML VIAL] INJECT 78 UNITS UNDER THE SKIN AT BEDTIME    Incretin Mimetic Agents (GLP-1 Receptor Agonists)       Semaglutide,0.25 or 0.5MG/DOS, (OZEMPIC, 0.25 OR 0.5 MG/DOSE,) 2 MG/1.5ML SOPN    Inject 0.25 mg Subcutaneous once a week               Problem Solving:                 Reducing Risks:       Healthy Coping:     Patient Activation Measure Survey Score:  No flowsheet data found.          Thank you,  Sandrine Vu RN [facility]  Certified Diabetes Care and   Visit type : DSMT      Time Spent: 60 minutes  Encounter Type: Individual        Any diabetes medication dose changes were made via the Certified Diabetes Care & Education Protocol in collaboration with the patient's primary care provider. A copy of this encounter was shared with the provider.

## 2021-09-13 NOTE — PROGRESS NOTES
Diabetes Self-Management Education & Support    Presents for:      Type of Visit: In Person    How would patient like to obtain AVS?  AVS was provided to patient at conclusion of today's visit    ASSESSMENT:    Kelley is a very pleasant 81-year-old who presents today for review of BG data and to assess her diabetes self-management skills.  Kelley's last A1c was not quite yet at ADA goal of 7% or less-we will be rechecking that today.  She arrived today unaccompanied and had her CGM reader with her.  Per review of her reports she has been scanning her blood glucose several times each day and she states she is taking both insulins daily with no missed or skipped doses.  She is continuing to lose weight and was congratulated on this.  Blood glucose data was reviewed and discussed with Kelley during our visit today and based upon the information reviewed adjustments were made to her insulin.  Overall she is doing very very well with her diabetes management.    Patient's most recent   Lab Results   Component Value Date    A1C 6.5 09/13/2021    is meeting goal of <7.0  A1c results today were 6.5%-Kelley was congratulated on this and commended on all the progress she has made.    Diabetes knowledge and skills assessment:   Patient is knowledgeable in diabetes management concepts related to: Healthy Eating, Being Active, Monitoring, Taking Medication    Continue education with the following diabetes management concepts: Taking Medication, Problem Solving and Reducing Risks    Based on learning assessment above, most appropriate setting for further diabetes education would be: Individual setting.    INTERVENTIONS:    Education provided today on:  AADE Self-Care Behaviors:  Healthy Eating: consistency in amount, composition, and timing of food intake, weight reduction and portion control  Being Active: describe appropriate activity program  Taking Medication: action of prescribed medication, drawing up, administering and storing  injectable diabetes medications, proper site selection and rotation for injections and when to take medications  Problem Solving: low blood glucose - causes, signs/symptoms, treatment and prevention, carrying a carbohydrate source at all times and when to call health care provider  Reducing Risks: prevention, early diagnostic measures and treatment of complications, annual eye exam, A1C - goals, relating to blood glucose levels, how often to check and lipids levels and goals    Opportunities for ongoing education and support in diabetes-self management were discussed. Pt verbalized understanding of concepts discussed and recommendations provided today.       Education Materials Provided:  No new materials provided today          PLAN    Monitor: Continue to monitor BG several times each day especially a.m. fasting and before and 2 hours after meals.  Nutrition: Eat 3 balanced meals each day monitor intake of CHO and follow ADA guidelines of 45 to 60 g for meals and less than 30 g for snacks.  Medications: Based upon the BG data that was reviewed today instructed her to decrease her Lantus by 5 units and closely monitor blood glucose before meals-dose NovoLog based upon those readings.  Activity: Due to her physical limitations-chronic hip pain and limited visibility encouraged her to do what she is physically able.  Encouraged her to keep up with the chair arm exercises and try to get a short walk at least each day.  He was instructed to call with any questions or concerns that might come up and will follow up with her again in approximately 3 months.  Topics to cover at upcoming visits: Healthy Eating, Being Active, Problem Solving and Reducing Risks  Follow-up: November    See Goals Section for co-developed, patient-stated behavior change goals.  AVS provided to patient today.          SUBJECTIVE / OBJECTIVE:     Cultural Influences/Ethnic Background:  Not  or       Diabetes Symptoms &  "Complications:          Patient Problem List and Family Medical History reviewed for relevant medical history, current medical status, and diabetes risk factors.    Vitals:  Wt 86.8 kg (191 lb 6.4 oz)   BMI 30.39 kg/m    Estimated body mass index is 30.39 kg/m  as calculated from the following:    Height as of 1/12/21: 1.69 m (5' 6.54\").    Weight as of this encounter: 86.8 kg (191 lb 6.4 oz).   Last 3 BP:   BP Readings from Last 3 Encounters:   01/12/21 128/67   01/04/21 122/56   12/11/20 132/58       History   Smoking Status     Never Smoker   Smokeless Tobacco     Never Used     Comment: pt states that 50yrs+, smoked for x 1 year only       Labs:  Lab Results   Component Value Date    A1C 6.5 09/13/2021     Lab Results   Component Value Date     12/07/2020     Lab Results   Component Value Date    LDL 54 12/07/2020    LDL 82 10/14/2019     Direct Measure HDL   Date Value Ref Range Status   12/07/2020 39 (L) >=50 mg/dL Final   ]  GFR Estimate   Date Value Ref Range Status   12/07/2020 50 (L) >60 mL/min/1.73m2 Final     GFR Estimate If Black   Date Value Ref Range Status   12/07/2020 60 (L) >60 mL/min/1.73m2 Final     Lab Results   Component Value Date    CR 1.07 12/07/2020     No results found for: MICROALBUMIN    Healthy Eating:       Being Active:       Monitoring:       Glucose data:  **    These reports were reviewed and discussed with Kelley during her visit today.  Overall glycemic control looked good, however expressed my concern over the hypoglycemic events that were noted in the trend for lower blood glucose NOC.  Based on this instructed her to decrease Lantus by 5 units and increase dinner NovoLog by 2 to 3 units.  Taking Medications:  Diabetes Medication(s)     Insulin       insulin aspart U-100 (NOVOLOG U-100 INSULIN ASPART) 100 unit/mL injection    [INSULIN ASPART U-100 (NOVOLOG U-100 INSULIN ASPART) 100 UNIT/ML INJECTION] Inject 20 Units under the skin 3 (three) times a day before meals. 11.9 " Type 2 without complications     insulin aspart U-100 (NOVOLOG U-100 INSULIN ASPART) 100 unit/mL injection    [INSULIN ASPART U-100 (NOVOLOG U-100 INSULIN ASPART) 100 UNIT/ML INJECTION] Inject before meals 3x daily up to 300 U / day     LANTUS U-100 INSULIN 100 unit/mL vial    [LANTUS U-100 INSULIN 100 UNIT/ML VIAL] INJECT 78 UNITS UNDER THE SKIN AT BEDTIME    Incretin Mimetic Agents (GLP-1 Receptor Agonists)       Semaglutide,0.25 or 0.5MG/DOS, (OZEMPIC, 0.25 OR 0.5 MG/DOSE,) 2 MG/1.5ML SOPN    Inject 0.25 mg Subcutaneous once a week               Problem Solving:                 Reducing Risks:       Healthy Coping:     Patient Activation Measure Survey Score:  No flowsheet data found.          Thank you,  Sandrine Vu RN [facility]  Certified Diabetes Care and   Visit type : DSMT      Time Spent: 60 minutes  Encounter Type: Individual        Any diabetes medication dose changes were made via the Certified Diabetes Care & Education Protocol in collaboration with the patient's primary care provider. A copy of this encounter was shared with the provider.

## 2021-09-13 NOTE — Clinical Note
Please call with any questions and/or concerns  Thank You,  Sandrine Vu RN, BSN [facility]  Certified Diabetes Care and   [phone]

## 2021-10-01 DIAGNOSIS — E11.65 UNCONTROLLED TYPE 2 DIABETES MELLITUS WITH HYPERGLYCEMIA (H): ICD-10-CM

## 2021-10-04 ENCOUNTER — TELEPHONE (OUTPATIENT)
Dept: FAMILY MEDICINE | Facility: CLINIC | Age: 81
End: 2021-10-04

## 2021-10-04 DIAGNOSIS — E11.65 UNCONTROLLED TYPE 2 DIABETES MELLITUS WITH HYPERGLYCEMIA (H): ICD-10-CM

## 2021-10-04 NOTE — TELEPHONE ENCOUNTER
Reason for Call:  Other call back    Detailed comments: Pt stating that she [facility] her [facility] Novalog has been denied    Pt stating that PCP needs to contact [facility] for the change in dosage     Pt stating she is running low on the novalog     Please advise    Phone Number Patient can be reached at: Home number on file [phone] (home)    Best Time: ASAP    Can we leave a detailed message on this number? YES    Call taken on 10/4/2021 at 9:17 AM by Anu Muir

## 2021-11-03 ENCOUNTER — TRANSFERRED RECORDS (OUTPATIENT)
Dept: HEALTH INFORMATION MANAGEMENT | Facility: CLINIC | Age: 81
End: 2021-11-03
Payer: COMMERCIAL

## 2021-11-08 ENCOUNTER — OFFICE VISIT (OUTPATIENT)
Dept: EDUCATION SERVICES | Facility: CLINIC | Age: 81
End: 2021-11-08
Payer: COMMERCIAL

## 2021-11-08 VITALS — BODY MASS INDEX: 29.42 KG/M2 | WEIGHT: 185.3 LBS

## 2021-11-08 DIAGNOSIS — E11.65 UNCONTROLLED TYPE 2 DIABETES MELLITUS WITH HYPERGLYCEMIA (H): Primary | ICD-10-CM

## 2021-11-08 PROCEDURE — G0108 DIAB MANAGE TRN  PER INDIV: HCPCS | Mod: AE

## 2021-11-08 PROCEDURE — 99207 PR NO CHARGE NURSE ONLY: CPT

## 2021-11-08 RX ORDER — SEMAGLUTIDE 1.34 MG/ML
1 INJECTION, SOLUTION SUBCUTANEOUS WEEKLY
Qty: 3 ML | Refills: 3 | Status: SHIPPED | OUTPATIENT
Start: 2021-11-08 | End: 2022-01-21

## 2021-11-08 NOTE — PROGRESS NOTES
Diabetes Self-Management Education & Support    Presents for: Individual review    Type of Visit: In Person    How would patient like to obtain AVS?  Kelley   was provided a copy of AVS at conclusion of today's visit    ASSESSMENT:    Kelley is a very pleasant 81-year-old who is here today to review blood glucose and assess her current diabetes self-management skills.  Kelley currently has an A1c below ADA goal of 7% and she was congratulated on that.  Kelley did say today she was a little nervous about her next A1c as she had just spent the last 3 weeks traveling to several states and her eating habits were not exactly what they normally are.  She did say she enjoyed herself very much and I reassured her that a little overindulgence once in a while is okay as long as it does not turn into habitual behavior.  She is using the wilber 2 CGM system and unfortunately continues to have problems with the sensors staying affixed for the entire 14 days.  I did show her some new patches on [employer] specifically designed and made for the wilber sensors.  She reports she has been taking all medications daily with no missed or skipped doses although she did admit she has been occasionally administering an extra dose of Novolog first thing in the morning if she notices her blood glucose is high.  I advised her to stop doing this, not only is she running the risk of causing an hypoglycemic episode after giving her breakfast dose (stacking) but  is not the way we want to go about correcting an a.m. fasting elevation.  She is continuing to lose weight and I congratulated her on this today as well.    Patient's most recent   Lab Results   Component Value Date    A1C 6.5 09/13/2021    is meeting goal of <7.0    Diabetes knowledge and skills assessment:   Patient is knowledgeable in diabetes management concepts related to: Reducing Risks and Healthy Coping    Continue education with the following diabetes management concepts: Healthy Eating,  Being Active, Monitoring, Taking Medication, Problem Solving and Reducing Risks    Based on learning assessment above, most appropriate setting for further diabetes education would be: Individual setting.    INTERVENTIONS:    Education provided today on:  AADE Self-Care Behaviors:  Healthy Eating: consistency in amount, composition, and timing of food intake and portion control  Monitoring: purpose, log and interpret results, individual blood glucose targets and frequency of monitoring  Taking Medication: action of prescribed medication and when to take medications  Problem Solving: high blood glucose - causes, signs/symptoms, treatment and prevention, low blood glucose - causes, signs/symptoms, treatment and prevention, carrying a carbohydrate source at all times and safe travel  Reducing Risks: prevention, early diagnostic measures and treatment of complications and A1C - goals, relating to blood glucose levels, how often to check    Opportunities for ongoing education and support in diabetes-self management were discussed. Pt verbalized understanding of concepts discussed and recommendations provided today.       Education Materials Provided:  No new materials provided today          PLAN    Continue to monitor blood glucose using wilber reader/CGM system.  Concentrate on checking blood glucose before and approximately 2 hours after a meal and give insulins a chance to work.  Watch for low BG and if you note error trending down keep a close eye on readings.  Correct any blood glucose you see 70 or lower.  Nutrition: Eat 3 balanced meals each day, monitor intake of carbohydrates for both meals and snacks following ADA guidelines of 45 to 60 g for meals and less than 30 g for snacks.  Make sure you having a protein at each meal and at bedtime.  Medications: We will increase Ozempic to 1 mg today-advised Kelley to keep a close eye on blood glucose after she has initiated this increase as her need for Novolog may very  "well decrease.  Activity: Work on keeping active-goal being 30 minutes of moderate exercise/activity daily.  Topics to cover at upcoming visits: Healthy Eating, Being Active, Monitoring and Taking Medication  Follow-up: 3/7/2022    See Goals Section for co-developed, patient-stated behavior change goals.  AVS provided to patient today.          SUBJECTIVE / OBJECTIVE:  Presents for: Individual review  Accompanied by: Self  Diabetes education in the past 24 mo: Yes  Focus of Visit: Reducing Risks,Healthy Coping,Healthy Eating,Problem Solving,Assistance w/ making life changes,Self-care behavioral goal setting  Diabetes type: Type 2  Disease course: Improving  How confident are you filling out medical forms by yourself:: Extremely  Transportation concerns: No  Difficulty affording diabetes medication?: No  Difficulty affording diabetes testing supplies?: No  Other concerns:: Visual impairment  Cultural Influences/Ethnic Background:  Not  or       Diabetes Symptoms & Complications:  Weight trend: Decreasing  Complications assessed today?: No    Patient Problem List and Family Medical History reviewed for relevant medical history, current medical status, and diabetes risk factors.    Vitals:  Wt 84.1 kg (185 lb 4.8 oz)   BMI 29.42 kg/m    Estimated body mass index is 29.42 kg/m  as calculated from the following:    Height as of 1/12/21: 1.69 m (5' 6.54\").    Weight as of this encounter: 84.1 kg (185 lb 4.8 oz).   Last 3 BP:   BP Readings from Last 3 Encounters:   01/12/21 128/67   01/04/21 122/56   12/11/20 132/58       History   Smoking Status     Never Smoker   Smokeless Tobacco     Never Used     Comment: pt states that 50yrs+, smoked for x 1 year only       Labs:  Lab Results   Component Value Date    A1C 6.5 09/13/2021     Lab Results   Component Value Date     12/07/2020     Lab Results   Component Value Date    LDL 54 12/07/2020    LDL 82 10/14/2019     Direct Measure HDL   Date Value Ref " Range Status   12/07/2020 39 (L) >=50 mg/dL Final   ]  GFR Estimate   Date Value Ref Range Status   12/07/2020 50 (L) >60 mL/min/1.73m2 Final     GFR Estimate If Black   Date Value Ref Range Status   12/07/2020 60 (L) >60 mL/min/1.73m2 Final     Lab Results   Component Value Date    CR 1.07 12/07/2020     No results found for: MICROALBUMIN    Healthy Eating:       Being Active:       Monitoring:       Glucose data:        Her episodes of hypoglycemia were discussed with her today.  It was concluded they were occurring either by her over correcting or when she has administered insulin as a reaction to a high BG.  Again cautioned her about doing this and reiterated to her to trust the insulin to do its job.  She also reported she is not always experiencing any type of symptoms with her hypoglycemia that is why I advised her today to correct any blood glucose she notes that is 70 or less with 15 g of rapid acting carbohydrates.    Taking Medications:  Diabetes Medication(s)     Insulin       insulin aspart (NOVOLOG VIAL) 100 UNITS/ML vial    Inject 35 Units Subcutaneous 3 times daily (with meals)     insulin aspart U-100 (NOVOLOG U-100 INSULIN ASPART) 100 unit/mL injection    [INSULIN ASPART U-100 (NOVOLOG U-100 INSULIN ASPART) 100 UNIT/ML INJECTION] Inject 20 Units under the skin 3 (three) times a day before meals. 11.9 Type 2 without complications     LANTUS U-100 INSULIN 100 unit/mL vial    [LANTUS U-100 INSULIN 100 UNIT/ML VIAL] INJECT 78 UNITS UNDER THE SKIN AT BEDTIME    Incretin Mimetic Agents (GLP-1 Receptor Agonists)       Semaglutide, 1 MG/DOSE, (OZEMPIC, 1 MG/DOSE,) 4 MG/3ML SOPN    Inject 1 mg Subcutaneous once a week     Semaglutide,0.25 or 0.5MG/DOS, (OZEMPIC, 0.25 OR 0.5 MG/DOSE,) 2 MG/1.5ML SOPN    Inject 0.25 mg Subcutaneous once a week               Problem Solving:                 Reducing Risks:       Healthy Coping:     Patient Activation Measure Survey Score:  No flowsheet data  found.          Thank you,  Sandrine Vu RN [facility]  Certified Diabetes Care and   Visit type : DSMT      Time Spent: 60 minutes  Encounter Type: Individual        Any diabetes medication dose changes were made via the Certified Diabetes Care & Education Protocol in collaboration with the patient's referring provider and primary care provider. A copy of this encounter was shared with the provider.  Much or all of the text in this note was generated through the use of the Dragon Dictate voice-to-text software.Errors in spelling or words which seem out of context are unintentional. Sound alike errors, in particular, may have escaped editing.

## 2021-11-08 NOTE — LETTER
11/8/2021         RE: Kelley Styles  [address] [address]        Dear Colleague,    Thank you for referring your patient, Kelley Styles, to the [facility]. Please see a copy of my visit note below.    Diabetes Self-Management Education & Support    Presents for: Individual review    Type of Visit: In Person    How would patient like to obtain AVS?  Kelley   was provided a copy of AVS at conclusion of today's visit    ASSESSMENT:    Kelley is a very pleasant 81-year-old who is here today to review blood glucose and assess her current diabetes self-management skills.  Kelley currently has an A1c below ADA goal of 7% and she was congratulated on that.  Kelley did say today she was a little nervous about her next A1c as she had just spent the last 3 weeks traveling to several states and her eating habits were not exactly what they normally are.  She did say she enjoyed herself very much and I reassured her that a little overindulgence once in a while is okay as long as it does not turn into habitual behavior.  She is using the wilber 2 CGM system and unfortunately continues to have problems with the sensors staying affixed for the entire 14 days.  I did show her some new patches on [employer] specifically designed and made for the wilber sensors.  She reports she has been taking all medications daily with no missed or skipped doses although she did admit she has been occasionally administering an extra dose of Novolog first thing in the morning if she notices her blood glucose is high.  I advised her to stop doing this, not only is she running the risk of causing an hypoglycemic episode after giving her breakfast dose (stacking) but  is not the way we want to go about correcting an a.m. fasting elevation.  She is continuing to lose weight and I congratulated her on this today as well.    Patient's most recent   Lab Results   Component Value Date    A1C 6.5 09/13/2021     is meeting goal of <7.0    Diabetes knowledge and skills assessment:   Patient is knowledgeable in diabetes management concepts related to: Reducing Risks and Healthy Coping    Continue education with the following diabetes management concepts: Healthy Eating, Being Active, Monitoring, Taking Medication, Problem Solving and Reducing Risks    Based on learning assessment above, most appropriate setting for further diabetes education would be: Individual setting.    INTERVENTIONS:    Education provided today on:  AADE Self-Care Behaviors:  Healthy Eating: consistency in amount, composition, and timing of food intake and portion control  Monitoring: purpose, log and interpret results, individual blood glucose targets and frequency of monitoring  Taking Medication: action of prescribed medication and when to take medications  Problem Solving: high blood glucose - causes, signs/symptoms, treatment and prevention, low blood glucose - causes, signs/symptoms, treatment and prevention, carrying a carbohydrate source at all times and safe travel  Reducing Risks: prevention, early diagnostic measures and treatment of complications and A1C - goals, relating to blood glucose levels, how often to check    Opportunities for ongoing education and support in diabetes-self management were discussed. Pt verbalized understanding of concepts discussed and recommendations provided today.       Education Materials Provided:  No new materials provided today          PLAN    Continue to monitor blood glucose using wilber reader/CGM system.  Concentrate on checking blood glucose before and approximately 2 hours after a meal and give insulins a chance to work.  Watch for low BG and if you note error trending down keep a close eye on readings.  Correct any blood glucose you see 70 or lower.  Nutrition: Eat 3 balanced meals each day, monitor intake of carbohydrates for both meals and snacks following ADA guidelines of 45 to 60 g for meals and  "less than 30 g for snacks.  Make sure you having a protein at each meal and at bedtime.  Medications: We will increase Ozempic to 1 mg today-advised Kelley to keep a close eye on blood glucose after she has initiated this increase as her need for Novolog may very well decrease.  Activity: Work on keeping active-goal being 30 minutes of moderate exercise/activity daily.  Topics to cover at upcoming visits: Healthy Eating, Being Active, Monitoring and Taking Medication  Follow-up: 3/7/2022    See Goals Section for co-developed, patient-stated behavior change goals.  AVS provided to patient today.          SUBJECTIVE / OBJECTIVE:  Presents for: Individual review  Accompanied by: Self  Diabetes education in the past 24 mo: Yes  Focus of Visit: Reducing Risks,Healthy Coping,Healthy Eating,Problem Solving,Assistance w/ making life changes,Self-care behavioral goal setting  Diabetes type: Type 2  Disease course: Improving  How confident are you filling out medical forms by yourself:: Extremely  Transportation concerns: No  Difficulty affording diabetes medication?: No  Difficulty affording diabetes testing supplies?: No  Other concerns:: Visual impairment  Cultural Influences/Ethnic Background:  Not  or       Diabetes Symptoms & Complications:  Weight trend: Decreasing  Complications assessed today?: No    Patient Problem List and Family Medical History reviewed for relevant medical history, current medical status, and diabetes risk factors.    Vitals:  Wt 84.1 kg (185 lb 4.8 oz)   BMI 29.42 kg/m    Estimated body mass index is 29.42 kg/m  as calculated from the following:    Height as of 1/12/21: 1.69 m (5' 6.54\").    Weight as of this encounter: 84.1 kg (185 lb 4.8 oz).   Last 3 BP:   BP Readings from Last 3 Encounters:   01/12/21 128/67   01/04/21 122/56   12/11/20 132/58       History   Smoking Status     Never Smoker   Smokeless Tobacco     Never Used     Comment: pt states that 50yrs+, smoked for x 1 " year only       Labs:  Lab Results   Component Value Date    A1C 6.5 09/13/2021     Lab Results   Component Value Date     12/07/2020     Lab Results   Component Value Date    LDL 54 12/07/2020    LDL 82 10/14/2019     Direct Measure HDL   Date Value Ref Range Status   12/07/2020 39 (L) >=50 mg/dL Final   ]  GFR Estimate   Date Value Ref Range Status   12/07/2020 50 (L) >60 mL/min/1.73m2 Final     GFR Estimate If Black   Date Value Ref Range Status   12/07/2020 60 (L) >60 mL/min/1.73m2 Final     Lab Results   Component Value Date    CR 1.07 12/07/2020     No results found for: MICROALBUMIN    Healthy Eating:       Being Active:       Monitoring:       Glucose data:        Her episodes of hypoglycemia were discussed with her today.  It was concluded they were occurring either by her over correcting or when she has administered insulin as a reaction to a high BG.  Again cautioned her about doing this and reiterated to her to trust the insulin to do its job.  She also reported she is not always experiencing any type of symptoms with her hypoglycemia that is why I advised her today to correct any blood glucose she notes that is 70 or less with 15 g of rapid acting carbohydrates.    Taking Medications:  Diabetes Medication(s)     Insulin       insulin aspart (NOVOLOG VIAL) 100 UNITS/ML vial    Inject 35 Units Subcutaneous 3 times daily (with meals)     insulin aspart U-100 (NOVOLOG U-100 INSULIN ASPART) 100 unit/mL injection    [INSULIN ASPART U-100 (NOVOLOG U-100 INSULIN ASPART) 100 UNIT/ML INJECTION] Inject 20 Units under the skin 3 (three) times a day before meals. 11.9 Type 2 without complications     LANTUS U-100 INSULIN 100 unit/mL vial    [LANTUS U-100 INSULIN 100 UNIT/ML VIAL] INJECT 78 UNITS UNDER THE SKIN AT BEDTIME    Incretin Mimetic Agents (GLP-1 Receptor Agonists)       Semaglutide, 1 MG/DOSE, (OZEMPIC, 1 MG/DOSE,) 4 MG/3ML SOPN    Inject 1 mg Subcutaneous once a week     Semaglutide,0.25 or  0.5MG/DOS, (OZEMPIC, 0.25 OR 0.5 MG/DOSE,) 2 MG/1.5ML SOPN    Inject 0.25 mg Subcutaneous once a week               Problem Solving:                 Reducing Risks:       Healthy Coping:     Patient Activation Measure Survey Score:  No flowsheet data found.          Thank you,  Sandrine Vu RN [facility]  Certified Diabetes Care and   Visit type : DSMT      Time Spent: 60 minutes  Encounter Type: Individual        Any diabetes medication dose changes were made via the Certified Diabetes Care & Education Protocol in collaboration with the patient's referring provider and primary care provider. A copy of this encounter was shared with the provider.  Much or all of the text in this note was generated through the use of the Dragon Dictate voice-to-text software.Errors in spelling or words which seem out of context are unintentional. Sound alike errors, in particular, may have escaped editing.

## 2021-11-08 NOTE — PATIENT INSTRUCTIONS
1. Eat 3 balanced meals each day - Monitor carb intake and limit to 45-60 grams per meal  This would be equal to 3-4 choices ~  1 choice = 15 grams    Do not wait longer than 4-5 hours to eat something  Snacks limit to no more than 30 grams of carbohydrates or 2 choices  Make sure you include protein source with each meal and at bedtime - this has been shown to help with blood glucose elevations    2. Check blood sugars several  times each day - you are doing well with this - keep it up !!!!   Fasting and before meal target is 90 - 150   2 hours after a meal target is < 180  remember to bring meter and log book to all appointments    3. Incorporate 30 minutes activity into each day - does not need to be all at one time & walking counts    4. Take diabetes medications as prescribed We're increasing Ozempic to 1 mg weekly since we are doing this leave Lantus dose alone and keep an eye on your post meal numbers because I am suspecting you will be needing less Novolog      Keep doing what your doing !!! YOU ARE AWESOME !!!!

## 2021-12-10 ENCOUNTER — OFFICE VISIT (OUTPATIENT)
Dept: FAMILY MEDICINE | Facility: CLINIC | Age: 81
End: 2021-12-10
Payer: COMMERCIAL

## 2021-12-10 ENCOUNTER — TELEPHONE (OUTPATIENT)
Dept: FAMILY MEDICINE | Facility: CLINIC | Age: 81
End: 2021-12-10

## 2021-12-10 VITALS
SYSTOLIC BLOOD PRESSURE: 122 MMHG | BODY MASS INDEX: 30.01 KG/M2 | WEIGHT: 189 LBS | DIASTOLIC BLOOD PRESSURE: 64 MMHG | OXYGEN SATURATION: 98 % | HEART RATE: 80 BPM

## 2021-12-10 DIAGNOSIS — L65.9 HAIR LOSS: ICD-10-CM

## 2021-12-10 DIAGNOSIS — H54.8 LEGAL BLINDNESS: ICD-10-CM

## 2021-12-10 DIAGNOSIS — N93.9 VAGINAL SPOTTING: ICD-10-CM

## 2021-12-10 DIAGNOSIS — E11.65 UNCONTROLLED TYPE 2 DIABETES MELLITUS WITH HYPERGLYCEMIA (H): Primary | ICD-10-CM

## 2021-12-10 DIAGNOSIS — Z02.89 ENCOUNTER FOR COMPLETION OF FORM WITH PATIENT: ICD-10-CM

## 2021-12-10 LAB
ANION GAP SERPL CALCULATED.3IONS-SCNC: 9 MMOL/L (ref 5–18)
BUN SERPL-MCNC: 14 MG/DL (ref 8–28)
CALCIUM SERPL-MCNC: 9.9 MG/DL (ref 8.5–10.5)
CHLORIDE BLD-SCNC: 103 MMOL/L (ref 98–107)
CO2 SERPL-SCNC: 28 MMOL/L (ref 22–31)
CREAT SERPL-MCNC: 0.89 MG/DL (ref 0.6–1.1)
ERYTHROCYTE [DISTWIDTH] IN BLOOD BY AUTOMATED COUNT: 12.7 % (ref 10–15)
GFR SERPL CREATININE-BSD FRML MDRD: 62 ML/MIN/1.73M2
GLUCOSE BLD-MCNC: 185 MG/DL (ref 70–125)
HBA1C MFR BLD: 6.4 % (ref 0–5.6)
HCT VFR BLD AUTO: 46 % (ref 35–47)
HGB BLD-MCNC: 14.9 G/DL (ref 11.7–15.7)
HOLD SPECIMEN: NORMAL
HOLD SPECIMEN: NORMAL
MCH RBC QN AUTO: 30.9 PG (ref 26.5–33)
MCHC RBC AUTO-ENTMCNC: 32.4 G/DL (ref 31.5–36.5)
MCV RBC AUTO: 95 FL (ref 78–100)
PLATELET # BLD AUTO: 279 10E3/UL (ref 150–450)
POTASSIUM BLD-SCNC: 4.5 MMOL/L (ref 3.5–5)
RBC # BLD AUTO: 4.82 10E6/UL (ref 3.8–5.2)
SODIUM SERPL-SCNC: 140 MMOL/L (ref 136–145)
TSH SERPL DL<=0.005 MIU/L-ACNC: 1.87 UIU/ML (ref 0.3–5)
WBC # BLD AUTO: 8 10E3/UL (ref 4–11)

## 2021-12-10 PROCEDURE — 85027 COMPLETE CBC AUTOMATED: CPT | Performed by: STUDENT IN AN ORGANIZED HEALTH CARE EDUCATION/TRAINING PROGRAM

## 2021-12-10 PROCEDURE — 80048 BASIC METABOLIC PNL TOTAL CA: CPT | Performed by: STUDENT IN AN ORGANIZED HEALTH CARE EDUCATION/TRAINING PROGRAM

## 2021-12-10 PROCEDURE — 36415 COLL VENOUS BLD VENIPUNCTURE: CPT | Performed by: STUDENT IN AN ORGANIZED HEALTH CARE EDUCATION/TRAINING PROGRAM

## 2021-12-10 PROCEDURE — 84443 ASSAY THYROID STIM HORMONE: CPT | Performed by: STUDENT IN AN ORGANIZED HEALTH CARE EDUCATION/TRAINING PROGRAM

## 2021-12-10 PROCEDURE — 83036 HEMOGLOBIN GLYCOSYLATED A1C: CPT | Performed by: STUDENT IN AN ORGANIZED HEALTH CARE EDUCATION/TRAINING PROGRAM

## 2021-12-10 PROCEDURE — 99214 OFFICE O/P EST MOD 30 MIN: CPT | Performed by: STUDENT IN AN ORGANIZED HEALTH CARE EDUCATION/TRAINING PROGRAM

## 2021-12-10 RX ORDER — BRIMONIDINE TARTRATE, TIMOLOL MALEATE 2; 5 MG/ML; MG/ML
SOLUTION/ DROPS OPHTHALMIC
COMMUNITY
Start: 2021-08-02

## 2021-12-10 NOTE — TELEPHONE ENCOUNTER
Forms received and completed during visit.     [employer] forms copy in faxed out bin. Will send to medical records in 3 weeks.
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
## 2021-12-13 ENCOUNTER — PATIENT OUTREACH (OUTPATIENT)
Dept: CARE COORDINATION | Facility: CLINIC | Age: 81
End: 2021-12-13
Payer: COMMERCIAL

## 2021-12-13 PROBLEM — N93.9 VAGINAL SPOTTING: Status: ACTIVE | Noted: 2021-12-13

## 2021-12-13 NOTE — PROGRESS NOTES
Clinic Care Coordination Contact  [facility]       Clinical Data: Care Coordinator Outreach  Outreach attempted x 2. CHW attempted to reach patient today, called and phone stated patient was not accepting calls today. CHW was unable to leave vm  .  Plan: Care Coordinator sent care coordination introduction letter on 12/14/2021 via [employer]. Care Coordinator will do no further outreaches at this time.    Clinic Care Coordination Contact  [facility]       Clinical Data: Care Coordinator Outreach  Outreach attempted x 1.  No answer or vm option at CHW call today     Care Coordinator will try to reach patient again in 1-2 business days.  12/14

## 2021-12-13 NOTE — LETTER
[facility] CARE COORDINATION  [address] [place] [address]    December 14, 2021    Kelley Styles  [address] [address]      Dear Kelley,    I am a clinic community health worker who works with Harmony Ireland DO at [facility]. I have been trying to reach you recently to introduce Clinic Care Coordination and to see if there was anything I could assist you with.  Below is a description of clinic care coordination and how I can further assist you.      The clinic care coordination team is made up of a registered nurse,  and community health worker who understand the health care system. The goal of clinic care coordination is to help you manage your health and improve access to the health care system in the most efficient manner. The team can assist you in meeting your health care goals by providing education, coordinating services, strengthening the communication among your providers and supporting you with any resource needs.    Please feel free to contact me at [phone] with any questions or concerns. We are focused on providing you with the highest-quality healthcare experience possible and that all starts with you.     Sincerely,   [employer]

## 2021-12-20 ENCOUNTER — MYC MEDICAL ADVICE (OUTPATIENT)
Dept: FAMILY MEDICINE | Facility: CLINIC | Age: 81
End: 2021-12-20
Payer: COMMERCIAL

## 2021-12-20 DIAGNOSIS — E11.65 UNCONTROLLED TYPE 2 DIABETES MELLITUS WITH HYPERGLYCEMIA (H): ICD-10-CM

## 2021-12-20 RX ORDER — INSULIN GLARGINE 100 [IU]/ML
50 INJECTION, SOLUTION SUBCUTANEOUS AT BEDTIME
Qty: 90 ML | Refills: 3 | Status: SHIPPED | OUTPATIENT
Start: 2021-12-20 | End: 2022-12-19

## 2021-12-23 ENCOUNTER — TELEPHONE (OUTPATIENT)
Dept: FAMILY MEDICINE | Facility: CLINIC | Age: 81
End: 2021-12-23

## 2021-12-23 NOTE — TELEPHONE ENCOUNTER
Reason for Call:  Other call back    Detailed comments: Per patient pharrmacy [facility] states that the Novalog needs PA to be done     Please advise    Pt states she has never had to do a PA before for the Novalog      Phone Number Patient can be reached at: Home number on file [phone] (home)    Best Time: anytime    Can we leave a detailed message on this number? YES    Call taken on 12/23/2021 at 8:34 AM by Anu Muir

## 2021-12-23 NOTE — TELEPHONE ENCOUNTER
Prior Authorization Not Needed per Insurance    Medication: NOVOLOG VIALS  Insurance Company: [facility] - Phone [phone] Fax [phone]  Expected CoPay:      Pharmacy Filling the Rx: [facility] - [address]  Pharmacy Notified: Yes  Patient Notified: No    Spoke to a rep with [facility] order pharmacy [phone] and confirmed they have a paid claim for this medication, no PA needed.   This was processed through insurance but patient still has copay of $149.96.    Patient may have deductible she has to meet or is in the donut hole if she has medicare.

## 2021-12-23 NOTE — TELEPHONE ENCOUNTER
Central Prior Authorization Team   Phone: [phone]    PA Initiation    Medication: NOVOLOG VIALS  Insurance Company: [employer] - Phone [phone] Fax [phone]  Pharmacy Filling the Rx: [facility] - [address]  Filling Pharmacy Phone: [phone]  Filling Pharmacy Fax:    Start Date: 12/23/2021

## 2022-01-21 DIAGNOSIS — E11.65 UNCONTROLLED TYPE 2 DIABETES MELLITUS WITH HYPERGLYCEMIA (H): ICD-10-CM

## 2022-01-21 RX ORDER — SEMAGLUTIDE 1.34 MG/ML
1 INJECTION, SOLUTION SUBCUTANEOUS WEEKLY
Qty: 3 ML | Refills: 3 | Status: SHIPPED | OUTPATIENT
Start: 2022-01-21 | End: 2022-03-28 | Stop reason: SINTOL

## 2022-01-21 NOTE — TELEPHONE ENCOUNTER
Reason for Call:  Medication or medication refill:    Do you use a [facility]?  Name [facility] the [facility] the [facility]    Name of the medication requested:   Pt is requesting a 90 days script be send to mail order please.    Semaglutide, 1 MG/DOSE, (OZEMPIC, 1 MG/DOSE,) 4 MG/3ML SOPN 3 mL 3 11/8/2021  --   Sig - Route: Inject 1 mg Subcutaneous once a week - Subcutaneous         Other request: na    Can we leave a detailed message on this number? YES    Phone number patient can be reached at: Cell number on file:    Telephone Information:   Mobile [phone]       Best Time: any    Call taken on 1/21/2022 at 12:31 PM by Pam J. Behr

## 2022-02-09 DIAGNOSIS — E78.00 HYPERCHOLESTEROLEMIA: ICD-10-CM

## 2022-02-09 DIAGNOSIS — Z76.0 ENCOUNTER FOR MEDICATION REFILL: Primary | ICD-10-CM

## 2022-02-09 RX ORDER — ATORVASTATIN CALCIUM 40 MG/1
TABLET, FILM COATED ORAL
Qty: 90 TABLET | Refills: 3 | Status: SHIPPED | OUTPATIENT
Start: 2022-02-09 | End: 2023-02-20

## 2022-02-13 ENCOUNTER — HEALTH MAINTENANCE LETTER (OUTPATIENT)
Age: 82
End: 2022-02-13

## 2022-02-18 DIAGNOSIS — I10 ESSENTIAL HYPERTENSION: ICD-10-CM

## 2022-02-21 RX ORDER — LISINOPRIL/HYDROCHLOROTHIAZIDE 10-12.5 MG
TABLET ORAL
Qty: 90 TABLET | Refills: 3 | Status: SHIPPED | OUTPATIENT
Start: 2022-02-21 | End: 2023-09-08

## 2022-02-21 NOTE — TELEPHONE ENCOUNTER
"Last Written Prescription Date:  1/22/2021  Last Fill Quantity: 90,  # refills: 3   Last office visit provider:  12/10/2021     Requested Prescriptions   Pending Prescriptions Disp Refills     lisinopril-hydrochlorothiazide (ZESTORETIC) 10-12.5 MG tablet [Pharmacy Med Name: LISINOPRIL/HCTZ TABS 10/12.5MG] 90 tablet 3     Sig: TAKE 1 TABLET BEFORE BREAKFAST       Diuretics (Including Combos) Protocol Passed - 2/18/2022  5:08 PM        Passed - Blood pressure under 140/90 in past 12 months     BP Readings from Last 3 Encounters:   12/10/21 122/64   01/12/21 128/67   01/04/21 122/56                 Passed - Recent (12 mo) or future (30 days) visit within the authorizing provider's specialty     Patient has had an office visit with the authorizing provider or a provider within the authorizing providers department within the previous 12 mos or has a future within next 30 days. See \"Patient Info\" tab in inbasket, or \"Choose Columns\" in Meds & Orders section of the refill encounter.              Passed - Medication is active on med list        Passed - Patient is age 18 or older        Passed - No active pregancy on record        Passed - Normal serum creatinine on file in past 12 months     Recent Labs   Lab Test 12/10/21  1120   CR 0.89              Passed - Normal serum potassium on file in past 12 months     Recent Labs   Lab Test 12/10/21  1120   POTASSIUM 4.5                    Passed - Normal serum sodium on file in past 12 months     Recent Labs   Lab Test 12/10/21  1120                 Passed - No positive pregnancy test in past 12 months       ACE Inhibitors (Including Combos) Protocol Passed - 2/18/2022  5:08 PM        Passed - Blood pressure under 140/90 in past 12 months     BP Readings from Last 3 Encounters:   12/10/21 122/64   01/12/21 128/67   01/04/21 122/56                 Passed - Recent (12 mo) or future (30 days) visit within the authorizing provider's specialty     Patient has had an office " "visit with the authorizing provider or a provider within the authorizing providers department within the previous 12 mos or has a future within next 30 days. See \"Patient Info\" tab in inbasket, or \"Choose Columns\" in Meds & Orders section of the refill encounter.              Passed - Medication is active on med list        Passed - Patient is age 18 or older        Passed - No active pregnancy on record        Passed - Normal serum creatinine on file in past 12 months     Recent Labs   Lab Test 12/10/21  1120   CR 0.89       Ok to refill medication if creatinine is low          Passed - Normal serum potassium on file in past 12 months     Recent Labs   Lab Test 12/10/21  1120   POTASSIUM 4.5             Passed - No positive pregnancy test within past 12 months             Bridget Garcia RN 02/21/22 11:32 AM  "

## 2022-03-23 ENCOUNTER — TELEPHONE (OUTPATIENT)
Dept: FAMILY MEDICINE | Facility: CLINIC | Age: 82
End: 2022-03-23
Payer: COMMERCIAL

## 2022-03-23 DIAGNOSIS — E11.65 UNCONTROLLED TYPE 2 DIABETES MELLITUS WITH HYPERGLYCEMIA (H): Primary | ICD-10-CM

## 2022-03-23 DIAGNOSIS — E11.9 TYPE 2 DIABETES MELLITUS WITHOUT COMPLICATION, WITH LONG-TERM CURRENT USE OF INSULIN (H): ICD-10-CM

## 2022-03-23 DIAGNOSIS — Z79.4 TYPE 2 DIABETES MELLITUS WITHOUT COMPLICATION, WITH LONG-TERM CURRENT USE OF INSULIN (H): ICD-10-CM

## 2022-03-23 NOTE — TELEPHONE ENCOUNTER
Reason for Call: Request for an order or referral:    Order or referral being requested:   A1C    Date needed: as soon as possible    Has the patient been seen by the PCP for this problem? YES    Additional comments: Pt has an appt with Diabetic Educator on 03/28/2022, would like to have this done prior    Phone number Patient can be reached at:  Home number on file [phone] (home)    Best Time:  anytime    Can we leave a detailed message on this number?  YES    Call taken on 3/23/2022 at 11:21 AM by Anu Muir

## 2022-03-28 ENCOUNTER — LAB (OUTPATIENT)
Dept: LAB | Facility: CLINIC | Age: 82
End: 2022-03-28

## 2022-03-28 ENCOUNTER — OFFICE VISIT (OUTPATIENT)
Dept: EDUCATION SERVICES | Facility: CLINIC | Age: 82
End: 2022-03-28
Payer: COMMERCIAL

## 2022-03-28 ENCOUNTER — ANCILLARY PROCEDURE (OUTPATIENT)
Dept: MAMMOGRAPHY | Facility: CLINIC | Age: 82
End: 2022-03-28
Attending: STUDENT IN AN ORGANIZED HEALTH CARE EDUCATION/TRAINING PROGRAM
Payer: COMMERCIAL

## 2022-03-28 VITALS — BODY MASS INDEX: 28.58 KG/M2 | WEIGHT: 180 LBS

## 2022-03-28 DIAGNOSIS — Z12.31 VISIT FOR SCREENING MAMMOGRAM: ICD-10-CM

## 2022-03-28 DIAGNOSIS — E11.9 TYPE 2 DIABETES MELLITUS WITHOUT COMPLICATION, WITH LONG-TERM CURRENT USE OF INSULIN (H): ICD-10-CM

## 2022-03-28 DIAGNOSIS — E11.65 UNCONTROLLED TYPE 2 DIABETES MELLITUS WITH HYPERGLYCEMIA (H): ICD-10-CM

## 2022-03-28 DIAGNOSIS — Z79.4 TYPE 2 DIABETES MELLITUS WITHOUT COMPLICATION, WITH LONG-TERM CURRENT USE OF INSULIN (H): Primary | ICD-10-CM

## 2022-03-28 DIAGNOSIS — Z79.4 TYPE 2 DIABETES MELLITUS WITHOUT COMPLICATION, WITH LONG-TERM CURRENT USE OF INSULIN (H): ICD-10-CM

## 2022-03-28 DIAGNOSIS — E11.9 TYPE 2 DIABETES MELLITUS WITHOUT COMPLICATION, WITH LONG-TERM CURRENT USE OF INSULIN (H): Primary | ICD-10-CM

## 2022-03-28 LAB — HBA1C MFR BLD: 5.9 % (ref 0–5.6)

## 2022-03-28 PROCEDURE — 83036 HEMOGLOBIN GLYCOSYLATED A1C: CPT

## 2022-03-28 PROCEDURE — 36415 COLL VENOUS BLD VENIPUNCTURE: CPT

## 2022-03-28 PROCEDURE — G0108 DIAB MANAGE TRN  PER INDIV: HCPCS

## 2022-03-28 PROCEDURE — 99207 PR NO CHARGE NURSE ONLY: CPT

## 2022-03-28 PROCEDURE — 77067 SCR MAMMO BI INCL CAD: CPT | Mod: TC | Performed by: RADIOLOGY

## 2022-03-28 RX ORDER — SEMAGLUTIDE 1.34 MG/ML
0.5 INJECTION, SOLUTION SUBCUTANEOUS WEEKLY
Qty: 6 MG | Refills: 2 | Status: SHIPPED | OUTPATIENT
Start: 2022-03-28 | End: 2022-12-19

## 2022-03-28 NOTE — PROGRESS NOTES
Diabetes Self-Management Education & Support    Presents for: Individual review    Type of Visit: In Person    How would patient like to obtain AVS? Kelley Styles was provided a copy of AVS at conclusion of todays visit.    ASSESSMENT:    Kelley is a very pleasant 81-year-old who comes to clinic today for routine office visit to recheck A1c and to assess/assess her current diabetes self-management skills.  She arrived today unaccompanied and had her [employer] 2 CGM reader with her.  Per her report she has been taking both Lantus and Novolog daily as prescribed with no missed or skipped doses-because she started experiencing constipation and uncomfortably hard stools after increasing Ozempic to 1 mg did not take it this week.  We discussed this and she agreed to lower dose to 0.5 mg to see if things improved with the lower dose.  A1c was rechecked today and was very happy to see it is now at 5.9%!  Congratulated her and commended her on the progress she has made.    I do have some concerns however as to the hypoglycemic events she has been having that were noted today.  This is most often occurring in the evenings and she states she does not feel symptomatic at all.  Usually she will get a low blood sugar when she scans.  Suspect one of the causative factors is she is over correcting when dosing.  Advised her to make sure she is checking her blood glucose before she eats and dosing based upon both her pre meal number as well as her intake.  I also advised her today to try and keep Lantus at a consistent dose.   She continues to lose weight and overall says she feels good.  Will be taking a trip with her brother to [place] in the next couple of weeks and is looking forward to it.  Patient's most recent   Lab Results   Component Value Date    A1C 5.9 03/28/2022    is meeting goal of <7.0    Diabetes knowledge and skills assessment:   Patient is knowledgeable in diabetes management concepts related to: Healthy Eating,  Monitoring and Healthy Coping    Continue education with the following diabetes management concepts: Healthy Eating, Being Active, Monitoring, Taking Medication, Problem Solving, Reducing Risks and Healthy Coping    Based on learning assessment above, most appropriate setting for further diabetes education would be: Individual setting.    INTERVENTIONS:    Education provided today on:  AADE Self-Care Behaviors:  Healthy Eating: carbohydrate counting, consistency in amount, composition, and timing of food intake, portion control and label reading  Being Active: describe appropriate activity program and precautions to take  Monitoring: individual blood glucose targets and frequency of monitoring  Taking Medication: action of prescribed medication, proper site selection and rotation for injections, side effects of prescribed medications and when to take medications  Problem Solving: high blood glucose - causes, signs/symptoms, treatment and prevention, low blood glucose - causes, signs/symptoms, treatment and prevention and carrying a carbohydrate source at all times  Reducing Risks: prevention, early diagnostic measures and treatment of complications, annual eye exam and A1C - goals, relating to blood glucose levels, how often to check  Healthy Coping: benefits of making appropriate lifestyle changes and utilize support systems    Opportunities for ongoing education and support in diabetes-self management were discussed. Pt verbalized understanding of concepts discussed and recommendations provided today.             Goals Addressed as of 3/28/2022 at 1:58 PM                 Today    11/8/21      Changes in Lifestyle   Not on track  On track    Added 8/9/21 by Sandrine Cornell RN     Activity  - work on increasing activity daily - try and do chair exercises with bands at least 3 times each week 8/9/2021          Have 3 meals a day   On track  On track    Added 8/9/21 by Sandrine Cornell RN     Eat 3 balanced meals  "each day - Monitor carb intake and limit to 45-60 grams per meal  This would be equal to 3-4 choices ~  1 choice = 15 grams  Snacks no more than 30 grams or 2 choices            PLAN  Monitor blood glucose several times daily.  Kelley was reminded to bring meter/log book to all follow-up appointments in clinic.  Nutrition: Eat 3 balanced meals daily, following parameters set by ADA guidelines for intake of carbohydrates for both meals and snacks.  Remember to include a protein with each meal and at bedtime.  Medications: Continue Lantus 50 units SC daily and 25 units Novolog with meals.  Will decrease Ozempic to 0.5 mg SC weekly  Activity: Work on increasing activity while remaining safe.    Topics to cover at upcoming visits: Healthy Eating, Being Active, Monitoring, Taking Medication, Problem Solving and Healthy Coping    Follow-up: 6 months scheduled for September 12    See Goals Section for co-developed, patient-stated behavior change goals.  AVS provided to patient today.          SUBJECTIVE / OBJECTIVE:  Presents for: Individual review  Accompanied by: Self  Diabetes education in the past 24 mo: Yes  Focus of Visit: Assistance w/ making life changes, Self-care behavioral goal setting, Taking Medication, Healthy Coping  Diabetes type: Type 2  Disease course: Improving  Transportation concerns: No  Difficulty affording diabetes medication?: No  Difficulty affording diabetes testing supplies?: No  Other concerns:: Visual impairment  Cultural Influences/Ethnic Background:  Not  or       Diabetes Symptoms & Complications:  Fatigue: Sometimes       Patient Problem List and Family Medical History reviewed for relevant medical history, current medical status, and diabetes risk factors.    Vitals:  Wt 81.6 kg (180 lb)   BMI 28.58 kg/m    Estimated body mass index is 28.58 kg/m  as calculated from the following:    Height as of 1/12/21: 1.69 m (5' 6.54\").    Weight as of this encounter: 81.6 kg (180 lb). "   Last 3 BP:   BP Readings from Last 3 Encounters:   12/10/21 122/64   01/12/21 128/67   01/04/21 122/56       History   Smoking Status     Never Smoker   Smokeless Tobacco     Never Used     Comment: pt states that 50yrs+, smoked for x 1 year only       Labs:  Lab Results   Component Value Date    A1C 5.9 03/28/2022     Lab Results   Component Value Date     12/10/2021     Lab Results   Component Value Date    LDL 54 12/07/2020    LDL 82 10/14/2019     Direct Measure HDL   Date Value Ref Range Status   12/07/2020 39 (L) >=50 mg/dL Final   ]  GFR Estimate   Date Value Ref Range Status   12/10/2021 62 >60 mL/min/1.73m2 Final     Comment:     As of July 11, 2021, eGFR is calculated by the CKD-EPI creatinine equation, without race adjustment. eGFR can be influenced by muscle mass, exercise, and diet. The reported eGFR is an estimation only and is only applicable if the renal function is stable.   12/07/2020 50 (L) >60 mL/min/1.73m2 Final     GFR Estimate If Black   Date Value Ref Range Status   12/07/2020 60 (L) >60 mL/min/1.73m2 Final     Lab Results   Component Value Date    CR 0.89 12/10/2021     No results found for: MICROALBUMIN    Healthy Eating:  Meal planning/habits: Avoiding sweets, Carb counting, Low carb, Smaller portions  Meals include: Breakfast, Lunch, Dinner  Beverages: Water  Has patient met with a dietitian in the past?: No    Being Active:  Being Active Assessed Today: Yes  Exercise:: Currently not exercising  Barrier to exercise: Physical limitation (chronic back pain)    Monitoring:  Monitoring Assessed Today: Yes  Did patient bring glucose meter to appointment? : Yes (CGM reader)  Blood Glucose Meter: CGM  Times checking blood sugar at home (number): 5+  Times checking blood sugar at home (per): Day  Blood glucose trend: Fluctuating    Glucose data:            Taking Medications:  Diabetes Medication(s)     Insulin       insulin aspart (NOVOLOG VIAL) 100 UNITS/ML vial    Inject 40 Units  Subcutaneous 3 times daily (with meals)     insulin aspart U-100 (NOVOLOG U-100 INSULIN ASPART) 100 unit/mL injection    [INSULIN ASPART U-100 (NOVOLOG U-100 INSULIN ASPART) 100 UNIT/ML INJECTION] Inject 20 Units under the skin 3 (three) times a day before meals. 11.9 Type 2 without complications     LANTUS VIAL 100 UNIT/ML soln    Inject 50 Units Subcutaneous At Bedtime    Incretin Mimetic Agents (GLP-1 Receptor Agonists)       semaglutide (OZEMPIC, 0.25 OR 0.5 MG/DOSE,) 2 MG/1.5ML SOPN pen    Inject 0.5 mg Subcutaneous once a week          Taking Medication Assessed Today: Yes  Current Treatments: Insulin Injections, Non-insulin Injectables, Oral Medication (taken by mouth)  Dose schedule: At bedtime  Given by: Patient  Injection/Infusion sites: Abdomen  Problems taking diabetes medications regularly?: No  Diabetes medication side effects?: Yes (Complaint today of constipation over the past few weeks after starting 1 mg of Ozempic.  She has not taken this week's dose, says things seem better-will decrease dose back down to 0.5 mg and see how she does with that.)    Problem Solving:  Problem Solving Assessed Today: Yes  Is the patient at risk for hypoglycemia?: Yes  Hypoglycemia Frequency: Weekly  Hypoglycemia Treatment: Candy (milk)              Reducing Risks:  Reducing Risks Assessed Today: Yes  Diabetes Risks: Age over 45 years, Sedentary Lifestyle  CAD Risks: Diabetes Mellitus, Family history, Hypertension, Post-menopausal, Sedentary lifestyle  Has dilated eye exam at least once a year?: Yes  Feet checked by healthcare provider in the last year?: Yes    Healthy Coping:  Healthy Coping Assessed Today: Yes  Emotional response to diabetes: Ready to learn  Stage of change: ACTION (Actively working towards change)  Support resources: In-person Offerings  Patient Activation Measure Survey Score:  No flowsheet data found.          Thank you,  Sandrine Vu RN [facility]  Certified Diabetes Care and Education  Specialist  Visit type : DSMT      Time Spent: 60 minutes  Encounter Type: Individual        Any diabetes medication dose changes were made via the Certified Diabetes Care & Education Protocol in collaboration with the patient's referring provider and primary care provider. A copy of this encounter was shared with the provider.    Much or all of the text in this note was generated through the use of the Dragon Dictate voice-to-text software.Errors in spelling or words which seem out of context are unintentional. Sound alike errors, in particular, may have escaped editing.

## 2022-03-28 NOTE — PATIENT INSTRUCTIONS
1. Eat 3 balanced meals each day - Monitor carb intake and limit to 45-60 grams per meal  This would be equal to 3-4 choices ~  1 choice = 15 grams    Do not wait longer than 4-5 hours to eat something  Snacks limit to no more than 30 grams of carbohydrates or 2 choices  Make sure you include protein source with each meal and at bedtime - this has been shown to help with blood glucose elevations    2. Check blood sugars several times each day - You do FABULOUSLY at scanning    Fasting and before meal target is 80 - 130   2 hours after a meal target is < 180  remember to bring meter and log book to all appointments    3. Incorporate 30 minutes activity into each day - does not need to be all at one time & walking counts    4. Take diabetes medications as prescribed reduce Ozempic to 0.5 mg weekly, Continue Lantus 50 units before bed, and Novolog with meals 30 - 40 units depending on what your blood glucose is       WOWSERS  - you are doing GREAT GREAT GREAT !!! Keep it up sister !!!

## 2022-03-28 NOTE — LETTER
3/28/2022         RE: Kelley Styles  [address] [id number]        Dear Colleague,    Thank you for referring your patient, Kelley Styles, to the [facility]. Please see a copy of my visit note below.    Diabetes Self-Management Education & Support    Presents for: Individual review    Type of Visit: In Person    How would patient like to obtain AVS? Kelley Styles was provided a copy of AVS at conclusion of todays visit.    ASSESSMENT:    Kelley is a very pleasant 81-year-old who comes to clinic today for routine office visit to recheck A1c and to assess/assess her current diabetes self-management skills.  She arrived today unaccompanied and had her [employer] [place] 2 CGM reader with her.  Per her report she has been taking both Lantus and Novolog daily as prescribed with no missed or skipped doses-because she started experiencing constipation and uncomfortably hard stools after increasing Ozempic to 1 mg did not take it this week.  We discussed this and she agreed to lower dose to 0.5 mg to see if things improved with the lower dose.  A1c was rechecked today and was very happy to see it is now at 5.9%!  Congratulated her and commended her on the progress she has made.    I do have some concerns however as to the hypoglycemic events she has been having that were noted today.  This is most often occurring in the evenings and she states she does not feel symptomatic at all.  Usually she will get a low blood sugar when she scans.  Suspect one of the causative factors is she is over correcting when dosing.  Advised her to make sure she is checking her blood glucose before she eats and dosing based upon both her pre meal number as well as her intake.  I also advised her today to try and keep Lantus at a consistent dose.   She continues to lose weight and overall says she feels good.  Will be taking a trip with her brother to [place] in the next couple of weeks and is  looking forward to it.  Patient's most recent   Lab Results   Component Value Date    A1C 5.9 03/28/2022    is meeting goal of <7.0    Diabetes knowledge and skills assessment:   Patient is knowledgeable in diabetes management concepts related to: Healthy Eating, Monitoring and Healthy Coping    Continue education with the following diabetes management concepts: Healthy Eating, Being Active, Monitoring, Taking Medication, Problem Solving, Reducing Risks and Healthy Coping    Based on learning assessment above, most appropriate setting for further diabetes education would be: Individual setting.    INTERVENTIONS:    Education provided today on:  AADE Self-Care Behaviors:  Healthy Eating: carbohydrate counting, consistency in amount, composition, and timing of food intake, portion control and label reading  Being Active: describe appropriate activity program and precautions to take  Monitoring: individual blood glucose targets and frequency of monitoring  Taking Medication: action of prescribed medication, proper site selection and rotation for injections, side effects of prescribed medications and when to take medications  Problem Solving: high blood glucose - causes, signs/symptoms, treatment and prevention, low blood glucose - causes, signs/symptoms, treatment and prevention and carrying a carbohydrate source at all times  Reducing Risks: prevention, early diagnostic measures and treatment of complications, annual eye exam and A1C - goals, relating to blood glucose levels, how often to check  Healthy Coping: benefits of making appropriate lifestyle changes and utilize support systems    Opportunities for ongoing education and support in diabetes-self management were discussed. Pt verbalized understanding of concepts discussed and recommendations provided today.             Goals Addressed as of 3/28/2022 at 1:58 PM                 Today    11/8/21      Changes in Lifestyle   Not on track  On track    Added 8/9/21  by Sandrine Cornell RN     Activity  - work on increasing activity daily - try and do chair exercises with bands at least 3 times each week 8/9/2021          Have 3 meals a day   On track  On track    Added 8/9/21 by Sandrine Cornell RN     Eat 3 balanced meals each day - Monitor carb intake and limit to 45-60 grams per meal  This would be equal to 3-4 choices ~  1 choice = 15 grams  Snacks no more than 30 grams or 2 choices            PLAN  Monitor blood glucose several times daily.  Kelley was reminded to bring meter/log book to all follow-up appointments in clinic.  Nutrition: Eat 3 balanced meals daily, following parameters set by ADA guidelines for intake of carbohydrates for both meals and snacks.  Remember to include a protein with each meal and at bedtime.  Medications: Continue Lantus 50 units SC daily and 25 units Novolog with meals.  Will decrease Ozempic to 0.5 mg SC weekly  Activity: Work on increasing activity while remaining safe.    Topics to cover at upcoming visits: Healthy Eating, Being Active, Monitoring, Taking Medication, Problem Solving and Healthy Coping    Follow-up: 6 months scheduled for September 12    See Goals Section for co-developed, patient-stated behavior change goals.  AVS provided to patient today.          SUBJECTIVE / OBJECTIVE:  Presents for: Individual review  Accompanied by: Self  Diabetes education in the past 24 mo: Yes  Focus of Visit: Assistance w/ making life changes, Self-care behavioral goal setting, Taking Medication, Healthy Coping  Diabetes type: Type 2  Disease course: Improving  Transportation concerns: No  Difficulty affording diabetes medication?: No  Difficulty affording diabetes testing supplies?: No  Other concerns:: Visual impairment  Cultural Influences/Ethnic Background:  Not  or       Diabetes Symptoms & Complications:  Fatigue: Sometimes       Patient Problem List and Family Medical History reviewed for relevant medical history, current  "medical status, and diabetes risk factors.    Vitals:  Wt 81.6 kg (180 lb)   BMI 28.58 kg/m    Estimated body mass index is 28.58 kg/m  as calculated from the following:    Height as of 1/12/21: 1.69 m (5' 6.54\").    Weight as of this encounter: 81.6 kg (180 lb).   Last 3 BP:   BP Readings from Last 3 Encounters:   12/10/21 122/64   01/12/21 128/67   01/04/21 122/56       History   Smoking Status     Never Smoker   Smokeless Tobacco     Never Used     Comment: pt states that 50yrs+, smoked for x 1 year only       Labs:  Lab Results   Component Value Date    A1C 5.9 03/28/2022     Lab Results   Component Value Date     12/10/2021     Lab Results   Component Value Date    LDL 54 12/07/2020    LDL 82 10/14/2019     Direct Measure HDL   Date Value Ref Range Status   12/07/2020 39 (L) >=50 mg/dL Final   ]  GFR Estimate   Date Value Ref Range Status   12/10/2021 62 >60 mL/min/1.73m2 Final     Comment:     As of July 11, 2021, eGFR is calculated by the CKD-EPI creatinine equation, without race adjustment. eGFR can be influenced by muscle mass, exercise, and diet. The reported eGFR is an estimation only and is only applicable if the renal function is stable.   12/07/2020 50 (L) >60 mL/min/1.73m2 Final     GFR Estimate If Black   Date Value Ref Range Status   12/07/2020 60 (L) >60 mL/min/1.73m2 Final     Lab Results   Component Value Date    CR 0.89 12/10/2021     No results found for: MICROALBUMIN    Healthy Eating:  Meal planning/habits: Avoiding sweets, Carb counting, Low carb, Smaller portions  Meals include: Breakfast, Lunch, Dinner  Beverages: Water  Has patient met with a dietitian in the past?: No    Being Active:  Being Active Assessed Today: Yes  Exercise:: Currently not exercising  Barrier to exercise: Physical limitation (chronic back pain)    Monitoring:  Monitoring Assessed Today: Yes  Did patient bring glucose meter to appointment? : Yes (CGM reader)  Blood Glucose Meter: CGM  Times checking blood " sugar at home (number): 5+  Times checking blood sugar at home (per): Day  Blood glucose trend: Fluctuating    Glucose data:            Taking Medications:  Diabetes Medication(s)     Insulin       insulin aspart (NOVOLOG VIAL) 100 UNITS/ML vial    Inject 40 Units Subcutaneous 3 times daily (with meals)     insulin aspart U-100 (NOVOLOG U-100 INSULIN ASPART) 100 unit/mL injection    [INSULIN ASPART U-100 (NOVOLOG U-100 INSULIN ASPART) 100 UNIT/ML INJECTION] Inject 20 Units under the skin 3 (three) times a day before meals. 11.9 Type 2 without complications     LANTUS VIAL 100 UNIT/ML soln    Inject 50 Units Subcutaneous At Bedtime    Incretin Mimetic Agents (GLP-1 Receptor Agonists)       semaglutide (OZEMPIC, 0.25 OR 0.5 MG/DOSE,) 2 MG/1.5ML SOPN pen    Inject 0.5 mg Subcutaneous once a week          Taking Medication Assessed Today: Yes  Current Treatments: Insulin Injections, Non-insulin Injectables, Oral Medication (taken by mouth)  Dose schedule: At bedtime  Given by: Patient  Injection/Infusion sites: Abdomen  Problems taking diabetes medications regularly?: No  Diabetes medication side effects?: Yes (Complaint today of constipation over the past few weeks after starting 1 mg of Ozempic.  She has not taken this week's dose, says things seem better-will decrease dose back down to 0.5 mg and see how she does with that.)    Problem Solving:  Problem Solving Assessed Today: Yes  Is the patient at risk for hypoglycemia?: Yes  Hypoglycemia Frequency: Weekly  Hypoglycemia Treatment: Candy (milk)              Reducing Risks:  Reducing Risks Assessed Today: Yes  Diabetes Risks: Age over 45 years, Sedentary Lifestyle  CAD Risks: Diabetes Mellitus, Family history, Hypertension, Post-menopausal, Sedentary lifestyle  Has dilated eye exam at least once a year?: Yes  Feet checked by healthcare provider in the last year?: Yes    Healthy Coping:  Healthy Coping Assessed Today: Yes  Emotional response to diabetes: Ready to  learn  Stage of change: ACTION (Actively working towards change)  Support resources: In-person Offerings  Patient Activation Measure Survey Score:  No flowsheet data found.          Thank you,  Sandrine Vu RN [facility]  Certified Diabetes Care and   Visit type : DSMT      Time Spent: 60 minutes  Encounter Type: Individual        Any diabetes medication dose changes were made via the Certified Diabetes Care & Education Protocol in collaboration with the patient's referring provider and primary care provider. A copy of this encounter was shared with the provider.    Much or all of the text in this note was generated through the use of the Dragon Dictate voice-to-text software.Errors in spelling or words which seem out of context are unintentional. Sound alike errors, in particular, may have escaped editing.

## 2022-04-28 NOTE — TELEPHONE ENCOUNTER
Reason for Call:  Medication or medication refill:    Do you use a [facility]?  Name of the pharmacy and phone number for the current request:     [facility]    Name of the medication requested:   Freestyle Liane Sensor  Delivery date is set for 04/29/2022    Other request: n/a    Can we leave a detailed message on this number? YES    Phone number patient can be reached at: Other phone number:  [phone]    Best Time: anytime    Call taken on 4/28/2022 at 9:44 AM by Anu Muir

## 2022-04-28 NOTE — TELEPHONE ENCOUNTER
Routing refill request to provider for review/approval because:  Script rewrite needed due to system error    Last Written Prescription Date:  21  Last Fill Quantity: 2,  # refills:  11  Last office visit provider:  12/10/21     Requested Prescriptions   Pending Prescriptions Disp Refills     UNABLE TO FIND 2 each 11     Si each daily [FLASH GLUCOSE SENSOR (FREESTYLE [place]ERARD[place] 2 SENSOR) KIT] Use 1 each As Directed daily. Replace sensor every 14 days       There is no refill protocol information for this order          Owen Segovia RN 22 10:32 AM

## 2022-05-03 ENCOUNTER — IMMUNIZATION (OUTPATIENT)
Dept: NURSING | Facility: CLINIC | Age: 82
End: 2022-05-03
Payer: COMMERCIAL

## 2022-05-03 PROCEDURE — 0064A COVID-19,PF,MODERNA (18+ YRS BOOSTER .25ML): CPT

## 2022-05-03 PROCEDURE — 91306 COVID-19,PF,MODERNA (18+ YRS BOOSTER .25ML): CPT

## 2022-08-31 ENCOUNTER — OFFICE VISIT (OUTPATIENT)
Dept: FAMILY MEDICINE | Facility: CLINIC | Age: 82
End: 2022-08-31
Payer: COMMERCIAL

## 2022-08-31 VITALS
DIASTOLIC BLOOD PRESSURE: 60 MMHG | WEIGHT: 191 LBS | SYSTOLIC BLOOD PRESSURE: 104 MMHG | OXYGEN SATURATION: 98 % | HEART RATE: 77 BPM | BODY MASS INDEX: 30.33 KG/M2

## 2022-08-31 DIAGNOSIS — N64.4 BREAST PAIN: Primary | ICD-10-CM

## 2022-08-31 PROCEDURE — 99213 OFFICE O/P EST LOW 20 MIN: CPT | Performed by: FAMILY MEDICINE

## 2022-08-31 NOTE — PROGRESS NOTES
Kelley was seen today for breast pain.    Diagnoses and all orders for this visit:    Breast pain  -     Cancel: MA Diagnostic Digital Bilateral; Future  -     US Breast Right Limited 1-3 Quadrants; Future  -     Cancel: MA Diagnostic Right w/Edilson; Future    No rashes or masses felt.  May consider a possibility of zoster given unilateral dermatomal distribution.  Will rule out any breast abnormality with imaging.        Subjective   Kelley is a 82 year old, presenting for the following health issues:  Breast Pain (Right breast has been sore for 3 week, more on the bottom, had an neg mammogram on March)      HPI     Right breast pain since about a month ago.  No history of breast issues. Gets annual mammogram.  There was no swelling or discharge.  About 2-3 weeks ago, the whole lower half started to feel sore and it has not gone away.  She had been gardening about that time, wondered of she injured her right side.  It feels sore.  Wondered if she bruised a rib.  It has travelled laterally.  No rashes that she could see.  Sees Dr. Zepeda of [facility].  Going to [facility] at the end of September.       Objective    /60   Pulse 77   Wt 86.6 kg (191 lb)   SpO2 98%   BMI 30.33 kg/m    Body mass index is 30.33 kg/m .  Physical Exam   Gen: Patient is alert and oriented,NAD  CV: RRR without murmur  Lungs: CTAB without wheezes rales or rhonchi.  Chest: No evidence of rash, no tenderness to palpation along the costochondral junctions.  No tenderness to costochondral border.  Breast: Inspection: Breasts appear symmetric.  No evidence of rash, no erythema, no swelling.  Palpation: Right breast has some discomfort to palpation of the tissue essentially from 7:00-8:00.  There does not appear to be any palpable masses.  No associated lymphadenopathy.  Left breast is benign without pain, masses, or lymphadenopathy.  Nipples: Patient does not have any nipple discharge.        .  ..

## 2022-09-07 ENCOUNTER — TELEPHONE (OUTPATIENT)
Dept: FAMILY MEDICINE | Facility: CLINIC | Age: 82
End: 2022-09-07

## 2022-09-07 NOTE — TELEPHONE ENCOUNTER
New Medication Request        What medication are you requesting?    increase in dosage-pt has would like an increase in dosage of 180 Ml/and use 60 unites per day-3 times per day.    insulin aspart (NOVOLOG VIAL) 100 UNITS/ML vial 110 mL 3 12/20/2021  No   Sig - Route: Inject 40 Units Subcutaneous 3 times daily (with meals) - Subcutaneous            Have you taken this medication before?: Yes: just wanting in increase in dosage    Controlled Substance Agreement on file:   CSA -- Patient Level:    CSA: None found at the patient level.         Patient offered an appointment? No    Preferred Pharmacy:   [facility] - [address] [address]  Phone: [phone] Fax: [phone]    Could we send this information to you in [place] or would you prefer to receive a phone call?:   Patient would prefer a phone call   Okay to leave a detailed message?: Yes at Cell number on file:    Telephone Information:   Mobile [phone]

## 2022-09-08 ENCOUNTER — HOSPITAL ENCOUNTER (OUTPATIENT)
Dept: MAMMOGRAPHY | Facility: CLINIC | Age: 82
Discharge: HOME | End: 2022-09-08
Attending: FAMILY MEDICINE
Payer: COMMERCIAL

## 2022-09-08 DIAGNOSIS — N64.4 BREAST PAIN: ICD-10-CM

## 2022-09-08 PROCEDURE — 76642 ULTRASOUND BREAST LIMITED: CPT | Mod: RT

## 2022-09-08 NOTE — TELEPHONE ENCOUNTER
Current Rx indicated 40 units tid, MD to review and advise on dose increase. Assuming patient may need visit to discuss medication changes?

## 2022-09-09 NOTE — TELEPHONE ENCOUNTER
Have you seen her recently?  Last note I see is from March and that she was only on 25 units with meals.  I do not know if he was discussed a steady increase.  Are you still able to see her blood sugars?  I think regardless she is due for a visit with myself or you.    Harmony [place]

## 2022-09-12 ENCOUNTER — LAB (OUTPATIENT)
Dept: LAB | Facility: CLINIC | Age: 82
End: 2022-09-12
Payer: COMMERCIAL

## 2022-09-12 ENCOUNTER — OFFICE VISIT (OUTPATIENT)
Dept: EDUCATION SERVICES | Facility: CLINIC | Age: 82
End: 2022-09-12
Payer: COMMERCIAL

## 2022-09-12 VITALS — WEIGHT: 189.9 LBS | BODY MASS INDEX: 30.16 KG/M2

## 2022-09-12 DIAGNOSIS — E11.65 UNCONTROLLED TYPE 2 DIABETES MELLITUS WITH HYPERGLYCEMIA (H): Primary | ICD-10-CM

## 2022-09-12 DIAGNOSIS — Z79.4 TYPE 2 DIABETES MELLITUS WITHOUT COMPLICATION, WITH LONG-TERM CURRENT USE OF INSULIN (H): ICD-10-CM

## 2022-09-12 DIAGNOSIS — E11.9 TYPE 2 DIABETES MELLITUS WITHOUT COMPLICATION, WITH LONG-TERM CURRENT USE OF INSULIN (H): ICD-10-CM

## 2022-09-12 DIAGNOSIS — E11.65 UNCONTROLLED TYPE 2 DIABETES MELLITUS WITH HYPERGLYCEMIA (H): ICD-10-CM

## 2022-09-12 LAB — HBA1C MFR BLD: 5.8 % (ref 0–5.6)

## 2022-09-12 PROCEDURE — 36415 COLL VENOUS BLD VENIPUNCTURE: CPT

## 2022-09-12 PROCEDURE — G0108 DIAB MANAGE TRN  PER INDIV: HCPCS

## 2022-09-12 PROCEDURE — 83036 HEMOGLOBIN GLYCOSYLATED A1C: CPT

## 2022-09-12 RX ORDER — INSULIN ASPART 100 [IU]/ML
50 INJECTION, SOLUTION INTRAVENOUS; SUBCUTANEOUS
Qty: 140 ML | Refills: 3 | Status: SHIPPED | OUTPATIENT
Start: 2022-09-12 | End: 2022-09-12

## 2022-09-12 RX ORDER — SEMAGLUTIDE 1.34 MG/ML
0.25 INJECTION, SOLUTION SUBCUTANEOUS
Qty: 1.5 ML | Refills: 3 | Status: SHIPPED | OUTPATIENT
Start: 2022-09-12 | End: 2023-07-20

## 2022-09-12 RX ORDER — INSULIN ASPART 100 [IU]/ML
50 INJECTION, SOLUTION INTRAVENOUS; SUBCUTANEOUS
Qty: 140 ML | Refills: 3 | Status: SHIPPED | OUTPATIENT
Start: 2022-09-12 | End: 2022-09-15

## 2022-09-12 NOTE — PATIENT INSTRUCTIONS
"1. Eat 3 balanced meals each day - Monitor carb intake and limit to 45-60 grams per meal  This would be equal to 3-4 choices ~  1 choice = 15 grams    Do not wait longer than 4-5 hours to eat something  Snacks limit to no more than 30 grams of carbohydrates or 2 choices  Make sure you include protein source with each meal and at bedtime - this has been shown to help with blood glucose elevations    2. Check blood sugars 3  times each day   Fasting and before meal target is 80 - 130   2 hours after a meal target is < 180  remember to bring meter and log book to all appointments    3. Incorporate 30 minutes activity into each day - does not need to be all at one time & walking counts    4. Take diabetes medications as prescribed start splitting Lantus 25 units twice daily - take 12 hours apart, Novolog up to 40 units with meals  NO EXTRA \"BUMPS\"!!!!-  restart Ozempic 0.25 mg weekly for 4 weeks then increase to 0.5 mg weekly     If you start to have a lot of lows decrease Novolog to 35 units with meals - send me your numbers through My Chart 3 weeks after you  start the Ozempic      "

## 2022-09-12 NOTE — LETTER
9/12/2022         RE: Kelley Styles  [address] [address]        Dear Colleague,    Thank you for referring your patient, Kelley Styles, to the [facility]. Please see a copy of my visit note below.      Diabetes Self-Management Education & Support    Presents for: Individual review    Type of Visit: In Person    How would patient like to obtain AVS?  A copy of AVS was provided to patient at conclusion of today's visit.    ASSESSMENT:    Kelley is a very pleasant 82-year-old who comes to clinic today for 6-month follow-up to review blood glucose, check A1c and assist/assess her current diabetes self-management skills.  She arrived today unaccompanied.  He is currently using the Cardiocore 2 personal CGM to monitor her blood glucose and had her reader with her today which was downloaded, reviewed and discussed.  Per her providers request we also clarified her current insulin dosing.  Again had caution her today about administering extra insulin within the 4-hour window of last injection.  She sometimes does not trust the process and if her blood glucose is not coming down fast enough will administer additional insulin and ultimately have a hypoglycemic episode.   She expressed interest in once again restarting Ozempic-informed her I would have to contact her PCP for permission to place the order.  I reiterated with her the importance of monitoring her blood glucose closely after starting Ozempic as her basal and bolus needs will most likely be less.  This is especially important because she does not always feel symptoms.  Instructed her to contact me if she should start to experience hypoglycemia  She shared with me today she has plans to travel to [place] she is very excited for this.    Patient's most recent   Lab Results   Component Value Date    A1C 5.8 09/12/2022    is meeting goal of <7.0    Diabetes knowledge and skills assessment:   Patient is knowledgeable  in diabetes management concepts related to: Healthy Eating, Monitoring and Healthy Coping    Continue education with the following diabetes management concepts: Healthy Eating, Being Active, Taking Medication and Reducing Risks    Based on learning assessment above, most appropriate setting for further diabetes education would be: Individual setting.    INTERVENTIONS:    Education provided today on:  AADE Self-Care Behaviors:  Care Plan: Diabetes   Updates made by Sandrine Cornell RN since 9/15/2022 12:00 AM      Problem: HbA1C Not In Goal       Goal: Establish Regular Follow-Ups with PCP Completed 9/12/2022   This Visit's Progress: 100%      Task: Discuss with PCP the recommended timing for patient's next follow up visit(s) Completed 9/15/2022   Responsible User: Sandrine Cornell RN      Task: Discuss schedule for PCP visits with patient Completed 9/15/2022   Responsible User: Sandrine Cornell RN      Goal: Get HbA1C Level in Goal Completed 9/12/2022   This Visit's Progress: 100%      Task: Educate patient on diabetes education self-management topics Completed 9/15/2022   Responsible User: Sandrine Cornell RN      Task: Educate patient on benefits of regular glucose monitoring Completed 9/15/2022   Responsible User: Sandrine Cornell RN      Task: Discuss diabetes treatment plan with patient Completed 9/15/2022   Responsible User: Sandrine Cornell RN      Problem: Diabetes Self-Management Education Needed to Optimize Self-Care Behaviors       Goal: Understand diabetes pathophysiology and disease progression    This Visit's Progress: 80%   Note:    This has been done at previous visit - continue to review prn     Task: Provide education on diabetes pathophysiology and disease progression specfic to patient's diabetes type Completed 9/15/2022   Responsible User: Sandrine Cornell RN      Goal: Healthy Eating - follow a healthy eating pattern for diabetes    This Visit's Progress: 20%      Task: Provide education  on portion control and consistency in amount, composition and timing of food intake Completed 9/15/2022   Responsible User: Sandrine Cornell RN      Task: Provide education on managing carbohydrate intake (carbohydrate counting, plate planning method, etc.)    Responsible User: Sandrine Cornell RN      Task: Provide education on weight management Completed 9/15/2022   Responsible User: Sandrine Cornell RN      Task: Provide education on heart healthy eating    Responsible User: Sandrine Cornell RN      Task: Provide education on eating out    Responsible User: Sandrine Cornell RN      Task: Develop individualized healthy eating plan with patient    Responsible User: Sandrine Cornell RN      Goal: Being Active - get regular physical activity, working up to at least 150 minutes per week    This Visit's Progress: 20%      Task: Provide education on relationship of activity to glucose and precautions to take if at risk for low glucose Completed 9/15/2022   Responsible User: Sandrine Cornell RN      Task: Discuss barriers to physical activity with patient    Responsible User: Sandrine Cornell RN      Task: Develop physical activity plan with patient    Responsible User: Sandrine Cornell RN      Task: Explore community resources including walking groups, assistance programs, and home videos    Responsible User: Sandrine Cornell RN      Goal: Monitoring - monitor glucose and ketones as directed    This Visit's Progress: 30%      Task: Provide education on continuous glucose monitoring (sensor placement, use of isela or /reader, understanding glucose trends, alerts and alarms, differences between sensor glucose and blood glucose) Completed 9/15/2022   Responsible User: Sandrine Cornell RN      Goal: Taking Medication - patient is consistently taking medications as directed    This Visit's Progress: 90%   Note:    Majority of visit was focused here today - covered dosing , timing , administration - when to  refill      Task: Provide education on action of prescribed medication, including when to take and possible side effects Completed 9/15/2022   Responsible User: Sandrine Cornell RN      Task: Provide education on insulin and injectable diabetes medications, including administration, storage, site selection and rotation for injection sites Completed 9/15/2022   Responsible User: Sandrine Cornell RN      Task: Discuss barriers to medication adherence with patient and provide management technique ideas as appropriate Completed 9/15/2022   Responsible User: Sandrine Cornell RN      Task: Provide education on frequency and refill details of medications Completed 9/15/2022   Responsible User: Sandrine Cornell RN      Goal: Problem Solving - know how to prevent and manage short-term diabetes complications    This Visit's Progress: 40%   Note:    Reviewed dangers of stacking insulin today      Task: Provide education on high blood glucose - causes, signs/symptoms, prevention and treatment Completed 9/15/2022   Responsible User: Sandrine Cornell RN      Task: Provide education on low blood glucose - causes, signs/symptoms, prevention, treatment, carrying a carbohydrate source at all times, and medical identification Completed 9/15/2022   Responsible User: Sandrine Cornell RN      Task: Provide education on safe travel with diabetes    Responsible User: Sandrine Cornell RN      Task: Provide education on how to care for diabetes on sick days    Responsible User: Sandrine Cornell RN      Task: Provide education on when to call a health care provider    Responsible User: Sandrine Cornell RN      Goal: Reducing Risks - know how to prevent and treat long-term diabetes complications       Task: Provide education on major complications of diabetes, prevention, early diagnostic measures and treatment of complications    Responsible User: Sandrine Cornell RN      Task: Provide education on recommended care for dental, eye  and foot health    Responsible User: Sandrine Cornell RN      Task: Provide education on Hemoglobin A1c - goals and relationship to blood glucose levels Completed 9/15/2022   Responsible User: Sandrine Cornell RN      Task: Provide education on recommendations for heart health - lipid levels and goals, blood pressure and goals, and aspirin therapy, if indicated    Responsible User: Sandrine Cornell RN      Goal: Healthy Coping - use available resources to cope with the challenges of managing diabetes    This Visit's Progress: 30%      Task: Discuss recognizing feelings about having diabetes Completed 9/15/2022   Responsible User: Sandrine Cornell RN      Task: Provide education on the benefits of making appropriate lifestyle changes Completed 9/15/2022   Responsible User: Sandrine Cornell RN      Task: Provide education on benefits of utilizing support systems    Responsible User: Sandrine Cornell RN      Task: Discuss methods for coping with stress    Responsible User: Sandrine Cornell RN      Task: Provide education on when to seek professional counseling    Responsible User: Sandrine Cornell RN          Opportunities for ongoing education and support in diabetes-self management were discussed. Pt verbalized understanding of concepts discussed and recommendations provided today.       Education Materials Provided:  No new materials provided today          PLAN  . Eat 3 balanced meals each day - Monitor carb intake and limit to 45-60 grams per meal  This would be equal to 3-4 choices ~  1 choice = 15 grams    Do not wait longer than 4-5 hours to eat something  Snacks limit to no more than 30 grams of carbohydrates or 2 choices  Make sure you include protein source with each meal and at bedtime - this has been shown to help with blood glucose elevations    2. Check blood sugars 3  times each day   Fasting and before meal target is 80 - 130   2 hours after a meal target is < 180  remember to bring meter and  "log book to all appointments    3. Incorporate 30 minutes activity into each day - does not need to be all at one time & walking counts    4. Take diabetes medications as prescribed start splitting Lantus 25 units twice daily - take 12 hours apart, Novolog up to 40 units with meals  NO EXTRA \"BUMPS\"!!!!-  restart Ozempic 0.25 mg weekly for 4 weeks then increase to 0.5 mg weekly     If you start to have a lot of lows decrease Novolog to 35 units with meals - send me your numbers through My Chart 3 weeks after you  start the Ozempic     Topics to cover at upcoming visits: Healthy Eating, Taking Medication, Problem Solving and Reducing Risks  Follow-up: will follow up with BG in 3 weeks via My Chart to review BG after Ozempic start, return to clinic 1/9/2023    See Goals Section for co-developed, patient-stated behavior change goals.  AVS provided to patient today.          SUBJECTIVE / OBJECTIVE:  Presents for: Individual review  Accompanied by: Self  Diabetes education in the past 24 mo: Yes  Focus of Visit: Assistance w/ making life changes, Self-care behavioral goal setting, Taking Medication, Reducing Risks  Diabetes type: Type 2  Date of diagnosis: > 10 yrs  Disease course: Stable  Transportation concerns: No  Other concerns:: None  Cultural Influences/Ethnic Background:  Not  or       Diabetes Symptoms & Complications:     Complications assessed today?: No    Patient Problem List and Family Medical History reviewed for relevant medical history, current medical status, and diabetes risk factors.    Vitals:  Wt 86.1 kg (189 lb 14.4 oz)   BMI 30.16 kg/m    Estimated body mass index is 30.16 kg/m  as calculated from the following:    Height as of 1/12/21: 1.69 m (5' 6.54\").    Weight as of this encounter: 86.1 kg (189 lb 14.4 oz).   Last 3 BP:   BP Readings from Last 3 Encounters:   08/31/22 104/60   12/10/21 122/64   01/12/21 128/67       History   Smoking Status     Never Smoker   Smokeless Tobacco "     Never Used     Comment: pt states that 50yrs+, smoked for x 1 year only       Labs:  Lab Results   Component Value Date    A1C 5.8 09/12/2022     Lab Results   Component Value Date     12/10/2021     Lab Results   Component Value Date    LDL 54 12/07/2020    LDL 82 10/14/2019     Direct Measure HDL   Date Value Ref Range Status   12/07/2020 39 (L) >=50 mg/dL Final   ]  GFR Estimate   Date Value Ref Range Status   12/10/2021 62 >60 mL/min/1.73m2 Final     Comment:     As of July 11, 2021, eGFR is calculated by the CKD-EPI creatinine equation, without race adjustment. eGFR can be influenced by muscle mass, exercise, and diet. The reported eGFR is an estimation only and is only applicable if the renal function is stable.   12/07/2020 50 (L) >60 mL/min/1.73m2 Final     GFR Estimate If Black   Date Value Ref Range Status   12/07/2020 60 (L) >60 mL/min/1.73m2 Final     Lab Results   Component Value Date    CR 0.89 12/10/2021     No results found for: MICROALBUMIN    Healthy Eating:  Healthy Eating Assessed Today: No  Cultural/Spiritism diet restrictions?: No    Being Active:       Monitoring:  Monitoring Assessed Today: Yes  Did patient bring glucose meter to appointment? : Yes  Blood Glucose Meter: CGM (Liane reader)  Times checking blood sugar at home (number): 5+  Times checking blood sugar at home (per): Day  Blood glucose trend: Fluctuating    Glucose data:                Taking Medications:  Diabetes Medication(s)     Insulin       insulin aspart (NOVOLOG VIAL) 100 UNITS/ML vial    Inject 50 Units Subcutaneous 3 times daily (with meals)     LANTUS VIAL 100 UNIT/ML soln    Inject 50 Units Subcutaneous At Bedtime     Patient taking differently: Inject 25 Units Subcutaneous 2 times daily 25 units before breakfast and 25 units at bedtime    Incretin Mimetic Agents (GLP-1 Receptor Agonists)       semaglutide (OZEMPIC, 0.25 OR 0.5 MG/DOSE,) 2 MG/1.5ML SOPN pen    Inject 0.25 mg Subcutaneous every 7 days      semaglutide (OZEMPIC, 0.25 OR 0.5 MG/DOSE,) 2 MG/1.5ML SOPN pen    Inject 0.5 mg Subcutaneous once a week     Patient taking differently: Inject 0.25 mg Subcutaneous once a week 0.25 mg for 4 weeks then increase to 0.5 mg weekly        Insulin doses were discussed and clarified with Kelley today.  Prescriptions were then sent for reorder of vials.  Taking Medication Assessed Today: Yes  Current Treatments: Insulin Injections  Dose schedule: Pre-breakfast, Pre-lunch, Pre-dinner, At bedtime  Given by: Patient  Injection/Infusion sites: Abdomen  Problems taking diabetes medications regularly?: No  Diabetes medication side effects?: No    Problem Solving:  Problem Solving Assessed Today: Yes  Is the patient at risk for hypoglycemia?: Yes  Hypoglycemia Frequency: Weekly  Hypoglycemia Treatment: Juice, Other food    Hypoglycemia symptoms  Confusion: Yes  Dizziness or Light-Headedness: Yes  Sweats: Yes  Feeling shaky: Yes         Reducing Risks:  Diabetes Risks: Age over 45 years, Sedentary Lifestyle, Hyperlipidemia  CAD Risks: Hypertension, Diabetes Mellitus, Family history, Post-menopausal, Sedentary lifestyle  Has dilated eye exam at least once a year?: Yes    Healthy Coping:  Healthy Coping Assessed Today: Yes  Emotional response to diabetes: Ready to learn, Concern for health and well-being  Stage of change: ACTION (Actively working towards change)  Support resources: In-person Offerings  Patient Activation Measure Survey Score:  No flowsheet data found.          Thank you,  Sandrine Vu RN CDCES  Certified Diabetes Care and   Visit type : DSMT      Time Spent: 60 minutes  Encounter Type: Individual        Any diabetes medication dose changes were made via the Certified Diabetes Care & Education Protocol in collaboration with the patient's referring provider and primary care provider. A copy of this encounter was shared with the provider.    Much or all of the text in this note was generated  through the use of the Dragon Dictate voice-to-text software.Errors in spelling or words which seem out of context are unintentional. Sound alike errors, in particular, may have escaped editing.

## 2022-09-13 NOTE — PROGRESS NOTES
Diabetes Self-Management Education & Support    Presents for: Individual review    Type of Visit: In Person    How would patient like to obtain AVS?  A copy of AVS was provided to patient at conclusion of today's visit.    ASSESSMENT:    Kelley is a very pleasant 82-year-old who comes to clinic today for 6-month follow-up to review blood glucose, check A1c and assist/assess her current diabetes self-management skills.  She arrived today unaccompanied.  He is currently using the [facility] 2 personal CGM to monitor her blood glucose and had her reader with her today which was downloaded, reviewed and discussed.  Per her providers request we also clarified her current insulin dosing.  Again had caution her today about administering extra insulin within the 4-hour window of last injection.  She sometimes does not trust the process and if her blood glucose is not coming down fast enough will administer additional insulin and ultimately have a hypoglycemic episode.   She expressed interest in once again restarting Ozempic-informed her I would have to contact her PCP for permission to place the order.  I reiterated with her the importance of monitoring her blood glucose closely after starting Ozempic as her basal and bolus needs will most likely be less.  This is especially important because she does not always feel symptoms.  Instructed her to contact me if she should start to experience hypoglycemia  She shared with me today she has plans to travel to [facility] she is very excited for this.    Patient's most recent   Lab Results   Component Value Date    A1C 5.8 09/12/2022    is meeting goal of <7.0    Diabetes knowledge and skills assessment:   Patient is knowledgeable in diabetes management concepts related to: Healthy Eating, Monitoring and Healthy Coping    Continue education with the following diabetes management concepts: Healthy Eating, Being Active, Taking Medication and Reducing Risks    Based on learning assessment  above, most appropriate setting for further diabetes education would be: Individual setting.    INTERVENTIONS:    Education provided today on:  AADE Self-Care Behaviors:  Care Plan: Diabetes   Updates made by Sandrine Cornell RN since 9/15/2022 12:00 AM      Problem: HbA1C Not In Goal       Goal: Establish Regular Follow-Ups with PCP Completed 9/12/2022   This Visit's Progress: 100%      Task: Discuss with PCP the recommended timing for patient's next follow up visit(s) Completed 9/15/2022   Responsible User: Sandrine Cornell RN      Task: Discuss schedule for PCP visits with patient Completed 9/15/2022   Responsible User: Sandrine Cornell RN      Goal: Get HbA1C Level in Goal Completed 9/12/2022   This Visit's Progress: 100%      Task: Educate patient on diabetes education self-management topics Completed 9/15/2022   Responsible User: Sandrine Cornell RN      Task: Educate patient on benefits of regular glucose monitoring Completed 9/15/2022   Responsible User: Sandrine Cornell RN      Task: Discuss diabetes treatment plan with patient Completed 9/15/2022   Responsible User: Sandrine Cornell RN      Problem: Diabetes Self-Management Education Needed to Optimize Self-Care Behaviors       Goal: Understand diabetes pathophysiology and disease progression    This Visit's Progress: 80%   Note:    This has been done at previous visit - continue to review prn     Task: Provide education on diabetes pathophysiology and disease progression specfic to patient's diabetes type Completed 9/15/2022   Responsible User: Sandrine Cornell RN      Goal: Healthy Eating - follow a healthy eating pattern for diabetes    This Visit's Progress: 20%      Task: Provide education on portion control and consistency in amount, composition and timing of food intake Completed 9/15/2022   Responsible User: Sandrine Cornell RN      Task: Provide education on managing carbohydrate intake (carbohydrate counting, plate planning method, etc.)     Responsible User: Sandrine Cornell RN      Task: Provide education on weight management Completed 9/15/2022   Responsible User: Sandrine Cornell RN      Task: Provide education on heart healthy eating    Responsible User: Sandrine Cornell RN      Task: Provide education on eating out    Responsible User: Sandrine Cornell RN      Task: Develop individualized healthy eating plan with patient    Responsible User: Sandrine Cornell RN      Goal: Being Active - get regular physical activity, working up to at least 150 minutes per week    This Visit's Progress: 20%      Task: Provide education on relationship of activity to glucose and precautions to take if at risk for low glucose Completed 9/15/2022   Responsible User: Sandrine Cornell RN      Task: Discuss barriers to physical activity with patient    Responsible User: Sandrine Cornell RN      Task: Develop physical activity plan with patient    Responsible User: Sandrine Cornell RN      Task: Explore community resources including walking groups, assistance programs, and home videos    Responsible User: Sandrine Cornell RN      Goal: Monitoring - monitor glucose and ketones as directed    This Visit's Progress: 30%      Task: Provide education on continuous glucose monitoring (sensor placement, use of isela or /reader, understanding glucose trends, alerts and alarms, differences between sensor glucose and blood glucose) Completed 9/15/2022   Responsible User: Sandrine Cornell RN      Goal: Taking Medication - patient is consistently taking medications as directed    This Visit's Progress: 90%   Note:    Majority of visit was focused here today - covered dosing , timing , administration - when to refill      Task: Provide education on action of prescribed medication, including when to take and possible side effects Completed 9/15/2022   Responsible User: Sandrine Cornell RN      Task: Provide education on insulin and injectable diabetes medications,  including administration, storage, site selection and rotation for injection sites Completed 9/15/2022   Responsible User: Sandrine Cornell RN      Task: Discuss barriers to medication adherence with patient and provide management technique ideas as appropriate Completed 9/15/2022   Responsible User: Sandrine Cornell RN      Task: Provide education on frequency and refill details of medications Completed 9/15/2022   Responsible User: Sandrine Cornell RN      Goal: Problem Solving - know how to prevent and manage short-term diabetes complications    This Visit's Progress: 40%   Note:    Reviewed dangers of stacking insulin today      Task: Provide education on high blood glucose - causes, signs/symptoms, prevention and treatment Completed 9/15/2022   Responsible User: Sandrine Cornell RN      Task: Provide education on low blood glucose - causes, signs/symptoms, prevention, treatment, carrying a carbohydrate source at all times, and medical identification Completed 9/15/2022   Responsible User: Sandrine Cornell RN      Task: Provide education on safe travel with diabetes    Responsible User: Sandrine Cornell RN      Task: Provide education on how to care for diabetes on sick days    Responsible User: Sandrine Cornell RN      Task: Provide education on when to call a health care provider    Responsible User: Sandrine Cornell RN      Goal: Reducing Risks - know how to prevent and treat long-term diabetes complications       Task: Provide education on major complications of diabetes, prevention, early diagnostic measures and treatment of complications    Responsible User: Sandrine Cornell RN      Task: Provide education on recommended care for dental, eye and foot health    Responsible User: Sandrine Cornell RN      Task: Provide education on Hemoglobin A1c - goals and relationship to blood glucose levels Completed 9/15/2022   Responsible User: Sandrine Cornell RN      Task: Provide education on  recommendations for heart health - lipid levels and goals, blood pressure and goals, and aspirin therapy, if indicated    Responsible User: Sandrine Cornell RN      Goal: Healthy Coping - use available resources to cope with the challenges of managing diabetes    This Visit's Progress: 30%      Task: Discuss recognizing feelings about having diabetes Completed 9/15/2022   Responsible User: Sandrine Cornell RN      Task: Provide education on the benefits of making appropriate lifestyle changes Completed 9/15/2022   Responsible User: Sandrine Cornell RN      Task: Provide education on benefits of utilizing support systems    Responsible User: Sandrine Cornell RN      Task: Discuss methods for coping with stress    Responsible User: Sandrine Cornell RN      Task: Provide education on when to seek professional counseling    Responsible User: Sandrine Cornell RN          Opportunities for ongoing education and support in diabetes-self management were discussed. Pt verbalized understanding of concepts discussed and recommendations provided today.       Education Materials Provided:  No new materials provided today          PLAN  . Eat 3 balanced meals each day - Monitor carb intake and limit to 45-60 grams per meal  This would be equal to 3-4 choices ~  1 choice = 15 grams    Do not wait longer than 4-5 hours to eat something  Snacks limit to no more than 30 grams of carbohydrates or 2 choices  Make sure you include protein source with each meal and at bedtime - this has been shown to help with blood glucose elevations    2. Check blood sugars 3  times each day   Fasting and before meal target is 80 - 130   2 hours after a meal target is < 180  remember to bring meter and log book to all appointments    3. Incorporate 30 minutes activity into each day - does not need to be all at one time & walking counts    4. Take diabetes medications as prescribed start splitting Lantus 25 units twice daily - take 12 hours apart,  "Novolog up to 40 units with meals  NO EXTRA \"BUMPS\"!!!!-  restart Ozempic 0.25 mg weekly for 4 weeks then increase to 0.5 mg weekly     If you start to have a lot of lows decrease Novolog to 35 units with meals - send me your numbers through My Chart 3 weeks after you  start the Ozempic     Topics to cover at upcoming visits: Healthy Eating, Taking Medication, Problem Solving and Reducing Risks  Follow-up: will follow up with BG in 3 weeks via My Chart to review BG after Ozempic start, return to clinic 1/9/2023    See Goals Section for co-developed, patient-stated behavior change goals.  AVS provided to patient today.          SUBJECTIVE / OBJECTIVE:  Presents for: Individual review  Accompanied by: Self  Diabetes education in the past 24 mo: Yes  Focus of Visit: Assistance w/ making life changes, Self-care behavioral goal setting, Taking Medication, Reducing Risks  Diabetes type: Type 2  Date of diagnosis: > 10 yrs  Disease course: Stable  Transportation concerns: No  Other concerns:: None  Cultural Influences/Ethnic Background:  Not  or       Diabetes Symptoms & Complications:     Complications assessed today?: No    Patient Problem List and Family Medical History reviewed for relevant medical history, current medical status, and diabetes risk factors.    Vitals:  Wt 86.1 kg (189 lb 14.4 oz)   BMI 30.16 kg/m    Estimated body mass index is 30.16 kg/m  as calculated from the following:    Height as of 1/12/21: 1.69 m (5' 6.54\").    Weight as of this encounter: 86.1 kg (189 lb 14.4 oz).   Last 3 BP:   BP Readings from Last 3 Encounters:   08/31/22 104/60   12/10/21 122/64   01/12/21 128/67       History   Smoking Status     Never Smoker   Smokeless Tobacco     Never Used     Comment: pt states that 50yrs+, smoked for x 1 year only       Labs:  Lab Results   Component Value Date    A1C 5.8 09/12/2022     Lab Results   Component Value Date     12/10/2021     Lab Results   Component Value Date    " LDL 54 12/07/2020    LDL 82 10/14/2019     Direct Measure HDL   Date Value Ref Range Status   12/07/2020 39 (L) >=50 mg/dL Final   ]  GFR Estimate   Date Value Ref Range Status   12/10/2021 62 >60 mL/min/1.73m2 Final     Comment:     As of July 11, 2021, eGFR is calculated by the CKD-EPI creatinine equation, without race adjustment. eGFR can be influenced by muscle mass, exercise, and diet. The reported eGFR is an estimation only and is only applicable if the renal function is stable.   12/07/2020 50 (L) >60 mL/min/1.73m2 Final     GFR Estimate If Black   Date Value Ref Range Status   12/07/2020 60 (L) >60 mL/min/1.73m2 Final     Lab Results   Component Value Date    CR 0.89 12/10/2021     No results found for: MICROALBUMIN    Healthy Eating:  Healthy Eating Assessed Today: No  Cultural/Mormon diet restrictions?: No    Being Active:       Monitoring:  Monitoring Assessed Today: Yes  Did patient bring glucose meter to appointment? : Yes  Blood Glucose Meter: CGM (Liane reader)  Times checking blood sugar at home (number): 5+  Times checking blood sugar at home (per): Day  Blood glucose trend: Fluctuating    Glucose data:                Taking Medications:  Diabetes Medication(s)     Insulin       insulin aspart (NOVOLOG VIAL) 100 UNITS/ML vial    Inject 50 Units Subcutaneous 3 times daily (with meals)     LANTUS VIAL 100 UNIT/ML soln    Inject 50 Units Subcutaneous At Bedtime     Patient taking differently: Inject 25 Units Subcutaneous 2 times daily 25 units before breakfast and 25 units at bedtime    Incretin Mimetic Agents (GLP-1 Receptor Agonists)       semaglutide (OZEMPIC, 0.25 OR 0.5 MG/DOSE,) 2 MG/1.5ML SOPN pen    Inject 0.25 mg Subcutaneous every 7 days     semaglutide (OZEMPIC, 0.25 OR 0.5 MG/DOSE,) 2 MG/1.5ML SOPN pen    Inject 0.5 mg Subcutaneous once a week     Patient taking differently: Inject 0.25 mg Subcutaneous once a week 0.25 mg for 4 weeks then increase to 0.5 mg weekly        Insulin doses  were discussed and clarified with Kelley today.  Prescriptions were then sent for reorder of vials.  Taking Medication Assessed Today: Yes  Current Treatments: Insulin Injections  Dose schedule: Pre-breakfast, Pre-lunch, Pre-dinner, At bedtime  Given by: Patient  Injection/Infusion sites: Abdomen  Problems taking diabetes medications regularly?: No  Diabetes medication side effects?: No    Problem Solving:  Problem Solving Assessed Today: Yes  Is the patient at risk for hypoglycemia?: Yes  Hypoglycemia Frequency: Weekly  Hypoglycemia Treatment: Juice, Other food    Hypoglycemia symptoms  Confusion: Yes  Dizziness or Light-Headedness: Yes  Sweats: Yes  Feeling shaky: Yes         Reducing Risks:  Diabetes Risks: Age over 45 years, Sedentary Lifestyle, Hyperlipidemia  CAD Risks: Hypertension, Diabetes Mellitus, Family history, Post-menopausal, Sedentary lifestyle  Has dilated eye exam at least once a year?: Yes    Healthy Coping:  Healthy Coping Assessed Today: Yes  Emotional response to diabetes: Ready to learn, Concern for health and well-being  Stage of change: ACTION (Actively working towards change)  Support resources: In-person Offerings  Patient Activation Measure Survey Score:  No flowsheet data found.          Thank you,  Sandrine Vu RN [facility]  Certified Diabetes Care and   Visit type : DSMT      Time Spent: 60 minutes  Encounter Type: Individual        Any diabetes medication dose changes were made via the Certified Diabetes Care & Education Protocol in collaboration with the patient's referring provider and primary care provider. A copy of this encounter was shared with the provider.    Much or all of the text in this note was generated through the use of the Dragon Dictate voice-to-text software.Errors in spelling or words which seem out of context are unintentional. Sound alike errors, in particular, may have escaped editing.

## 2022-09-15 DIAGNOSIS — E11.9 TYPE 2 DIABETES MELLITUS WITHOUT COMPLICATION, WITH LONG-TERM CURRENT USE OF INSULIN (H): ICD-10-CM

## 2022-09-15 DIAGNOSIS — Z79.4 TYPE 2 DIABETES MELLITUS WITHOUT COMPLICATION, WITH LONG-TERM CURRENT USE OF INSULIN (H): ICD-10-CM

## 2022-09-15 RX ORDER — INSULIN ASPART 100 [IU]/ML
50 INJECTION, SOLUTION INTRAVENOUS; SUBCUTANEOUS
Qty: 140 ML | Refills: 3 | Status: CANCELLED | OUTPATIENT
Start: 2022-09-15

## 2022-09-15 RX ORDER — INSULIN ASPART 100 [IU]/ML
50 INJECTION, SOLUTION INTRAVENOUS; SUBCUTANEOUS
Qty: 140 ML | Refills: 3 | Status: SHIPPED | OUTPATIENT
Start: 2022-09-15 | End: 2023-02-17

## 2022-09-15 NOTE — TELEPHONE ENCOUNTER
General Call      Reason for Call:  Novalog was sent to wrong pharmacy, please resend to:  [facility] [address]   Pt requesting a 3 month supply    Pt is out of this insulin    What are your questions or concerns:  n/a    Date of last appointment with provider: n/a    Could we send this information to you in [facility] or would you prefer to receive a phone call?:   Patient would prefer a phone call   Okay to leave a detailed message?: Yes at Home number on file [phone] (home)

## 2022-10-15 ENCOUNTER — HEALTH MAINTENANCE LETTER (OUTPATIENT)
Age: 82
End: 2022-10-15

## 2022-10-25 ENCOUNTER — NURSE TRIAGE (OUTPATIENT)
Dept: NURSING | Facility: CLINIC | Age: 82
End: 2022-10-25

## 2022-10-25 NOTE — TELEPHONE ENCOUNTER
Nurse Triage SBAR    Situation:   -Nasal congestion    Background:   -Patient calling, It is okay to leave a detailed message at this number.   -Hx of Type 2 diabetis    Assessment:   -Temp has been between  (98.5-100.3 over the past 4 days)  -Temp is 98.2 now  -Covid home test is negtive  -Yellow snot  -Rare cough - feels like post nasal drop  -No Sore throat  -Sinus discomfort 2/10  -brethign is normal  -No chest pain     Recommendation:   Home care - go to  if you temp goes above 100.0  Call back if symptoms worsen or if fever last greater than 3 days    AARON OLVERA RN on 10/25/2022 at 12:23 PM    Additional Information    Negative: Sounds like a life-threatening emergency to the triager    Negative: Difficulty breathing, and not from stuffy nose (e.g., not relieved by cleaning out the nose)    Negative: SEVERE headache and has fever    Negative: Patient sounds very sick or weak to the triager    Negative: Severe headache    Negative: SEVERE sinus pain    Negative: Redness or swelling on the cheek, forehead, or around the eye    Negative: Fever > 103 F (39.4 C)    Negative: Fever > 101 F (38.3 C) and over 60 years of age    Negative: Fever > 100.0 F (37.8 C) and has diabetes mellitus or a weak immune system (e.g., HIV positive, cancer chemotherapy, organ transplant, splenectomy, chronic steroids)    Negative: Fever > 100.0 F (37.8 C) and bedridden (e.g., nursing home patient, stroke, chronic illness, recovering from surgery)    Negative: Fever present > 3 days (72 hours)    Negative: Sinus pain (not just congestion) and fever    Negative: Earache    Negative: Fever returns after gone for over 24 hours and symptoms worse or not improved    Negative: Sinus congestion (pressure, fullness) present > 10 days    Negative: Nasal discharge present > 10 days    Negative: Lots of coughing    Negative: Using nasal washes and pain medicine > 24 hours and sinus pain (lower forehead, cheekbone, or eye) persists     Negative: Patient wants to be seen    Sinus congestion as part of a cold, present < 10 days    Protocols used: SINUS PAIN AND CONGESTION-A-[place]

## 2022-12-05 ENCOUNTER — NURSE TRIAGE (OUTPATIENT)
Dept: NURSING | Facility: CLINIC | Age: 82
End: 2022-12-05

## 2022-12-05 NOTE — TELEPHONE ENCOUNTER
"Patient calling from [place].  \"Two negative Covid tests\" but not feeling well and temp 99.  Writer explained not able to triage due to licensure issues and recommended she call a local clinic/nurse line for further assistance.  Patient verbalized understanding.    Angelina Stafford RN  [facility]     "

## 2022-12-19 ENCOUNTER — OFFICE VISIT (OUTPATIENT)
Dept: INTERNAL MEDICINE | Facility: CLINIC | Age: 82
End: 2022-12-19
Payer: COMMERCIAL

## 2022-12-19 VITALS
TEMPERATURE: 97.9 F | DIASTOLIC BLOOD PRESSURE: 62 MMHG | HEIGHT: 66 IN | HEART RATE: 69 BPM | OXYGEN SATURATION: 98 % | SYSTOLIC BLOOD PRESSURE: 120 MMHG | BODY MASS INDEX: 30.53 KG/M2 | WEIGHT: 190 LBS

## 2022-12-19 DIAGNOSIS — E78.5 HYPERLIPIDEMIA LDL GOAL <100: ICD-10-CM

## 2022-12-19 DIAGNOSIS — R05.2 SUBACUTE COUGH: ICD-10-CM

## 2022-12-19 DIAGNOSIS — E11.9 TYPE 2 DIABETES, HBA1C GOAL < 8% (H): ICD-10-CM

## 2022-12-19 DIAGNOSIS — I10 ESSENTIAL HYPERTENSION: ICD-10-CM

## 2022-12-19 PROBLEM — N18.30 CHRONIC KIDNEY DISEASE, STAGE 3 (H): Status: RESOLVED | Noted: 2020-12-11 | Resolved: 2022-12-19

## 2022-12-19 PROCEDURE — 99214 OFFICE O/P EST MOD 30 MIN: CPT | Performed by: INTERNAL MEDICINE

## 2022-12-19 RX ORDER — INSULIN GLARGINE 100 [IU]/ML
47 INJECTION, SOLUTION SUBCUTANEOUS AT BEDTIME
Qty: 90 ML | Refills: 3
Start: 2022-12-19 | End: 2023-04-18

## 2022-12-19 RX ORDER — SEMAGLUTIDE 1.34 MG/ML
0.25 INJECTION, SOLUTION SUBCUTANEOUS WEEKLY
Start: 2022-12-19 | End: 2023-07-20

## 2022-12-19 NOTE — PROGRESS NOTES
ASSESSMENT AND PLAN:    1. Type 2 diabetes, HbA1C goal < 8%   Tolerating the lower dose without nausea.  Last A1c 5.8. She is mostly content with using her insulin therapy.   - semaglutide (OZEMPIC, 0.25 OR 0.5 MG/DOSE,) 2 MG/1.5ML SOPN pen; Inject 0.25 mg Subcutaneous once a week 0.25 mg weekly  - LANTUS VIAL 100 UNIT/ML soln; Inject 47 Units Subcutaneous At Bedtime  Dispense: 90 mL; Refill: 3    2. Hyperlipidemia LDL goal <100  Ongoing statin therapy.      3. Essential hypertension  Good control with lisinopril/HCTZ    4. Cough/Covid infection  Persistent but improving.  She declines CXR and is reassured that very likely her symptoms will resolve.     5. Screening for colon cancer  2105 colonoscopy - one polyp, due now. She wants to schedule soon in the next year.     6. Diastolic dysfunction  Seen on echocardiogram 5/2018, asymptomatic.     Follow up in 6 months, for annual wellness visit, and with diabetic counselor as planned.     CHIEF COMPLAINT:  Here to establish care, and review medications.     HISTORY OF PRESENT ILLNESS:  Kelley Styles is a 82 year old female here in follow up.  Recently had URI symptoms and positive Covid. The constitutional symptoms have improved but a cough persists mostly with minimal clear sputum.  No fever, or dyspnea, Taking her insulin and using her freestyle wilber is working well.  Has ozempic but isn't sure she needs it, and had nausea with the 0.5mg dose.      REVIEW OF SYSTEMS:   See HPI, all other systems on review are negative.    Past Medical History:   Diagnosis Date     Cataract      Diverticulitis 2004     Essential hypertension 12/19/2022     GERD (gastroesophageal reflux disease)      Glaucoma      Hyperlipidemia LDL goal <100 12/19/2022     Low vitamin D level 08/24/2016     Macular degeneration      Metabolic syndrome      Osteopenia      Personal history of colonic polyps     adenomatous, removed 2015 colonoscopy     Type 2 diabetes, HbA1C goal < 8% (H)  "12/19/2022     History   Smoking Status     Never   Smokeless Tobacco     Never     Comment: pt states that 52yrs+, smoked for x 1 year only     Family History   Problem Relation Age of Onset     Dementia Mother      Cerebrovascular Disease Mother      Heart Disease Mother      Heart Disease Father      Cerebrovascular Disease Father      Other - See Comments Maternal Grandmother         STROKE SYNDROM     No Known Problems Maternal Grandfather      Heart Disease Paternal Grandmother      Heart Disease Paternal Grandfather      Breast Cancer Paternal Aunt         late 70's     Obesity Paternal Aunt      No Known Problems Brother      Hereditary Breast and Ovarian Cancer Syndrome No family hx of      Cancer No family hx of      Colon Cancer No family hx of      Endometrial Cancer No family hx of      Ovarian Cancer No family hx of      Past Surgical History:   Procedure Laterality Date     APPENDECTOMY       ARTHROSCOPY KNEE       CATARACT EXTRACTION Left 01/2017     OTHER SURGICAL HISTORY      thyroid biopsy     RELEASE CARPAL TUNNEL Left      RELEASE TRIGGER FINGER Left      TONSILLECTOMY       ZZC TOTAL HIP ARTHROPLASTY      Description: Total Hip Replacement;  Recorded: 03/29/2012;     VITALS:  Vitals:    12/19/22 0951   BP: 120/62   BP Location: Right arm   Patient Position: Sitting   Cuff Size: Adult Large   Pulse: 69   Temp: 97.9  F (36.6  C)   TempSrc: Tympanic   SpO2: 98%   Weight: 86.2 kg (190 lb)   Height: 1.676 m (5' 6\")     Wt Readings from Last 3 Encounters:   12/19/22 86.2 kg (190 lb)   09/12/22 86.1 kg (189 lb 14.4 oz)   08/31/22 86.6 kg (191 lb)     PHYSICAL EXAM:  Constitutional:  In NAD, alert and oriented  Neck: no cervical or axillary adenopathy  Cardiac:  S1 S2   Lungs: Clear    DECISION TO OBTAIN OLD RECORDS AND/OR OBTAIN HISTORY FROM SOMEONE OTHER THAN PATIENT, AND/OR ACCESSING CARE EVERYWHERE):  1  0    REVIEW AND SUMMARIZATION OF OLD RECORDS, AND/OR OBTAINING HISTORY FROM SOMEONE OTHER THAN " PATIENT, AND/OR DISCUSSION OF CASE WITH ANOTHER HEALTH CARE PROVIDER:  2 reviewed past health evaluations    REVIEW AND/OR ORDER OF OF CLINICAL LAB TESTS: 1  Reviewed recent lab tests    REVIEW AND/OR ORDER OF RADIOLOGY TESTS: 1 0.    REVIEW AND/OR ORDER OF MEDICAL TESTS (EKG/ECHO/COLONOSCOPY/EGD): 1  Reviewed colonoscopy    INDEPENDENT  VISUALIZATION OF IMAGE, TRACING, OR SPECIMEN ITSELF (2 EACH):  0     TOTAL: 4    Current Outpatient Medications   Medication Sig Dispense Refill     atorvastatin (LIPITOR) 40 MG tablet TAKE 1 TABLET AT BEDTIME 90 tablet 3     blood glucose test (ONETOUCH ULTRA BLUE TEST STRIP) strips [BLOOD GLUCOSE TEST (ONETOUCH ULTRA BLUE TEST STRIP) STRIPS] USE ONE STRIP TO CHECK GLUCOSE 4 TIMES DAILY 400 strip 2     blood glucose test (ONETOUCH ULTRA TEST) strips [BLOOD GLUCOSE TEST (ONETOUCH ULTRA TEST) STRIPS] Use 1 each As Directed 4 (four) times a day. Dispense brand per patient's insurance at pharmacy discretion. (E11.9) 400 strip 3     cholecalciferol, vitamin D3, (VITAMIN D3) 50 mcg (2,000 unit) Tab [CHOLECALCIFEROL, VITAMIN D3, (VITAMIN D3) 50 MCG (2,000 UNIT) TAB] Take 1 tablet (2,000 Units total) by mouth daily. 90 tablet 3     clobetasol (TEMOVATE) 0.05 % external solution [CLOBETASOL (TEMOVATE) 0.05 % EXTERNAL SOLUTION]        COMBIGAN 0.2-0.5 % ophthalmic solution        Continuous Blood Gluc Sensor (FREESTYLE EVERARDO 2 SENSOR) MISC 1 Device every 14 days 6 each 3     flash glucose scanning reader (FREESTYLE EVERARDO 14 DAY READER) Misc [FLASH GLUCOSE SCANNING READER (FREESTYLE EVERARDO 14 DAY READER) MISC] Use 1 Device As Directed daily. 1 each 0     flash glucose sensor (FREESTYLE EVERARDO 2 SENSOR) Kit [FLASH GLUCOSE SENSOR (FREESTYLE EVERARDO 2 SENSOR) KIT] Use 1 each As Directed daily. Replace sensor every 14 days 2 kit 11     fluticasone (VERAMYST) 27.5 mcg/actuation nasal spray [FLUTICASONE (VERAMYST) 27.5 MCG/ACTUATION NASAL SPRAY] 1 spray into each nostril daily. 10 g 12     ibuprofen  "(ADVIL,MOTRIN) 600 MG tablet [IBUPROFEN (ADVIL,MOTRIN) 600 MG TABLET] TK 1 T PO Q 6 H PRN P       insulin aspart (NOVOLOG VIAL) 100 UNITS/ML vial Inject 50 Units Subcutaneous 3 times daily (with meals) 140 mL 3     insulin syringe-needle U-100 (31G X 5/16\" 1 ML) 31G X 5/16\" 1 ML miscellaneous Use 4 syringes daily or as directed. 360 each 3     insulin syringe-needle U-100 0.3 mL 31 gauge x 5/16\" Syrg [INSULIN SYRINGE-NEEDLE U-100 0.3 ML 31 GAUGE X 5/16\" SYRG] Use as directed for insulin 300 each 5     lancets (ONETOUCH DELICA LANCETS) 33 gauge Misc [LANCETS (ONETOUCH DELICA LANCETS) 33 GAUGE MISC] Use to check blood sugars four times daily 100 each 11     LANTUS VIAL 100 UNIT/ML soln Inject 47 Units Subcutaneous At Bedtime 90 mL 3     latanoprost (XALATAN) 0.005 % ophthalmic solution [LATANOPROST (XALATAN) 0.005 % OPHTHALMIC SOLUTION] Administer 1 drop to both eyes at bedtime.       lisinopril-hydrochlorothiazide (ZESTORETIC) 10-12.5 MG tablet TAKE 1 TABLET BEFORE BREAKFAST 90 tablet 3     multivitamin with minerals (THERA-M) 9 mg iron-400 mcg Tab tablet [MULTIVITAMIN WITH MINERALS (THERA-M) 9 MG IRON-400 MCG TAB TABLET] Take 1 tablet by mouth daily.       ONETOUCH ULTRA BLUE TEST STRIP strips [ONETOUCH ULTRA BLUE TEST STRIP STRIPS] USE ONE STRIP TO CHECK GLUCOSE 4 TIMES DAILY 400 strip 2     pen needle, diabetic (BD ULTRA-FINE WALTER PEN NEEDLE) 32 gauge x 5/32\" Ndle [PEN NEEDLE, DIABETIC (BD ULTRA-FINE WALTER PEN NEEDLE) 32 GAUGE X 5/32\" NDLE] Use 1 each As Directed daily. 30 each 1     semaglutide (OZEMPIC, 0.25 OR 0.5 MG/DOSE,) 2 MG/1.5ML SOPN pen Inject 0.25 mg Subcutaneous once a week 0.25 mg weekly       semaglutide (OZEMPIC, 0.25 OR 0.5 MG/DOSE,) 2 MG/1.5ML SOPN pen Inject 0.25 mg Subcutaneous every 7 days 1.5 mL 3     syringe, disposable, 20 mL Syrg [SYRINGE, DISPOSABLE, 20 ML SYRG] Ultracomfort 1 cc 20gx 1/2\" syringes, 120 each prn     Ketan Narayan MD  Internal Medicine  [facility]

## 2023-01-08 DIAGNOSIS — E11.9 TYPE 2 DIABETES, HBA1C GOAL < 8% (H): Primary | ICD-10-CM

## 2023-01-09 ENCOUNTER — LAB (OUTPATIENT)
Dept: LAB | Facility: CLINIC | Age: 83
End: 2023-01-09
Payer: COMMERCIAL

## 2023-01-09 ENCOUNTER — ALLIED HEALTH/NURSE VISIT (OUTPATIENT)
Dept: EDUCATION SERVICES | Facility: CLINIC | Age: 83
End: 2023-01-09
Payer: COMMERCIAL

## 2023-01-09 VITALS — BODY MASS INDEX: 31.44 KG/M2 | WEIGHT: 194.8 LBS

## 2023-01-09 DIAGNOSIS — E11.9 TYPE 2 DIABETES, HBA1C GOAL < 8% (H): Primary | ICD-10-CM

## 2023-01-09 DIAGNOSIS — E11.9 TYPE 2 DIABETES, HBA1C GOAL < 8% (H): ICD-10-CM

## 2023-01-09 LAB — HBA1C MFR BLD: 6.1 % (ref 0–5.6)

## 2023-01-09 PROCEDURE — 99207 PR NO CHARGE NURSE ONLY: CPT

## 2023-01-09 PROCEDURE — G0108 DIAB MANAGE TRN  PER INDIV: HCPCS

## 2023-01-09 PROCEDURE — 36415 COLL VENOUS BLD VENIPUNCTURE: CPT

## 2023-01-09 PROCEDURE — 83036 HEMOGLOBIN GLYCOSYLATED A1C: CPT

## 2023-01-09 NOTE — PATIENT INSTRUCTIONS
1. Eat 3 balanced meals each day - Monitor carb intake and limit to 45-60 grams per meal  This would be equal to 3-4 choices ~  1 choice = 15 grams    Do not wait longer than 4-5 hours to eat something  Snacks limit to no more than 30 grams of carbohydrates or 2 choices  Make sure you include protein source with each meal and at bedtime - this has been shown to help with blood glucose elevations  PROTEIN AT BEDTIME !!!!! - LIKE A BOWL OF COTTAGE CHEESE  2. Check blood sugars several times each day   Fasting and before meal target is 80 - 130   2 hours after a meal target is < 180  remember to bring meter and log book to all appointments    3. Incorporate 30 minutes activity into each day - does not need to be all at one time & walking counts    4. Take diabetes medications as prescribed Continue Lantus 40 units, and Novolog 25 units before meals  If/when you start the Ozempic back up - I want you to decrease your Lantus to 35 and and Novolog to 23 units , If / when we increase the Ozempic to 0.5 mg , we will decrease the insulins even more     Message me when you find out from [facility] about the Ozempic

## 2023-01-09 NOTE — LETTER
1/9/2023         RE: Kelley Styles  [address] [id number]        Dear Colleague,    Thank you for referring your patient, Kelley Styles, to the [facility]. Please see a copy of my visit note below.    Diabetes Self-Management Education & Support    Presents for: Individual review    Type of Service: In Person Visit    Assessment Type:   ASSESSMENT:  Kelley is a very pleasant 82-year-old who comes to clinic today for routine office visit to review blood glucose and assess/assist her with her current diabetes self-management skills.  She arrived today unaccompanied and had her [employer] reader with her which was downloaded, reviewed and discussed with her today.  Current medications were reviewed- presently she is taking approximately 4 units of Lantus daily and 25 units of Novolog with meals.  Prior to her visit today we did recheck her A1c which came back at 6.1%- slightly higher than her previous 5.8%, but we are attributing this to the holidays and the trip to [place] trip to [place] she took in this interim.  Current A1c is still below ADA goal of 7%.  We did have a discussion today regarding resuming Ozempic-she will check with her insurance formulary to see if coverage is feasible for her.     Patient's most recent   Lab Results   Component Value Date    A1C 6.1 01/09/2023     is meeting goal of <7.0    Diabetes knowledge and skills assessment:   Patient is knowledgeable in diabetes management concepts related to: Healthy Eating, Monitoring and Healthy Coping    Continue education with the following diabetes management concepts: Healthy Eating, Being Active, Taking Medication and Reducing Risks    Based on learning assessment above, most appropriate setting for further diabetes education would be: Individual setting.      PLAN  1. Eat 3 balanced meals each day - Monitor carb intake and limit to 45-60 grams per meal  This would be equal to 3-4 choices ~  1  choice = 15 grams    Do not wait longer than 4-5 hours to eat something  Snacks limit to no more than 30 grams of carbohydrates or 2 choices  Make sure you include protein source with each meal and at bedtime - this has been shown to help with blood glucose elevations  PROTEIN AT BEDTIME !!!!! - LIKE A BOWL OF COTTAGE CHEESE  2. Check blood sugars several times each day   Fasting and before meal target is 80 - 130   2 hours after a meal target is < 180  remember to bring meter and log book to all appointments    3. Incorporate 30 minutes activity into each day - does not need to be all at one time & walking counts    4. Take diabetes medications as prescribed Continue Lantus 40 units, and Novolog 25 units before meals  If/when you start the Ozempic back up - I want you to decrease your Lantus to 35 and Novolog to 23 units , If / when we increase the Ozempic to 0.5 mg , we will decrease the insulins even more     Will contact [facility] for 2023 cost on Ozempic  Topics to cover at upcoming visits: Healthy Eating, Being Active, Problem Solving and Reducing Risks    Follow-up: 4/17/2023      See Care Plan for co-developed, patient-state behavior change goals.  AVS provided for patient today.    Education Materials Provided:  No new materials provided today      SUBJECTIVE/OBJECTIVE:  Presents for: Individual review  Accompanied by: Self  Diabetes education in the past 24 mo: Yes  Focus of Visit: Assistance w/ making life changes, Self-care behavioral goal setting, Taking Medication, Healthy Eating, Being Active  Diabetes type: Type 2  Date of diagnosis: > 10 yrs  Disease course: Stable  Other concerns:: None  Cultural Influences/Ethnic Background:  Not  or       Diabetes Symptoms & Complications:  Weight trend: Stable  Complications assessed today?: No    Patient Problem List and Family Medical History reviewed for relevant medical history, current medical status, and diabetes risk factors.    Vitals:  Wt 88.4  "kg (194 lb 12.8 oz)   BMI 31.44 kg/m    Estimated body mass index is 31.44 kg/m  as calculated from the following:    Height as of 12/19/22: 1.676 m (5' 6\").    Weight as of this encounter: 88.4 kg (194 lb 12.8 oz).   Last 3 BP:   BP Readings from Last 3 Encounters:   12/19/22 120/62   08/31/22 104/60   12/10/21 122/64       History   Smoking Status     Never   Smokeless Tobacco     Never     Comment: pt states that 52yrs+, smoked for x 1 year only       Labs:  Lab Results   Component Value Date    A1C 6.1 01/09/2023     Lab Results   Component Value Date     12/10/2021     Lab Results   Component Value Date    LDL 54 12/07/2020    LDL 82 10/14/2019     Direct Measure HDL   Date Value Ref Range Status   12/07/2020 39 (L) >=50 mg/dL Final   ]  GFR Estimate   Date Value Ref Range Status   12/10/2021 62 >60 mL/min/1.73m2 Final     Comment:     As of July 11, 2021, eGFR is calculated by the CKD-EPI creatinine equation, without race adjustment. eGFR can be influenced by muscle mass, exercise, and diet. The reported eGFR is an estimation only and is only applicable if the renal function is stable.   12/07/2020 50 (L) >60 mL/min/1.73m2 Final     GFR Estimate If Black   Date Value Ref Range Status   12/07/2020 60 (L) >60 mL/min/1.73m2 Final     Lab Results   Component Value Date    CR 0.89 12/10/2021     No results found for: MICROALBUMIN    Healthy Eating:  Healthy Eating Assessed Today: Yes  Cultural/Roman Catholic diet restrictions?: No  Meal planning/habits: Smaller portions, Low carb  Meals include: Breakfast, Lunch, Dinner  Breakfast: 1 egg with 1 slice bread - coffee  Lunch: sandwich or flour tortilla crisped up in air fryer - dip greek yogurt and ranch dressing  Dinner: + protein, sometimes a veg  Snacks: homemade pickles  Other: working on increasing more vegetables in diet  Beverages: Water, Coffee, Tea (crystal light / IT)  Has patient met with a dietitian in the past?: No    Being Active:  Being Active " Assessed Today: Yes  Exercise:: Currently not exercising  Barrier to exercise: None    Monitoring:  Monitoring Assessed Today: Yes  Did patient bring glucose meter to appointment? : Yes ([employer])  Blood Glucose Meter: CGM  Times checking blood sugar at home (number): 5+  Times checking blood sugar at home (per): Day                  Blood glucose data which was downloaded from [facility] was reviewed and discussed with Kelley during her visit today.  Noted were some hypoglycemic events which we did review and discuss.  For those that occurred in the afternoon/evening it was surmised the cause was overcorrection with Novolog.  She did have a couple NOC hypoglycemic events but nothing that was consistent so I strongly recommended her to make sure she is having a protein-based snack at bedtime.    Taking Medications:  Diabetes Medication(s)     Insulin       insulin aspart (NOVOLOG VIAL) 100 UNITS/ML vial    Inject 50 Units Subcutaneous 3 times daily (with meals)     LANTUS VIAL 100 UNIT/ML soln    Inject 47 Units Subcutaneous At Bedtime    Incretin Mimetic Agents       semaglutide (OZEMPIC, 0.25 OR 0.5 MG/DOSE,) 2 MG/1.5ML SOPN pen    Inject 0.25 mg Subcutaneous every 7 days     Patient not taking: Reported on 1/9/2023     semaglutide (OZEMPIC, 0.25 OR 0.5 MG/DOSE,) 2 MG/1.5ML SOPN pen    Inject 0.25 mg Subcutaneous once a week 0.25 mg weekly          Taking Medication Assessed Today: Yes  Current Treatments: Insulin Injections  Dose schedule: Pre-breakfast, Pre-lunch, Pre-dinner, At bedtime  Given by: Patient  Injection/Infusion sites: Abdomen  Problems taking diabetes medications regularly?: No  Diabetes medication side effects?: No    Problem Solving:  Problem Solving Assessed Today: Yes  Is the patient at risk for hypoglycemia?: Yes  Hypoglycemia Frequency: Weekly  Hypoglycemia Treatment: Juice, Other food    Hypoglycemia symptoms  Confusion: Yes  Feeling shaky: Yes    Hypoglycemia Complications  Blackouts:  No  Hospitalization: No  Nocturnal hypoglycemia: Yes  Required assistance: No  Required glucagon injection: No  Seizures: No    Reducing Risks:  Reducing Risks Assessed Today: Yes  Diabetes Risks: Age over 45 years, Sedentary Lifestyle, Hyperlipidemia  CAD Risks: Sedentary lifestyle, Post-menopausal, Diabetes Mellitus, Family history, Dyslipidemia  Feet checked by healthcare provider in the last year?: Yes    Healthy Coping:  Emotional response to diabetes: Ready to learn, Concern for health and well-being  Informal Support system:: Family, Friends  Stage of change: ACTION (Actively working towards change)  Support resources: In-person Offerings  Patient Activation Measure Survey Score:  No flowsheet data found.      Care Plan and Education Provided:  Care Plan: Diabetes   Updates made by Sandrine Cornell RN since 1/10/2023 12:00 AM      Problem: HbA1C Not In Goal    Note:    A1c 6.1 % on 1/9/23     Problem: Diabetes Self-Management Education Needed to Optimize Self-Care Behaviors       Goal: Healthy Eating - follow a healthy eating pattern for diabetes    This Visit's Progress: 70%   Recent Progress: 20%   Note:    Reviewed importance of balance in meals     Task: Provide education on managing carbohydrate intake (carbohydrate counting, plate planning method, etc.) Completed 1/10/2023   Responsible User: Sandrine Cornell RN      Task: Develop individualized healthy eating plan with patient Completed 1/10/2023   Responsible User: Sandrine Cornell RN      Goal: Being Active - get regular physical activity, working up to at least 150 minutes per week    This Visit's Progress: 30%   Recent Progress: 20%      Task: Discuss barriers to physical activity with patient Completed 1/10/2023   Responsible User: Sandrine Cornell RN      Task: Develop physical activity plan with patient Completed 1/10/2023   Responsible User: Sandrine Cornell RN      Goal: Taking Medication - patient is consistently taking medications as  directed    This Visit's Progress: 80%   Recent Progress: 90%   Note:    Reviewed dosing , timing - if restart of Ozempic discussed need to monitor/adjust insulin     Goal: Healthy Coping - use available resources to cope with the challenges of managing diabetes    This Visit's Progress: On track   Recent Progress: 30%          Thank you,  Sandrine Vu RN [facility]  Certified Diabetes Care and   Visit type : DSMT        Time Spent: 30 minutes  Encounter Type: Individual    Any diabetes medication dose changes were made via the CDE Protocol per the patient's referring provider and primary care provider. A copy of this encounter was shared with the provider.    Much or all of the text in this note was generated through the use of the Dragon Dictate voice-to-text software.Errors in spelling or words which seem out of context are unintentional. Sound alike errors, in particular, may have escaped editing.

## 2023-01-10 NOTE — PROGRESS NOTES
Diabetes Self-Management Education & Support    Presents for: Individual review    Type of Service: In Person Visit    Assessment Type:   ASSESSMENT:  Kelley is a very pleasant 82-year-old who comes to clinic today for routine office visit to review blood glucose and assess/assist her with her current diabetes self-management skills.  She arrived today unaccompanied and had her [employer] reader with her which was downloaded, reviewed and discussed with her today.  Current medications were reviewed- presently she is taking approximately 40 units of Lantus daily and 25 units of Novolog with meals.  Prior to her visit today we did recheck her A1c which came back at 6.1%- slightly higher than her previous 5.8%, but we are attributing this to the holidays and the trip to [place] trip to [place] she took in this interim.  Current A1c is still below ADA goal of 7%.  We did have a discussion today regarding resuming Ozempic-she will check with her insurance formulary to see if coverage is feasible for her.     Patient's most recent   Lab Results   Component Value Date    A1C 6.1 01/09/2023     is meeting goal of <7.0    Diabetes knowledge and skills assessment:   Patient is knowledgeable in diabetes management concepts related to: Healthy Eating, Monitoring and Healthy Coping    Continue education with the following diabetes management concepts: Healthy Eating, Being Active, Taking Medication and Reducing Risks    Based on learning assessment above, most appropriate setting for further diabetes education would be: Individual setting.      PLAN  1. Eat 3 balanced meals each day - Monitor carb intake and limit to 45-60 grams per meal  This would be equal to 3-4 choices ~  1 choice = 15 grams    Do not wait longer than 4-5 hours to eat something  Snacks limit to no more than 30 grams of carbohydrates or 2 choices  Make sure you include protein source with each meal and at bedtime - this has been shown to help with blood glucose  "elevations  PROTEIN AT BEDTIME !!!!! - LIKE A BOWL OF COTTAGE CHEESE  2. Check blood sugars several times each day   Fasting and before meal target is 80 - 130   2 hours after a meal target is < 180  remember to bring meter and log book to all appointments    3. Incorporate 30 minutes activity into each day - does not need to be all at one time & walking counts    4. Take diabetes medications as prescribed Continue Lantus 40 units, and Novolog 25 units before meals  If/when you start the Ozempic back up - I want you to decrease your Lantus to 35 and Novolog to 23 units , If / when we increase the Ozempic to 0.5 mg , we will decrease the insulins even more     Will contact [facility] for 2023 cost on Ozempic  Topics to cover at upcoming visits: Healthy Eating, Being Active, Problem Solving and Reducing Risks    Follow-up: 4/17/2023      See Care Plan for co-developed, patient-state behavior change goals.  AVS provided for patient today.    Education Materials Provided:  No new materials provided today      SUBJECTIVE/OBJECTIVE:  Presents for: Individual review  Accompanied by: Self  Diabetes education in the past 24 mo: Yes  Focus of Visit: Assistance w/ making life changes, Self-care behavioral goal setting, Taking Medication, Healthy Eating, Being Active  Diabetes type: Type 2  Date of diagnosis: > 10 yrs  Disease course: Stable  Other concerns:: None  Cultural Influences/Ethnic Background:  Not  or       Diabetes Symptoms & Complications:  Weight trend: Stable  Complications assessed today?: No    Patient Problem List and Family Medical History reviewed for relevant medical history, current medical status, and diabetes risk factors.    Vitals:  Wt 88.4 kg (194 lb 12.8 oz)   BMI 31.44 kg/m    Estimated body mass index is 31.44 kg/m  as calculated from the following:    Height as of 12/19/22: 1.676 m (5' 6\").    Weight as of this encounter: 88.4 kg (194 lb 12.8 oz).   Last 3 BP:   BP Readings from Last 3 " Encounters:   12/19/22 120/62   08/31/22 104/60   12/10/21 122/64       History   Smoking Status     Never   Smokeless Tobacco     Never     Comment: pt states that 50yrs+, smoked for x 1 year only       Labs:  Lab Results   Component Value Date    A1C 6.1 01/09/2023     Lab Results   Component Value Date     12/10/2021     Lab Results   Component Value Date    LDL 54 12/07/2020    LDL 82 10/14/2019     Direct Measure HDL   Date Value Ref Range Status   12/07/2020 39 (L) >=50 mg/dL Final   ]  GFR Estimate   Date Value Ref Range Status   12/10/2021 62 >60 mL/min/1.73m2 Final     Comment:     As of July 11, 2021, eGFR is calculated by the CKD-EPI creatinine equation, without race adjustment. eGFR can be influenced by muscle mass, exercise, and diet. The reported eGFR is an estimation only and is only applicable if the renal function is stable.   12/07/2020 50 (L) >60 mL/min/1.73m2 Final     GFR Estimate If Black   Date Value Ref Range Status   12/07/2020 60 (L) >60 mL/min/1.73m2 Final     Lab Results   Component Value Date    CR 0.89 12/10/2021     No results found for: MICROALBUMIN    Healthy Eating:  Healthy Eating Assessed Today: Yes  Cultural/Alevism diet restrictions?: No  Meal planning/habits: Smaller portions, Low carb  Meals include: Breakfast, Lunch, Dinner  Breakfast: 1 egg with 1 slice bread - coffee  Lunch: sandwich or flour tortilla crisped up in air fryer - dip greek yogurt and ranch dressing  Dinner: + protein, sometimes a veg  Snacks: homemade pickles  Other: working on increasing more vegetables in diet  Beverages: Water, Coffee, Tea (crystal light / IT)  Has patient met with a dietitian in the past?: No    Being Active:  Being Active Assessed Today: Yes  Exercise:: Currently not exercising  Barrier to exercise: None    Monitoring:  Monitoring Assessed Today: Yes  Did patient bring glucose meter to appointment? : Yes (Liane valenzuela)  Blood Glucose Meter: CGM  Times checking blood sugar at home  (number): 5+  Times checking blood sugar at home (per): Day                  Blood glucose data which was downloaded from [employer] was reviewed and discussed with Kelley during her visit today.  Noted were some hypoglycemic events which we did review and discuss.  For those that occurred in the afternoon/evening it was surmised the cause was overcorrection with Novolog.  She did have a couple NOC hypoglycemic events but nothing that was consistent so I strongly recommended her to make sure she is having a protein-based snack at bedtime.    Taking Medications:  Diabetes Medication(s)     Insulin       insulin aspart (NOVOLOG VIAL) 100 UNITS/ML vial    Inject 50 Units Subcutaneous 3 times daily (with meals)     LANTUS VIAL 100 UNIT/ML soln    Inject 47 Units Subcutaneous At Bedtime    Incretin Mimetic Agents       semaglutide (OZEMPIC, 0.25 OR 0.5 MG/DOSE,) 2 MG/1.5ML SOPN pen    Inject 0.25 mg Subcutaneous every 7 days     Patient not taking: Reported on 1/9/2023     semaglutide (OZEMPIC, 0.25 OR 0.5 MG/DOSE,) 2 MG/1.5ML SOPN pen    Inject 0.25 mg Subcutaneous once a week 0.25 mg weekly          Taking Medication Assessed Today: Yes  Current Treatments: Insulin Injections  Dose schedule: Pre-breakfast, Pre-lunch, Pre-dinner, At bedtime  Given by: Patient  Injection/Infusion sites: Abdomen  Problems taking diabetes medications regularly?: No  Diabetes medication side effects?: No    Problem Solving:  Problem Solving Assessed Today: Yes  Is the patient at risk for hypoglycemia?: Yes  Hypoglycemia Frequency: Weekly  Hypoglycemia Treatment: Juice, Other food    Hypoglycemia symptoms  Confusion: Yes  Feeling shaky: Yes    Hypoglycemia Complications  Blackouts: No  Hospitalization: No  Nocturnal hypoglycemia: Yes  Required assistance: No  Required glucagon injection: No  Seizures: No    Reducing Risks:  Reducing Risks Assessed Today: Yes  Diabetes Risks: Age over 45 years, Sedentary Lifestyle, Hyperlipidemia  CAD Risks:  Sedentary lifestyle, Post-menopausal, Diabetes Mellitus, Family history, Dyslipidemia  Feet checked by healthcare provider in the last year?: Yes    Healthy Coping:  Emotional response to diabetes: Ready to learn, Concern for health and well-being  Informal Support system:: Family, Friends  Stage of change: ACTION (Actively working towards change)  Support resources: In-person Offerings  Patient Activation Measure Survey Score:  No flowsheet data found.      Care Plan and Education Provided:  Care Plan: Diabetes   Updates made by Sandrine Cornell RN since 1/10/2023 12:00 AM      Problem: HbA1C Not In Goal    Note:    A1c 6.1 % on 1/9/23     Problem: Diabetes Self-Management Education Needed to Optimize Self-Care Behaviors       Goal: Healthy Eating - follow a healthy eating pattern for diabetes    This Visit's Progress: 70%   Recent Progress: 20%   Note:    Reviewed importance of balance in meals     Task: Provide education on managing carbohydrate intake (carbohydrate counting, plate planning method, etc.) Completed 1/10/2023   Responsible User: Sandrine Cornell RN      Task: Develop individualized healthy eating plan with patient Completed 1/10/2023   Responsible User: Sandrine Cornell RN      Goal: Being Active - get regular physical activity, working up to at least 150 minutes per week    This Visit's Progress: 30%   Recent Progress: 20%      Task: Discuss barriers to physical activity with patient Completed 1/10/2023   Responsible User: Sandrine Cornell RN      Task: Develop physical activity plan with patient Completed 1/10/2023   Responsible User: Sandrine Cornell RN      Goal: Taking Medication - patient is consistently taking medications as directed    This Visit's Progress: 80%   Recent Progress: 90%   Note:    Reviewed dosing , timing - if restart of Ozempic discussed need to monitor/adjust insulin     Goal: Healthy Coping - use available resources to cope with the challenges of managing diabetes     This Visit's Progress: On track   Recent Progress: 30%          Thank you,  Sandrine Vu RN [facility]  Certified Diabetes Care and   Visit type : DSMT        Time Spent: 30 minutes  Encounter Type: Individual    Any diabetes medication dose changes were made via the CDE Protocol per the patient's referring provider and primary care provider. A copy of this encounter was shared with the provider.    Much or all of the text in this note was generated through the use of the Dragon Dictate voice-to-text software.Errors in spelling or words which seem out of context are unintentional. Sound alike errors, in particular, may have escaped editing.

## 2023-02-17 DIAGNOSIS — Z79.4 TYPE 2 DIABETES MELLITUS WITHOUT COMPLICATION, WITH LONG-TERM CURRENT USE OF INSULIN (H): ICD-10-CM

## 2023-02-17 DIAGNOSIS — E78.00 HYPERCHOLESTEROLEMIA: ICD-10-CM

## 2023-02-17 DIAGNOSIS — E11.9 TYPE 2 DIABETES MELLITUS WITHOUT COMPLICATION, WITH LONG-TERM CURRENT USE OF INSULIN (H): ICD-10-CM

## 2023-02-17 DIAGNOSIS — Z76.0 ENCOUNTER FOR MEDICATION REFILL: ICD-10-CM

## 2023-02-17 RX ORDER — INSULIN ASPART 100 [IU]/ML
50 INJECTION, SOLUTION INTRAVENOUS; SUBCUTANEOUS
Qty: 140 ML | Refills: 3 | Status: SHIPPED | OUTPATIENT
Start: 2023-02-17 | End: 2024-01-24

## 2023-02-17 NOTE — PROGRESS NOTES
Progress Notes by Sandrine Cornell RN at 5/3/2021  3:00 PM     Author: Sandrine Cornell RN Service: -- Author Type: Registered Nurse    Filed: 5/10/2021  3:17 PM Encounter Date: 5/3/2021 Status: Attested    : Sandrine Cornell RN (Registered Nurse) Cosigner: Harmony Ireland DO at 5/10/2021  3:23 PM    Attestation signed by Harmony Ireland DO at 5/10/2021  3:23 PM    I have reviewed the notes, assessments, and/or procedures performed by Sandrine, I concur with her/his documentation of Kelley FAN Lolatanvi.    Harmony Ireland D.O.  [facility]                  Assessment: Kelley Styles is a  80 y.o.who presents today for follow up and review her BG data from her personal wilber CGM and assess her current self diabetes management skills specifically regarding insulin administration with A1c presently not at ADA goal of <7.0 Kelley arrived alone and her CGM data was downloaded and discussed.  Unfortunately her first sensor fell off prematurely, she replaced it with a new sensor and that too became semi unattached while she was dressing and was no longer collecting data.  Instructed her to contact [facility] directly to report problem and informed her that they typically will mail out a replacement sensor in these types of cases.  I also had a wilber sensor sample at the [facility]-told her I would bring to her next Monday.  In the meantime she was agreeable to doing fingersticks.  We were fortunately able to collect 6 days of BG data today 4/27 through 5/2.. Kelley also brought in today the syringes she is currently using (1 mL - 100 units) and I had her demonstrate to me using a saline vial exactly how much insulin she is dosing and administering-both Lantus and Novolog.    Diabetes Risk Factors:   age over 45 years, hypertension, overweight/obesity and inactivity    Relevant complications, co-morbidities and related health problems:  Significant for:  hypertension and macular  "degeneration    Current diabetes medications: Per her demonstration today Kelley is administering between 90 to 100 units of Lantus SC at 10 PM daily and anywhere from  units of Novolog before meals.  When I asked her how she is determining her dose amount pre meal,  she responded she will use her blood glucose number and take into consideration the amount and types of foods she will be eating for that meal.  Due to her macular degeneration is extremely difficult for her to see the lines on the syringe.  BG Summary: The following are portions of Kelley's CGM summary reports that were downloaded, printed and reviewed today.         These reports were reviewed and discussed with Kelley during our visit today. Per her AGP report Kelley's Time in Range was 86 % (<70% is target) She had no \"Very High\" readings above 250 mg/dL and she had 1% low and 1% very low.  The lowest scanned glucose Kelley had on her reports was 63 mg/dL which occurred at approximately 10:45 AM on 4/28. -Her next scan at 11:45 AM was 171 mg/dL.  Overall the blood sugars of Breannas that were reviewed today appear to be relatively stable despite her methadone dosing-her a.m. fastings were all in target range as were her readings at bedtime.  We will continue to closely monitor her for the next 4 to 6 weeks and agreed she will decrease Lantus to 90 units and Novolog to no more than 75 units.  Reports were also shown to Dr. Ireland and Kiya Hannah CNP with Endocrinology and discussed with them both.    Nutrition: Continues to eat 3 meals daily-diet has not significantly changed since our last visit a week ago.     Activity: No changes from previous assessment    Plan: Check blood sugars at least 3-6 times each day-reminded her that when she is using the sensors she needs to scan at least once every 8 hours to keep sensors active, Kelley was reminded to bring meter and log book to all follow up appointments for review. Eat three balanced meals each day " following the parameters for intake for all meals and snacks from ADA guidelines. Keep active - goal being 30 minutes daily of moderate activity. Medications: Decrease Lantus to 90 units and give no more than 75 units before meals.   Follow up has been scheduled for 5/24. She was instructed to call with any questions/concerns that may come up before then.      Subjective and Objective:      Kelley FAN Raheemalis has been referred by Dr. Ireland for Diabetes Education.     Lab Results   Component Value Date    HGBA1C 7.2 (H) 12/07/2020         Wt 217 lb 9.6 oz (98.7 kg)   BMI 34.55 kg/m      Goals        General    ? Monitor      Will monitor the amount of insulin administered before each meal and at bedtime and record in logbook provided at visit today  4/27/2021           Other    ? Plan meals (pt-stated)      Kelley will keep food records while wearing the [employer] Liane glucose sensor-    She agreed to keep 3-5 days worth of food logs before next appt.  4/27/2021              Follow up:   CDE (certified diabetic educator)    Education:     Monitoring   Meter (per above goals): Assessed and Discussed  Monitoring: Assessed and Discussed  BG goals: Assessed and Discussed    Nutrition Management  Nutrition Management: Discussed  Weight: Assessed and Discussed  Portions/Balance: Discussed  Carb ID/Count: Not addressed  Label Reading: Not addressed  Heart Healthy Fats: Not addressed  Menu Planning: Discussed  Dining Out: Not addressed  Physical Activity: Discussed  Medications: Assessed and Discussed  Orals: Assessed and Discussed  Injected Medications: Assessed and Discussed   Storage/Exp:Discussed   Site Rotation: Discussed   Sites Assessed: no    Diabetes Disease Process: Assessed and Discussed    Acute Complications: Prevent, Detect, Treat:  Hypoglycemia: Assessed and Discussed  Hyperglycemia: Assessed and Discussed  Sick Days: Not addressed  Driving: Not addressed    Chronic Complications-discussed    Goal Setting and  Problem Solving: Assessed and Discussed  Barriers: Assessed and Discussed  Psychosocial Adjustments: Assessed and Discussed      Time spent with the patient: 60 minutes for diabetes education and counseling.   Previous Education: yes  Visit Type: DSMT        Sandrine Cornell RN [facility]  Diabetes Education  5/9/2021    Much or all of the text in this note was generated through the use of the Dragon Dictate voice-to-text software. Errors in spelling or words which seem out of context are unintentional. Sound alike errors, in particular, may have escaped editing.  Any diabetes medication dose changes were made via the CDE Protocol. A copy of this encounter was shared with the provider.        DISCHARGE

## 2023-02-17 NOTE — TELEPHONE ENCOUNTER
"Former patient of Yadiel and Afsaneh & has not established care with another provider.  Please assign refill request to covering provider per clinic standard process.    Routing refill request to provider for review/approval because:  Labs not current:  Creatinine     Last Written Prescription Date:  9/15/22  Last Fill Quantity: 140ml,  # refills: 3   Last office visit provider:  8/31/22     Requested Prescriptions   Pending Prescriptions Disp Refills     insulin aspart (NOVOLOG VIAL) 100 UNITS/ML vial 140 mL 3     Sig: Inject 50 Units Subcutaneous 3 times daily (with meals)       Short Acting Insulin Protocol Failed - 2/17/2023  1:18 PM        Failed - Serum creatinine on file in past 12 months     Recent Labs   Lab Test 12/10/21  1120   CR 0.89       Ok to refill medication if creatinine is low          Passed - HgbA1C in past 3 or 6 months     If HgbA1C is 8 or greater, it needs to be on file within the past 3 months.  If less than 8, must be on file within the past 6 months.     Recent Labs   Lab Test 01/09/23  1154   A1C 6.1*             Passed - Medication is active on med list        Passed - Patient is age 18 or older        Passed - Recent (6 mo) or future (30 days) visit within the authorizing provider's specialty     Patient had office visit in the last 6 months or has a visit in the next 30 days with authorizing provider or within the authorizing provider's specialty.  See \"Patient Info\" tab in inbasket, or \"Choose Columns\" in Meds & Orders section of the refill encounter.                 AARON OLVERA RN 02/17/23 1:19 PM  "

## 2023-02-17 NOTE — TELEPHONE ENCOUNTER
Pt states that she is now a patient of Dr. Narayan, and needs Dr. Narayan as the provider on the script, for [facility]

## 2023-02-19 NOTE — TELEPHONE ENCOUNTER
"Routing refill request to provider for review/approval because:  Labs not current:  LDL from 2020    Last Written Prescription Date:  2/9/22  Last Fill Quantity: 90,  # refills: 3   Last office visit provider:   12/19/22    Requested Prescriptions   Pending Prescriptions Disp Refills     atorvastatin (LIPITOR) 40 MG tablet [Pharmacy Med Name: ATORVASTATIN TABS 40MG] 90 tablet 3     Sig: TAKE 1 TABLET AT BEDTIME       Statins Protocol Failed - 2/17/2023 10:35 AM        Failed - LDL on file in past 12 months     Recent Labs   Lab Test 12/07/20  1416   LDL 54             Failed - Recent (12 mo) or future (30 days) visit within the authorizing provider's specialty     Patient has had an office visit with the authorizing provider or a provider within the authorizing providers department within the previous 12 mos or has a future within next 30 days. See \"Patient Info\" tab in inbasket, or \"Choose Columns\" in Meds & Orders section of the refill encounter.              Passed - No abnormal creatine kinase in past 12 months     No lab results found.             Passed - Medication is active on med list        Passed - Patient is age 18 or older        Passed - No active pregnancy on record        Passed - No positive pregnancy test in past 12 months             Kathrine Stewart RN 02/19/23 2:16 AM  "

## 2023-02-20 RX ORDER — ATORVASTATIN CALCIUM 40 MG/1
TABLET, FILM COATED ORAL
Qty: 90 TABLET | Refills: 3 | Status: SHIPPED | OUTPATIENT
Start: 2023-02-20 | End: 2024-01-24

## 2023-03-13 ENCOUNTER — TELEPHONE (OUTPATIENT)
Dept: INTERNAL MEDICINE | Facility: CLINIC | Age: 83
End: 2023-03-13
Payer: COMMERCIAL

## 2023-03-13 DIAGNOSIS — I10 ESSENTIAL HYPERTENSION: Primary | ICD-10-CM

## 2023-03-13 RX ORDER — LISINOPRIL/HYDROCHLOROTHIAZIDE 10-12.5 MG
1 TABLET ORAL DAILY
Qty: 90 TABLET | Refills: 3 | Status: SHIPPED | OUTPATIENT
Start: 2023-03-13 | End: 2023-07-20

## 2023-03-13 NOTE — TELEPHONE ENCOUNTER
Medication Question or Refill    Contacts       Type Contact Phone/Fax    03/13/2023 08:11 AM CDT Phone (Incoming) Kelley Styles (Self) [phone] (H)          What medication are you calling about (include dose and sig)?:    Disp Refills Start End TONI   lisinopril-hydrochlorothiazide (ZESTORETIC) 10-12.5 MG tablet 90 tablet 3 2/21/2022  No   Sig: TAKE 1 TABLET BEFORE BREAKFAST   Sent to pharmacy as: Lisinopril-hydroCHLOROthiazide 10-12.5 MG Oral Tablet (ZESTORETIC)   Class: E-Prescribe   Order: [id number]   E-Prescribing Status: Receipt confirmed by pharmacy (2/21/2022 11:33 AM CST)         Preferred Pharmacy:   [facility] [place] [address]  Phone: [phone] Fax: [phone]      Controlled Substance Agreement on file:   CSA -- Patient Level:    CSA: None found at the patient level.       Who prescribed the medication?: Dr Ireland did last    Do you need a refill? Yes    When did you use the medication last? today    Patient offered an appointment? No    Do you have any questions or concerns?  No      Could we send this information to you in [facility] or would you prefer to receive a phone call?:   Patient would prefer a phone call   Okay to leave a detailed message?: Yes at Home number on file [phone] (home)

## 2023-03-26 ENCOUNTER — HEALTH MAINTENANCE LETTER (OUTPATIENT)
Age: 83
End: 2023-03-26

## 2023-03-30 DIAGNOSIS — Z96.642 HISTORY OF LEFT HIP REPLACEMENT: ICD-10-CM

## 2023-03-30 DIAGNOSIS — M25.559 HIP PAIN: Primary | ICD-10-CM

## 2023-04-14 ENCOUNTER — MEDICAL CORRESPONDENCE (OUTPATIENT)
Dept: HEALTH INFORMATION MANAGEMENT | Facility: CLINIC | Age: 83
End: 2023-04-14
Payer: COMMERCIAL

## 2023-04-14 ENCOUNTER — TRANSFERRED RECORDS (OUTPATIENT)
Dept: HEALTH INFORMATION MANAGEMENT | Facility: CLINIC | Age: 83
End: 2023-04-14
Payer: COMMERCIAL

## 2023-04-17 ENCOUNTER — LAB (OUTPATIENT)
Dept: LAB | Facility: CLINIC | Age: 83
End: 2023-04-17
Payer: COMMERCIAL

## 2023-04-17 DIAGNOSIS — Z96.642 HISTORY OF LEFT HIP REPLACEMENT: ICD-10-CM

## 2023-04-17 DIAGNOSIS — M25.559 HIP PAIN: ICD-10-CM

## 2023-04-17 LAB
CRP SERPL-MCNC: <3 MG/L
ERYTHROCYTE [SEDIMENTATION RATE] IN BLOOD BY WESTERGREN METHOD: 8 MM/HR (ref 0–20)

## 2023-04-17 PROCEDURE — 85652 RBC SED RATE AUTOMATED: CPT

## 2023-04-17 PROCEDURE — 86140 C-REACTIVE PROTEIN: CPT

## 2023-04-17 PROCEDURE — 99000 SPECIMEN HANDLING OFFICE-LAB: CPT

## 2023-04-17 PROCEDURE — 82495 ASSAY OF CHROMIUM: CPT | Mod: 90

## 2023-04-17 PROCEDURE — 36415 COLL VENOUS BLD VENIPUNCTURE: CPT

## 2023-04-17 PROCEDURE — 83018 HEAVY METAL QUAN EACH NES: CPT | Mod: 90

## 2023-04-18 DIAGNOSIS — E11.9 TYPE 2 DIABETES, HBA1C GOAL < 8% (H): ICD-10-CM

## 2023-04-18 RX ORDER — INSULIN GLARGINE 100 [IU]/ML
47 INJECTION, SOLUTION SUBCUTANEOUS AT BEDTIME
Qty: 90 ML | Refills: 3 | Status: SHIPPED | OUTPATIENT
Start: 2023-04-18 | End: 2023-08-08

## 2023-04-19 LAB
COBALT SERPL-MCNC: 1.5 UG/L
CR SERPL-MCNC: 1.9 UG/L

## 2023-05-24 ENCOUNTER — ANCILLARY PROCEDURE (OUTPATIENT)
Dept: MAMMOGRAPHY | Facility: CLINIC | Age: 83
End: 2023-05-24
Attending: INTERNAL MEDICINE
Payer: COMMERCIAL

## 2023-05-24 DIAGNOSIS — Z12.31 SCREENING MAMMOGRAM FOR BREAST CANCER: ICD-10-CM

## 2023-05-24 PROCEDURE — 77067 SCR MAMMO BI INCL CAD: CPT | Mod: TC | Performed by: RADIOLOGY

## 2023-06-02 DIAGNOSIS — E11.9 TYPE 2 DIABETES, HBA1C GOAL < 8% (H): Primary | ICD-10-CM

## 2023-06-18 DIAGNOSIS — E11.9 TYPE 2 DIABETES, HBA1C GOAL < 8% (H): Primary | ICD-10-CM

## 2023-06-19 ENCOUNTER — ALLIED HEALTH/NURSE VISIT (OUTPATIENT)
Dept: EDUCATION SERVICES | Facility: CLINIC | Age: 83
End: 2023-06-19
Payer: COMMERCIAL

## 2023-06-19 ENCOUNTER — LAB (OUTPATIENT)
Dept: LAB | Facility: CLINIC | Age: 83
End: 2023-06-19
Payer: COMMERCIAL

## 2023-06-19 DIAGNOSIS — E11.9 TYPE 2 DIABETES, HBA1C GOAL < 8% (H): ICD-10-CM

## 2023-06-19 DIAGNOSIS — E11.9 TYPE 2 DIABETES, HBA1C GOAL < 8% (H): Primary | ICD-10-CM

## 2023-06-19 LAB — HBA1C MFR BLD: 6.1 % (ref 0–5.6)

## 2023-06-19 PROCEDURE — 36415 COLL VENOUS BLD VENIPUNCTURE: CPT

## 2023-06-19 PROCEDURE — G0108 DIAB MANAGE TRN  PER INDIV: HCPCS | Mod: AE

## 2023-06-19 PROCEDURE — 83036 HEMOGLOBIN GLYCOSYLATED A1C: CPT

## 2023-06-19 NOTE — PATIENT INSTRUCTIONS
1. Eat 3 balanced meals each day - Monitor carb intake and limit to 45-60 grams per meal  This would be equal to 3-4 choices ~  1 choice = 15 grams    Do not wait longer than 4-5 hours to eat something  Snacks limit to no more than 30 grams of carbohydrates or 2 choices  Make sure you include protein source with each meal and at bedtime - this has been shown to help with blood glucose elevations    2. Check blood sugars several  times each day   Fasting and before meal target is 80 - 130   2 hours after a meal target is < 180  remember to bring meter and log book to all appointments    3. Incorporate 30 minutes activity into each day - does not need to be all at one time & walking counts    4. Take diabetes medications as prescribed Lantus 40 units every evening, 27 units of Novolog before meals - if your blood glucose is above 200 before you eat, okay to do 30 units    A1c 6.1% today - KEEP IT UP !!!

## 2023-06-19 NOTE — LETTER
6/19/2023         RE: Kelley Styles  [address] [id number]        Dear Colleague,    Thank you for referring your patient, Kelley Styles, to the [facility]. Please see a copy of my visit note below.        Diabetes Self-Management Education & Support    Presents for: Individual review    Type of Service: In Person Visit    Assessment Type:   ASSESSMENT:  Kelley is a very pleasant 82-year-old who returns to clinic today to review blood glucose, medications and assess/assist with her diabetes management skills with an A1c not at goal.  She arrived today unaccompanied.  He is currently monitoring her blood glucose using a personal CGM-wilber 2-with the reader.  She has reader with her today which was downloaded and reviewed to review and discuss most recent blood glucose data.  Medications were reviewed and per her report she is currently taking 30 to 50 units of Lantus SC daily, and Novolog with meals-25 to 30 units.  Additionally she told me she will inject  20 units if she has a snack. Kelley expressed frustrations that she has been having with the wilber 2 sensors.  She had call [employer] customer service line several times in regards to sensors not recognizing her meter, or informing  her 2 days after application of new sensor it had to be replaced.  Informed her that this was not an isolated incident, it was not the first patient to express the same frustrations.  Shared with her the response I had gotten from the wilber [employer] rep when I discussed these occurences.  Instructed her to make sure she always tries to keep well-hydrated and to try places her in a spot will not be merissa or disturbed.  Prior to her appointment today we did have her A1c rechecked and please assay remains at 6.1%.  Overall Kelley is doing well, she is eating 3 meals daily which appear to be well-balanced and eaten in the fashion.  There was minor changes made to her current insulin doses when  we reviewed her glucose data.  I will see her again in December for follow-up and recheck A1c and she was instructed call with any questions or concerns that might come up before then.  Encouraged her to work on increasing activity level.    Patient's most recent   Lab Results   Component Value Date    A1C 6.1 06/19/2023     is meeting goal of <7.0    Diabetes knowledge and skills assessment:   Patient is knowledgeable in diabetes management concepts related to: Healthy Eating, Monitoring, Problem Solving and Healthy Coping    Continue education with the following diabetes management concepts: Healthy Eating, Being Active, Taking Medication, Problem Solving and Reducing Risks    Based on learning assessment above, most appropriate setting for further diabetes education would be: Individual setting.      PLAN  1. Eat 3 balanced meals each day - Monitor carb intake and limit to 45-60 grams per meal  This would be equal to 3-4 choices ~  1 choice = 15 grams    Do not wait longer than 4-5 hours to eat something  Snacks limit to no more than 30 grams of carbohydrates or 2 choices  Make sure you include protein source with each meal and at bedtime - this has been shown to help with blood glucose elevations    2. Check blood sugars several  times each day   Fasting and before meal target is 80 - 130   2 hours after a meal target is < 180  remember to bring meter and log book to all appointments    3. Incorporate 30 minutes activity into each day - does not need to be all at one time & walking counts    4. Take diabetes medications as prescribed Lantus 40 units every evening, 27 units of Novolog before meals - if your blood glucose is above 200 before you eat, okay to do 30 units      Topics to cover at upcoming visits: Healthy Eating, Being Active, Taking Medication, Problem Solving and Reducing Risks    Follow-up: 12/11/23    See Care Plan for co-developed, patient-state behavior change goals.  AVS provided for patient  "today.    Education Materials Provided:  No new materials provided today      SUBJECTIVE/OBJECTIVE:  Presents for: Individual review  Accompanied by: Self  Diabetes education in the past 24 mo: Yes ([facility] 1/9/23)  Focus of Visit: Assistance w/ making life changes, Self-care behavioral goal setting, Taking Medication, Healthy Eating, Being Active  Diabetes type: Type 2  Date of diagnosis: > 10 yrs  Disease course: Stable  Other concerns:: None  Cultural Influences/Ethnic Background:  Not  or       Diabetes Symptoms & Complications:  Weight trend: Stable  Complications assessed today?: No    Patient Problem List and Family Medical History reviewed for relevant medical history, current medical status, and diabetes risk factors.    Vitals:  There were no vitals taken for this visit.  Estimated body mass index is 31.44 kg/m  as calculated from the following:    Height as of 12/19/22: 1.676 m (5' 6\").    Weight as of 1/9/23: 88.4 kg (194 lb 12.8 oz).   Last 3 BP:   BP Readings from Last 3 Encounters:   12/19/22 120/62   08/31/22 104/60   12/10/21 122/64       History   Smoking Status     Never   Smokeless Tobacco     Never     Comment: pt states that 52yrs+, smoked for x 1 year only       Labs:  Lab Results   Component Value Date    A1C 6.1 06/19/2023     Lab Results   Component Value Date     12/10/2021     Lab Results   Component Value Date    LDL 54 12/07/2020    LDL 82 10/14/2019     Direct Measure HDL   Date Value Ref Range Status   12/07/2020 39 (L) >=50 mg/dL Final   ]  GFR Estimate   Date Value Ref Range Status   12/10/2021 62 >60 mL/min/1.73m2 Final     Comment:     As of July 11, 2021, eGFR is calculated by the CKD-EPI creatinine equation, without race adjustment. eGFR can be influenced by muscle mass, exercise, and diet. The reported eGFR is an estimation only and is only applicable if the renal function is stable.   12/07/2020 50 (L) >60 mL/min/1.73m2 Final     GFR Estimate If Black   Date " Value Ref Range Status   12/07/2020 60 (L) >60 mL/min/1.73m2 Final     Lab Results   Component Value Date    CR 0.89 12/10/2021     No results found for: MICROALBUMIN    Healthy Eating:  Healthy Eating Assessed Today: Yes  Cultural/Restorationist diet restrictions?: No  Meal planning/habits: Smaller portions, Low carb  Meals include: Breakfast, Lunch, Dinner  Breakfast: greek yogurt and fruit - sometimes cottage cheese  Lunch: sandwich - PB? ham & cheese or chix salad  Dinner: + protein, sometimes a veg- sometimes leftovers from lunch  Snacks: homemade pickles, hard boiled eggs, raw veggies  Other: working on increasing more vegetables in diet  Beverages: Water, Coffee, Tea, Milk (crystal light / IT)  Has patient met with a dietitian in the past?: No    Being Active:  Being Active Assessed Today: Yes  Exercise:: Currently not exercising (she has been cleaning out her basement keeping her busy / active)  Barrier to exercise: None, Physical limitation (knee)    Monitoring:  Monitoring Assessed Today: Yes  Did patient bring glucose meter to appointment? : Yes (Liane reader)  Blood Glucose Meter: CGM (reports several problems with sensors - has had to replace them > 6 times)  Times checking blood sugar at home (number): 5+  Times checking blood sugar at home (per): Day  Blood glucose trend:  (stable)    Blood glucose data was reviewed and discussed with Kelley during her visit today.  Informed her that overall her glycemic control looks very good and encouraged to keep up the good work.  I did caution her about fluctuating dose amount with her basal insulin and advised her to try to stay to around 3 units daily.  We will have her continue with 27 units of Novolog with meals and okay if blood glucose is >200 mg/dL prior to eating to take 30 units.  Taking Medications:  Diabetes Medication(s)     Insulin       insulin aspart (NOVOLOG VIAL) 100 UNITS/ML vial    Inject 50 Units Subcutaneous 3 times daily (with meals)     LANTUS  VIAL 100 UNIT/ML soln    Inject 47 Units Subcutaneous At Bedtime    Incretin Mimetic Agents       semaglutide (OZEMPIC, 0.25 OR 0.5 MG/DOSE,) 2 MG/1.5ML SOPN pen    Inject 0.25 mg Subcutaneous once a week 0.25 mg weekly     semaglutide (OZEMPIC, 0.25 OR 0.5 MG/DOSE,) 2 MG/1.5ML SOPN pen    Inject 0.25 mg Subcutaneous every 7 days     Patient not taking: Reported on 1/9/2023          Taking Medication Assessed Today: Yes  Current Treatments: Insulin Injections  Dose schedule: Pre-breakfast, Pre-lunch, Pre-dinner, At bedtime  Given by: Patient  Injection/Infusion sites: Abdomen  Problems taking diabetes medications regularly?: No  Diabetes medication side effects?: No    Problem Solving:  Problem Solving Assessed Today: Yes  Is the patient at risk for hypoglycemia?: Yes  Hypoglycemia Frequency: Weekly  Hypoglycemia Treatment: Juice, Other food    Hypoglycemia symptoms  Confusion: Yes  Feeling shaky: Yes    Hypoglycemia Complications  Blackouts: No  Hospitalization: No  Nocturnal hypoglycemia: Yes  Required assistance: No  Required glucagon injection: No  Seizures: No    Reducing Risks:  Reducing Risks Assessed Today: Yes  Diabetes Risks: Age over 45 years, Sedentary Lifestyle, Hyperlipidemia  CAD Risks: Sedentary lifestyle, Post-menopausal, Diabetes Mellitus, Family history, Dyslipidemia  Feet checked by healthcare provider in the last year?: Yes    Healthy Coping:  Healthy Coping Assessed Today: Yes  Emotional response to diabetes: Ready to learn  Informal Support system:: Family, Friends  Stage of change: ACTION (Actively working towards change)  Support resources: In-person Offerings  Patient Activation Measure Survey Score:       No data to display                  Care Plan and Education Provided:  Care Plan: Diabetes   Updates made by Sandrine Cornell RN since 6/24/2023 12:00 AM      Problem: HbA1C Not In Goal    Note:    A1c 6.1 % on 6/19/23       Problem: Diabetes Self-Management Education Needed to Optimize  Self-Care Behaviors       Goal: Healthy Eating - follow a healthy eating pattern for diabetes    This Visit's Progress: 80%   Recent Progress: 70%   Note:    Reviewed importance of balance in meals. Inclusion of protein     Goal: Being Active - get regular physical activity, working up to at least 150 minutes per week    This Visit's Progress: 40%   Recent Progress: 30%   Note:    Encouraged walking       Goal: Monitoring - monitor glucose and ketones as directed    This Visit's Progress: 80%   Recent Progress: 30%      Goal: Taking Medication - patient is consistently taking medications as directed    This Visit's Progress: 90%   Recent Progress: 80%   Note:    Reviewed dosing , timing      Goal: Problem Solving - know how to prevent and manage short-term diabetes complications    This Visit's Progress: 50%   Recent Progress: 40%      Goal: Reducing Risks - know how to prevent and treat long-term diabetes complications    This Visit's Progress: 30%      Task: Provide education on major complications of diabetes, prevention, early diagnostic measures and treatment of complications Completed 6/24/2023   Responsible User: Sandrine Cornell RN      Task: Provide education on recommended care for dental, eye and foot health Completed 6/24/2023   Responsible User: Sandrine Cornell RN      Goal: Healthy Coping - use available resources to cope with the challenges of managing diabetes    This Visit's Progress: 70%   Recent Progress: On track      Task: Provide education on benefits of utilizing support systems Completed 6/24/2023   Responsible User: Sandrine Cornell RN          Thank you,  Sandrine Cornell RN [facility]  Certified Diabetes Care and   Visit type : DSMT          Time Spent: 60 minutes  Encounter Type: Individual    Any diabetes medication dose changes were made via the CDE Protocol per the patient's referring provider and primary care provider. A copy of this encounter was shared with the  provider.   Much or all of the text in this note was generated through the use of the Dragon Dictate voice-to-text software.Errors in spelling or words which seem out of context are unintentional. Sound alike errors, in particular, may have escaped editing.

## 2023-06-25 NOTE — PROGRESS NOTES
Diabetes Self-Management Education & Support    Presents for: Individual review    Type of Service: In Person Visit    Assessment Type:   ASSESSMENT:  Kelley is a very pleasant 82-year-old who returns to clinic today to review blood glucose, medications and assess/assist with her diabetes management skills with an A1c not at goal.  She arrived today unaccompanied.  He is currently monitoring her blood glucose using a personal CGM-wilber 2-with the reader.  She has reader with her today which was downloaded and reviewed to review and discuss most recent blood glucose data.  Medications were reviewed and per her report she is currently taking 30 to 50 units of Lantus SC daily, and Novolog with meals-25 to 30 units.  Additionally she told me she will inject  20 units if she has a snack. Kelley expressed frustrations that she has been having with the wilber 2 sensors.  She had call [employer] customer service line several times in regards to sensors not recognizing her meter, or informing  her 2 days after application of new sensor it had to be replaced.  Informed her that this was not an isolated incident, it was not the first patient to express the same frustrations.  Shared with her the response I had gotten from the wilber 2 rep when I discussed these occurences.  Instructed her to make sure she always tries to keep well-hydrated and to try places her in a spot will not be merissa or disturbed.  Prior to her appointment today we did have her A1c rechecked and please assay remains at 6.1%.  Overall Kelley is doing well, she is eating 3 meals daily which appear to be well-balanced and eaten in the fashion.  There was minor changes made to her current insulin doses when we reviewed her glucose data.  I will see her again in December for follow-up and recheck A1c and she was instructed call with any questions or concerns that might come up before then.  Encouraged her to work on increasing activity level.    Patient's most  recent   Lab Results   Component Value Date    A1C 6.1 06/19/2023     is meeting goal of <7.0    Diabetes knowledge and skills assessment:   Patient is knowledgeable in diabetes management concepts related to: Healthy Eating, Monitoring, Problem Solving and Healthy Coping    Continue education with the following diabetes management concepts: Healthy Eating, Being Active, Taking Medication, Problem Solving and Reducing Risks    Based on learning assessment above, most appropriate setting for further diabetes education would be: Individual setting.      PLAN  1. Eat 3 balanced meals each day - Monitor carb intake and limit to 45-60 grams per meal  This would be equal to 3-4 choices ~  1 choice = 15 grams    Do not wait longer than 4-5 hours to eat something  Snacks limit to no more than 30 grams of carbohydrates or 2 choices  Make sure you include protein source with each meal and at bedtime - this has been shown to help with blood glucose elevations    2. Check blood sugars several  times each day   Fasting and before meal target is 80 - 130   2 hours after a meal target is < 180  remember to bring meter and log book to all appointments    3. Incorporate 30 minutes activity into each day - does not need to be all at one time & walking counts    4. Take diabetes medications as prescribed Lantus 40 units every evening, 27 units of Novolog before meals - if your blood glucose is above 200 before you eat, okay to do 30 units      Topics to cover at upcoming visits: Healthy Eating, Being Active, Taking Medication, Problem Solving and Reducing Risks    Follow-up: 12/11/23    See Care Plan for co-developed, patient-state behavior change goals.  AVS provided for patient today.    Education Materials Provided:  No new materials provided today      SUBJECTIVE/OBJECTIVE:  Presents for: Individual review  Accompanied by: Self  Diabetes education in the past 24 mo: Yes ([facility] 1/9/23)  Focus of Visit: Assistance w/ making life  "changes, Self-care behavioral goal setting, Taking Medication, Healthy Eating, Being Active  Diabetes type: Type 2  Date of diagnosis: > 10 yrs  Disease course: Stable  Other concerns:: None  Cultural Influences/Ethnic Background:  Not  or       Diabetes Symptoms & Complications:  Weight trend: Stable  Complications assessed today?: No    Patient Problem List and Family Medical History reviewed for relevant medical history, current medical status, and diabetes risk factors.    Vitals:  There were no vitals taken for this visit.  Estimated body mass index is 31.44 kg/m  as calculated from the following:    Height as of 12/19/22: 1.676 m (5' 6\").    Weight as of 1/9/23: 88.4 kg (194 lb 12.8 oz).   Last 3 BP:   BP Readings from Last 3 Encounters:   12/19/22 120/62   08/31/22 104/60   12/10/21 122/64       History   Smoking Status     Never   Smokeless Tobacco     Never     Comment: pt states that 52yrs+, smoked for x 1 year only       Labs:  Lab Results   Component Value Date    A1C 6.1 06/19/2023     Lab Results   Component Value Date     12/10/2021     Lab Results   Component Value Date    LDL 54 12/07/2020    LDL 82 10/14/2019     Direct Measure HDL   Date Value Ref Range Status   12/07/2020 39 (L) >=50 mg/dL Final   ]  GFR Estimate   Date Value Ref Range Status   12/10/2021 62 >60 mL/min/1.73m2 Final     Comment:     As of July 11, 2021, eGFR is calculated by the CKD-EPI creatinine equation, without race adjustment. eGFR can be influenced by muscle mass, exercise, and diet. The reported eGFR is an estimation only and is only applicable if the renal function is stable.   12/07/2020 50 (L) >60 mL/min/1.73m2 Final     GFR Estimate If Black   Date Value Ref Range Status   12/07/2020 60 (L) >60 mL/min/1.73m2 Final     Lab Results   Component Value Date    CR 0.89 12/10/2021     No results found for: MICROALBUMIN    Healthy Eating:  Healthy Eating Assessed Today: Yes  Cultural/Jewish diet " restrictions?: No  Meal planning/habits: Smaller portions, Low carb  Meals include: Breakfast, Lunch, Dinner  Breakfast: greek yogurt and fruit - sometimes cottage cheese  Lunch: sandwich - PB? ham & cheese or chix salad  Dinner: + protein, sometimes a veg- sometimes leftovers from lunch  Snacks: homemade pickles, hard boiled eggs, raw veggies  Other: working on increasing more vegetables in diet  Beverages: Water, Coffee, Tea, Milk (crystal light / IT)  Has patient met with a dietitian in the past?: No    Being Active:  Being Active Assessed Today: Yes  Exercise:: Currently not exercising (she has been cleaning out her basement keeping her busy / active)  Barrier to exercise: None, Physical limitation (knee)    Monitoring:  Monitoring Assessed Today: Yes  Did patient bring glucose meter to appointment? : Yes (Liane reader)  Blood Glucose Meter: CGM (reports several problems with sensors - has had to replace them > 6 times)  Times checking blood sugar at home (number): 5+  Times checking blood sugar at home (per): Day  Blood glucose trend:  (stable)    Blood glucose data was reviewed and discussed with Kelley during her visit today.  Informed her that overall her glycemic control looks very good and encouraged to keep up the good work.  I did caution her about fluctuating dose amount with her basal insulin and advised her to try to stay to around 3 units daily.  We will have her continue with 27 units of Novolog with meals and okay if blood glucose is >200 mg/dL prior to eating to take 30 units.  Taking Medications:  Diabetes Medication(s)     Insulin       insulin aspart (NOVOLOG VIAL) 100 UNITS/ML vial    Inject 50 Units Subcutaneous 3 times daily (with meals)     LANTUS VIAL 100 UNIT/ML soln    Inject 47 Units Subcutaneous At Bedtime    Incretin Mimetic Agents       semaglutide (OZEMPIC, 0.25 OR 0.5 MG/DOSE,) 2 MG/1.5ML SOPN pen    Inject 0.25 mg Subcutaneous once a week 0.25 mg weekly     semaglutide (OZEMPIC,  0.25 OR 0.5 MG/DOSE,) 2 MG/1.5ML SOPN pen    Inject 0.25 mg Subcutaneous every 7 days     Patient not taking: Reported on 1/9/2023          Taking Medication Assessed Today: Yes  Current Treatments: Insulin Injections  Dose schedule: Pre-breakfast, Pre-lunch, Pre-dinner, At bedtime  Given by: Patient  Injection/Infusion sites: Abdomen  Problems taking diabetes medications regularly?: No  Diabetes medication side effects?: No    Problem Solving:  Problem Solving Assessed Today: Yes  Is the patient at risk for hypoglycemia?: Yes  Hypoglycemia Frequency: Weekly  Hypoglycemia Treatment: Juice, Other food    Hypoglycemia symptoms  Confusion: Yes  Feeling shaky: Yes    Hypoglycemia Complications  Blackouts: No  Hospitalization: No  Nocturnal hypoglycemia: Yes  Required assistance: No  Required glucagon injection: No  Seizures: No    Reducing Risks:  Reducing Risks Assessed Today: Yes  Diabetes Risks: Age over 45 years, Sedentary Lifestyle, Hyperlipidemia  CAD Risks: Sedentary lifestyle, Post-menopausal, Diabetes Mellitus, Family history, Dyslipidemia  Feet checked by healthcare provider in the last year?: Yes    Healthy Coping:  Healthy Coping Assessed Today: Yes  Emotional response to diabetes: Ready to learn  Informal Support system:: Family, Friends  Stage of change: ACTION (Actively working towards change)  Support resources: In-person Offerings  Patient Activation Measure Survey Score:       No data to display                  Care Plan and Education Provided:  Care Plan: Diabetes   Updates made by Sandrine Cornell RN since 6/24/2023 12:00 AM      Problem: HbA1C Not In Goal    Note:    A1c 6.1 % on 6/19/23       Problem: Diabetes Self-Management Education Needed to Optimize Self-Care Behaviors       Goal: Healthy Eating - follow a healthy eating pattern for diabetes    This Visit's Progress: 80%   Recent Progress: 70%   Note:    Reviewed importance of balance in meals. Inclusion of protein     Goal: Being Active -  get regular physical activity, working up to at least 150 minutes per week    This Visit's Progress: 40%   Recent Progress: 30%   Note:    Encouraged walking       Goal: Monitoring - monitor glucose and ketones as directed    This Visit's Progress: 80%   Recent Progress: 30%      Goal: Taking Medication - patient is consistently taking medications as directed    This Visit's Progress: 90%   Recent Progress: 80%   Note:    Reviewed dosing , timing      Goal: Problem Solving - know how to prevent and manage short-term diabetes complications    This Visit's Progress: 50%   Recent Progress: 40%      Goal: Reducing Risks - know how to prevent and treat long-term diabetes complications    This Visit's Progress: 30%      Task: Provide education on major complications of diabetes, prevention, early diagnostic measures and treatment of complications Completed 6/24/2023   Responsible User: Sandrine Cornell RN      Task: Provide education on recommended care for dental, eye and foot health Completed 6/24/2023   Responsible User: Sandrine Cornell RN      Goal: Healthy Coping - use available resources to cope with the challenges of managing diabetes    This Visit's Progress: 70%   Recent Progress: On track      Task: Provide education on benefits of utilizing support systems Completed 6/24/2023   Responsible User: Sandrine Cornell RN          Thank you,  Sandrine Cornell RN [facility]  Certified Diabetes Care and   Visit type : DSMT          Time Spent: 60 minutes  Encounter Type: Individual    Any diabetes medication dose changes were made via the CDE Protocol per the patient's referring provider and primary care provider. A copy of this encounter was shared with the provider.   Much or all of the text in this note was generated through the use of the Dragon Dictate voice-to-text software.Errors in spelling or words which seem out of context are unintentional. Sound alike errors, in particular, may have escaped  editing.

## 2023-06-26 ENCOUNTER — TRANSFERRED RECORDS (OUTPATIENT)
Dept: HEALTH INFORMATION MANAGEMENT | Facility: CLINIC | Age: 83
End: 2023-06-26
Payer: COMMERCIAL

## 2023-06-26 LAB — RETINOPATHY: NEGATIVE

## 2023-07-20 ENCOUNTER — OFFICE VISIT (OUTPATIENT)
Dept: INTERNAL MEDICINE | Facility: CLINIC | Age: 83
End: 2023-07-20
Payer: COMMERCIAL

## 2023-07-20 VITALS
OXYGEN SATURATION: 97 % | HEART RATE: 68 BPM | WEIGHT: 206 LBS | TEMPERATURE: 98.3 F | DIASTOLIC BLOOD PRESSURE: 65 MMHG | SYSTOLIC BLOOD PRESSURE: 138 MMHG | BODY MASS INDEX: 33.11 KG/M2 | RESPIRATION RATE: 16 BRPM | HEIGHT: 66 IN

## 2023-07-20 DIAGNOSIS — Z12.11 ENCOUNTER FOR SCREENING COLONOSCOPY: ICD-10-CM

## 2023-07-20 DIAGNOSIS — M89.9 DISORDER OF BONE AND CARTILAGE: ICD-10-CM

## 2023-07-20 DIAGNOSIS — Z78.0 MENOPAUSE: ICD-10-CM

## 2023-07-20 DIAGNOSIS — Z86.0100 HISTORY OF COLONIC POLYPS: ICD-10-CM

## 2023-07-20 DIAGNOSIS — E11.9 TYPE 2 DIABETES, HBA1C GOAL < 8% (H): ICD-10-CM

## 2023-07-20 DIAGNOSIS — E78.5 HYPERLIPIDEMIA LDL GOAL <100: Primary | ICD-10-CM

## 2023-07-20 DIAGNOSIS — M94.9 DISORDER OF BONE AND CARTILAGE: ICD-10-CM

## 2023-07-20 DIAGNOSIS — E55.9 VITAMIN D DEFICIENCY: ICD-10-CM

## 2023-07-20 DIAGNOSIS — M17.0 PRIMARY OSTEOARTHRITIS OF BOTH KNEES: ICD-10-CM

## 2023-07-20 DIAGNOSIS — I10 ESSENTIAL HYPERTENSION: ICD-10-CM

## 2023-07-20 DIAGNOSIS — Z23 NEED FOR SHINGLES VACCINE: ICD-10-CM

## 2023-07-20 LAB
ERYTHROCYTE [DISTWIDTH] IN BLOOD BY AUTOMATED COUNT: 12.6 % (ref 10–15)
HBA1C MFR BLD: 6.3 % (ref 0–5.6)
HCT VFR BLD AUTO: 42 % (ref 35–47)
HGB BLD-MCNC: 13.5 G/DL (ref 11.7–15.7)
MCH RBC QN AUTO: 30.1 PG (ref 26.5–33)
MCHC RBC AUTO-ENTMCNC: 32.1 G/DL (ref 31.5–36.5)
MCV RBC AUTO: 94 FL (ref 78–100)
PLATELET # BLD AUTO: 245 10E3/UL (ref 150–450)
RBC # BLD AUTO: 4.49 10E6/UL (ref 3.8–5.2)
WBC # BLD AUTO: 8.4 10E3/UL (ref 4–11)

## 2023-07-20 PROCEDURE — 80053 COMPREHEN METABOLIC PANEL: CPT | Performed by: INTERNAL MEDICINE

## 2023-07-20 PROCEDURE — 82570 ASSAY OF URINE CREATININE: CPT | Performed by: INTERNAL MEDICINE

## 2023-07-20 PROCEDURE — 85027 COMPLETE CBC AUTOMATED: CPT | Performed by: INTERNAL MEDICINE

## 2023-07-20 PROCEDURE — 82043 UR ALBUMIN QUANTITATIVE: CPT | Performed by: INTERNAL MEDICINE

## 2023-07-20 PROCEDURE — 36415 COLL VENOUS BLD VENIPUNCTURE: CPT | Performed by: INTERNAL MEDICINE

## 2023-07-20 PROCEDURE — 99214 OFFICE O/P EST MOD 30 MIN: CPT | Mod: 25 | Performed by: INTERNAL MEDICINE

## 2023-07-20 PROCEDURE — G0439 PPPS, SUBSEQ VISIT: HCPCS | Performed by: INTERNAL MEDICINE

## 2023-07-20 PROCEDURE — 83036 HEMOGLOBIN GLYCOSYLATED A1C: CPT | Performed by: INTERNAL MEDICINE

## 2023-07-20 ASSESSMENT — ENCOUNTER SYMPTOMS
JOINT SWELLING: 1
HEARTBURN: 0
SHORTNESS OF BREATH: 0
NERVOUS/ANXIOUS: 0
HEADACHES: 0
ARTHRALGIAS: 1
ABDOMINAL PAIN: 0
HEMATURIA: 0
DIZZINESS: 0
FREQUENCY: 0
NAUSEA: 0
HEMATOCHEZIA: 0
CONSTIPATION: 0
SORE THROAT: 0
EYE PAIN: 0
COUGH: 0
DIARRHEA: 0
CHILLS: 0
PARESTHESIAS: 0
PALPITATIONS: 0
FEVER: 0
BREAST MASS: 0
DYSURIA: 0
MYALGIAS: 0
WEAKNESS: 0

## 2023-07-20 ASSESSMENT — ACTIVITIES OF DAILY LIVING (ADL): CURRENT_FUNCTION: TRANSPORTATION REQUIRES ASSISTANCE

## 2023-07-20 NOTE — LETTER
July 29, 2023      Kelley Styles  [address] [facility] [address]        Dear ,    We are writing to inform you of your test results.    Your tests are all good.  The kidney function is good, the liver tests are normal, the blood counts are normal, the diabetes is good, the minor abnormalities are not significant.     Resulted Orders   Albumin Random Urine Quantitative with Creat Ratio   Result Value Ref Range    Creatinine Urine mg/dL 28.1 mg/dL      Comment:      The reference ranges have not been established in urine creatinine. The results should be integrated into the clinical context for interpretation.    Albumin Urine mg/L <12.0 mg/L      Comment:      The reference ranges have not been established in urine albumin. The results should be integrated into the clinical context for interpretation.    Albumin Urine mg/g Cr        Comment:      Unable to calculate, urine albumin and/or urine creatinine is outside detectable limits.  Microalbuminuria is defined as an albumin:creatinine ratio of 17 to 299 for males and 25 to 299 for females. A ratio of albumin:creatinine of 300 or higher is indicative of overt proteinuria.  Due to biologic variability, positive results should be confirmed by a second, first-morning random or 24-hour timed urine specimen. If there is discrepancy, a third specimen is recommended. When 2 out of 3 results are in the microalbuminuria range, this is evidence for incipient nephropathy and warrants increased efforts at glucose control, blood pressure control, and institution of therapy with an angiotensin-converting-enzyme (ACE) inhibitor (if the patient can tolerate it).     CBC with platelets   Result Value Ref Range    WBC Count 8.4 4.0 - 11.0 10e3/uL    RBC Count 4.49 3.80 - 5.20 10e6/uL    Hemoglobin 13.5 11.7 - 15.7 g/dL    Hematocrit 42.0 35.0 - 47.0 %    MCV 94 78 - 100 fL    MCH 30.1 26.5 - 33.0 pg    MCHC 32.1 31.5 - 36.5 g/dL    RDW 12.6 10.0 - 15.0 %     Platelet Count 245 150 - 450 10e3/uL   Hemoglobin A1c   Result Value Ref Range    Hemoglobin A1C 6.3 (H) 0.0 - 5.6 %      Comment:      Normal <5.7%   Prediabetes 5.7-6.4%    Diabetes 6.5% or higher     Note: Adopted from ADA consensus guidelines.   Comprehensive metabolic panel   Result Value Ref Range    Sodium 142 136 - 145 mmol/L    Potassium 4.5 3.4 - 5.3 mmol/L    Chloride 103 98 - 107 mmol/L    Carbon Dioxide (CO2) 25 22 - 29 mmol/L    Anion Gap 14 7 - 15 mmol/L    Urea Nitrogen 24.8 (H) 8.0 - 23.0 mg/dL    Creatinine 0.82 0.51 - 0.95 mg/dL    Calcium 9.3 8.8 - 10.2 mg/dL    Glucose 143 (H) 70 - 99 mg/dL    Alkaline Phosphatase 107 (H) 35 - 104 U/L    AST 23 0 - 45 U/L      Comment:      Reference intervals for this test were updated on 6/12/2023 to more accurately reflect our healthy population. There may be differences in the flagging of prior results with similar values performed with this method. Interpretation of those prior results can be made in the context of the updated reference intervals.    ALT 22 0 - 50 U/L      Comment:      Reference intervals for this test were updated on 6/12/2023 to more accurately reflect our healthy population. There may be differences in the flagging of prior results with similar values performed with this method. Interpretation of those prior results can be made in the context of the updated reference intervals.      Protein Total 7.2 6.4 - 8.3 g/dL    Albumin 4.3 3.5 - 5.2 g/dL    Bilirubin Total 0.2 <=1.2 mg/dL    GFR Estimate 72 >60 mL/min/1.73m2       If you have any questions or concerns, please call the clinic at the number listed above.       Sincerely,      Ketan Narayan MD

## 2023-07-20 NOTE — PROGRESS NOTES
"SUBJECTIVE:   Kelley is a 82 year old who presents for Preventive Visit.    HPI:  Feeling OK. Using insulin, not ozempic - she got constipation with ozempic.  Ongoing macular degeneration therapy.  Gradual vision loss.  Knee pain particularly on the left. Known DJD of knees.  Uses walker with wheels and seat at home.  Has had shingrix, and boosters for Covid. She has sense of fullness in ears.  No vertigo.  She stopped the ozempic and has regained 40 pounds.            7/20/2023     1:48 PM   Additional Questions   Roomed by Neyda RODRIGEZ CMA     Are you in the first 12 months of your Medicare coverage?  No    Healthy Habits:     In general, how would you rate your overall health?  Fair    Frequency of exercise:  None    Do you usually eat at least 4 servings of fruit and vegetables a day, include whole grains    & fiber and avoid regularly eating high fat or \"junk\" foods?  No    Taking medications regularly:  Yes    Medication side effects:  Not applicable    Ability to successfully perform activities of daily living:  Transportation requires assistance    Home Safety:  No safety concerns identified    Hearing Impairment:  Difficulty following dialogue in the theater    In the past 6 months, have you been bothered by leaking of urine? Yes    In general, how would you rate your overall mental or emotional health?  Good    Additional concerns today:  Yes    Have you ever done Advance Care Planning? (For example, a Health Directive, POLST, or a discussion with a medical provider or your loved ones about your wishes): No, advance care planning information given to patient to review.  Patient plans to discuss their wishes with loved ones or provider.    Hearing - OK   Fall risk  Fallen 2 or more times in the past year?: No  Any fall with injury in the past year?: No  click delete button to remove this line now  Cognitive Screening   1) Repeat 3 items (Leader, Season, Table)    2) Clock draw: NORMAL  3) 3 item recall: Recalls " 3 objects  Results: Normal     Mini-CogTM Copyright JACK Cardoso. Licensed by the author for use in [facility]; reprinted with permission ([email or web address]@.[email or web address]). All rights reserved.      Reviewed and updated as needed this visit by clinical staff   Tobacco  Allergies  Meds            Reviewed and updated as needed this visit by Provider     Social History     Tobacco Use     Smoking status: Never     Smokeless tobacco: Never     Tobacco comments:     pt states that 50yrs+, smoked for x 1 year only   Substance Use Topics     Alcohol use: No     Comment: Alcoholic Drinks/day: rare wine out for lunch         7/20/2023     1:06 PM   Alcohol Use   Prescreen: >3 drinks/day or >7 drinks/week? No     Do you have a current opioid prescription? No  Do you use any other controlled substances or medications that are not prescribed by a provider? None    Current providers sharing in care for this patient include:   Patient Care Team:  Ketan Narayan MD as PCP - General (Internal Medicine)  Crystal Ross, PharmD as Pharmacist (Pharmacist)  Sandrine Cornell, RN as Registered Nurse (Diabetes Education)  Ketan Zepeda    The following health maintenance items are reviewed in Epic and correct as of today:  Health Maintenance   Topic Date Due     DEXA  Never done     DIABETIC FOOT EXAM  Never done     ANNUAL REVIEW OF HM ORDERS  Never done     ZOSTER IMMUNIZATION (1 of 2) Never done     MICROALBUMIN  03/11/2021     LIPID  12/07/2021     MEDICARE ANNUAL WELLNESS VISIT  01/04/2022     BMP  12/10/2022     INFLUENZA VACCINE (1) 09/01/2023     A1C  12/19/2023     MAMMO SCREENING  05/24/2024     EYE EXAM  06/26/2024     FALL RISK ASSESSMENT  07/20/2024     DTAP/TDAP/TD IMMUNIZATION (2 - Td or Tdap) 07/20/2025     COLORECTAL CANCER SCREENING  09/02/2025     ADVANCE CARE PLANNING  01/04/2026     PHQ-2 (once per calendar year)  Completed     Pneumococcal Vaccine: 65+ Years  Completed     COVID-19 Vaccine  Completed     IPV IMMUNIZATION  " Aged Out     MENINGITIS IMMUNIZATION  Aged Out     Pertinent mammograms are reviewed under the imaging tab.    Review of Systems   Constitutional: Negative for chills and fever.   HENT: Negative for congestion, ear pain, hearing loss and sore throat.    Eyes: Positive for visual disturbance. Negative for pain.   Respiratory: Negative for cough and shortness of breath.    Cardiovascular: Negative for chest pain, palpitations and peripheral edema.   Gastrointestinal: Negative for abdominal pain, constipation, diarrhea, heartburn, hematochezia and nausea.   Breasts:  Negative for tenderness, breast mass and discharge.   Genitourinary: Negative for dysuria, frequency, genital sores, hematuria, pelvic pain, urgency, vaginal bleeding and vaginal discharge.   Musculoskeletal: Positive for arthralgias and joint swelling. Negative for myalgias.   Skin: Negative for rash.   Neurological: Negative for dizziness, weakness, headaches and paresthesias.   Psychiatric/Behavioral: Negative for mood changes. The patient is not nervous/anxious.      OBJECTIVE:     PHYSICAL EXAM:  General Appearance: In no acute distress  /65 (BP Location: Right arm, Patient Position: Sitting, Cuff Size: Adult Large)   Pulse 68   Temp 98.3  F (36.8  C) (Tympanic)   Resp 16   Ht 1.676 m (5' 6\")   Wt 93.4 kg (206 lb)   LMP  (LMP Unknown)   SpO2 97%   Breastfeeding No   BMI 33.25 kg/m    EYES: Clear   HEENT: nose and throat clear, ears have bilateral cerumen impaction R > L   NECK:  without cervical or axillary adenopathy  RESPIRATORY: Clear   CARDIOVASCULAR: S1, S2  ABDOMEN: soft, flat, and non-tender  EXTREMITIES: knees are bilaterally dystrophic, no effusions  NEUROLOGIC: speech is clear, gait is normal    ASSESSMENT / PLAN:     Kelley was seen today for wellness visit and recheck medication.    Diagnoses and all orders for this visit:    Hyperlipidemia LDL goal <100  Ongoing statin therapy.     Vitamin D deficiency  Ongoing replacement. " "    Type 2 diabetes, HbA1C goal < 8%  A1c today.  Has been OK off ozempic.       Essential hypertension  Satisfactory control.      History of colonic polyps  Referral for colonoscopy    Primary osteoarthritis of both knees  Reviewed left knee xray from the past.  Very significant DJD. She will be seeing orthopedic surgery.     Osteopenia  She agrees to DXA    Cerumen impaction  Bilateral - she declines disimpaction now.     COUNSELING:  Reviewed preventive health counseling, as reflected in patient instructions       Regular exercise       Healthy diet/nutrition       Osteoporosis prevention/bone health       Colon cancer screening    BMI:   Estimated body mass index is 31.44 kg/m  as calculated from the following:    Height as of 12/19/22: 1.676 m (5' 6\").    Weight as of 1/9/23: 88.4 kg (194 lb 12.8 oz).   Weight management plan: Discussed healthy diet and exercise guidelines  She may go back on ozempic.     She reports that she has never smoked. She has never used smokeless tobacco.    Appropriate preventive services were discussed with this patient, including applicable screening as appropriate for cardiovascular disease, diabetes, osteopenia/osteoporosis, and glaucoma.  As appropriate for age/gender, discussed screening for colorectal cancer, prostate cancer, breast cancer, and cervical cancer. Checklist reviewing preventive services available has been given to the patient.    Reviewed patients plan of care and provided an AVS. The Basic Care Plan (routine screening as documented in Health Maintenance) for Kelley meets the Care Plan requirement. This Care Plan has been established and reviewed with the Patient.    Ketan Narayan MD  [facility]    Identified Health Risks:    I have reviewed Opioid Use Disorder and Substance Use Disorder risk factors and made any needed referrals.    Hyperlipidemia    Type 2 DM    Macular degeneration     osteopenia    "

## 2023-07-21 LAB
ALBUMIN SERPL BCG-MCNC: 4.3 G/DL (ref 3.5–5.2)
ALP SERPL-CCNC: 107 U/L (ref 35–104)
ALT SERPL W P-5'-P-CCNC: 22 U/L (ref 0–50)
ANION GAP SERPL CALCULATED.3IONS-SCNC: 14 MMOL/L (ref 7–15)
AST SERPL W P-5'-P-CCNC: 23 U/L (ref 0–45)
BILIRUB SERPL-MCNC: 0.2 MG/DL
BUN SERPL-MCNC: 24.8 MG/DL (ref 8–23)
CALCIUM SERPL-MCNC: 9.3 MG/DL (ref 8.8–10.2)
CHLORIDE SERPL-SCNC: 103 MMOL/L (ref 98–107)
CREAT SERPL-MCNC: 0.82 MG/DL (ref 0.51–0.95)
CREAT UR-MCNC: 28.1 MG/DL
DEPRECATED HCO3 PLAS-SCNC: 25 MMOL/L (ref 22–29)
GFR SERPL CREATININE-BSD FRML MDRD: 72 ML/MIN/1.73M2
GLUCOSE SERPL-MCNC: 143 MG/DL (ref 70–99)
MICROALBUMIN UR-MCNC: <12 MG/L
MICROALBUMIN/CREAT UR: NORMAL MG/G{CREAT}
POTASSIUM SERPL-SCNC: 4.5 MMOL/L (ref 3.4–5.3)
PROT SERPL-MCNC: 7.2 G/DL (ref 6.4–8.3)
SODIUM SERPL-SCNC: 142 MMOL/L (ref 136–145)

## 2023-07-27 ENCOUNTER — HOSPITAL ENCOUNTER (OUTPATIENT)
Facility: AMBULATORY SURGERY CENTER | Age: 83
End: 2023-07-27
Attending: SURGERY | Admitting: SURGERY
Payer: COMMERCIAL

## 2023-07-27 ENCOUNTER — TELEPHONE (OUTPATIENT)
Dept: GASTROENTEROLOGY | Facility: CLINIC | Age: 83
End: 2023-07-27
Payer: COMMERCIAL

## 2023-07-27 NOTE — TELEPHONE ENCOUNTER
"Endoscopy Scheduling Screen    Have you had a positive Covid test in the last 14 days?  No    Are you active on MyChart?   Yes    What insurance is in the chart?  Other:  [facility]/MEDICARE     Ordering/Referring Provider: HECTOR MENDOZA    (If ordering provider performs procedure, schedule with ordering provider unless otherwise instructed. )    BMI: Estimated body mass index is 33.25 kg/m  as calculated from the following:    Height as of 7/20/23: 1.676 m (5' 6\").    Weight as of 7/20/23: 93.4 kg (206 lb).     Sedation Ordered  moderate sedation.   If patient BMI > 50 do not schedule in ASC.    Are you taking any prescription medications for pain?   No    Are you taking methadone or Suboxone?  No    Do you have a history of malignant hyperthermia or adverse reaction to anesthesia?  No    (Females) Are you currently pregnant?   No     Have you been diagnosed or told you have pulmonary hypertension?   No    Do you have an LVAD?  No    Have you been told you have moderate to severe sleep apnea?  No    Have you been told you have COPD, asthma, or any other lung disease?  No    Do you have any heart conditions?  No     Have you ever had or are you awaiting a heart or lung transplant?   No    Have you had a stroke or transient ischemic attack (TIA aka \"mini stroke\" in the last 6 months?   No    Have you been diagnosed with or been told you have cirrhosis of the liver?   No    Are you currently on dialysis?   No    Do you need assistance transferring?   No    BMI: Estimated body mass index is 33.25 kg/m  as calculated from the following:    Height as of 7/20/23: 1.676 m (5' 6\").    Weight as of 7/20/23: 93.4 kg (206 lb).     Is patients BMI > 40 and scheduling location [facility]?  No    Do you take the medication Phentermine, Ozempic or Wegovy?  No    Do you take the medication Naltrexone?  No    Do you take blood thinners?  No      Prep   Are you currently on dialysis or do you have chronic kidney disease?  No    Do you have a " diagnosis of diabetes?  Yes (Golytely Prep)    Do you have a diagnosis of cystic fibrosis (CF)?  No    On a regular basis do you go 3 -5 days between bowel movements?  No    BMI > 40?  No    Preferred Pharmacy:    [facility]/pharmacy #[id number] - [place], [place] - [address] [facility] [address] [facility]  [address] [place] [address]  Phone: [phone] Fax: [phone]      Final Scheduling Details   Colonoscopy prep sent?  Standard Golytely    Procedure scheduled  Colonoscopy    Surgeon:  ABIGAIL      Date of procedure:  08/18/2023     Schedule PAC:   No    Location  [facility] - [facility]    Sedation   Moderate Sedation    Patient Reminders:   You will receive a call from a Nurse to review instructions and health history.  This assessment must be completed prior to your procedure.  Failure to complete the Nurse assessment may result in the procedure being cancelled.      On the day of your procedure, please designate an adult(s) who can drive you home stay with you for the next 24 hours. The medicines used in the exam will make you sleepy. You will not be able to drive.      You cannot take public transportation, ride share services, or non-medical taxi service without a responsible caregiver.  Medical transport services are allowed with the requirement that a responsible caregiver will receive you at your destination.  We require that drivers and caregivers are confirmed prior to your procedure.

## 2023-08-03 ENCOUNTER — TELEPHONE (OUTPATIENT)
Dept: GASTROENTEROLOGY | Facility: CLINIC | Age: 83
End: 2023-08-03
Payer: COMMERCIAL

## 2023-08-03 DIAGNOSIS — Z86.0100 HISTORY OF COLONIC POLYPS: Primary | ICD-10-CM

## 2023-08-03 RX ORDER — BISACODYL 5 MG/1
TABLET, DELAYED RELEASE ORAL
Qty: 4 TABLET | Refills: 0 | Status: SHIPPED | OUTPATIENT
Start: 2023-08-03

## 2023-08-03 NOTE — TELEPHONE ENCOUNTER
Pre assessment completed for upcoming procedure.   (Please see previous telephone encounter notes for complete details)       Procedure details:    Arrival time and facility location reviewed    Pre op exam needed? N/A    Designated  policy reviewed. Instructed to have someone stay 6 hours post procedure.     COVID policy reviewed.      Medication review:    Medications reviewed. Please see supporting documentation below. Holding recommendations discussed (if applicable).       Prep for procedure:     Reviewed procedure prep instructions.       Additional information needed?  N/A      Patient  verbalized understanding and had no questions or concerns at this time.      Anu Sparrow RN  Endoscopy Procedure Pre Assessment RN  [phone] option 4

## 2023-08-03 NOTE — TELEPHONE ENCOUNTER
Pre visit planning completed.      Procedure details:    Patient scheduled for Colonoscopy  on 8/18/2023.     Arrival time: 1015. Procedure time 1115    Pre op exam needed? N/A    Facility location: [facility]; [address] [place] [address], [address] Floor, [place] [address]    Sedation type: Conscious sedation     Indication for procedure: History of colonic polyps       Chart review:     Electronic implanted devices? No    Diabetic? Yes. See medication holding recommendations     Diabetic medication HOLDING recommendations: (if applicable)  Oral diabetic medications: N/A  Diabetic injectables: N/A  Insulin: Yes. Consult with managing provider       Medication review:    Anticoagulants? No    NSAIDS? Yes.  Ibuprofen (Advil, Motrin).  Holding interval of 1 day before procedure.    Other medication HOLDING recommendations:  N/A      Prep for procedure:     Bowel prep recommendation: Standard Golytely    Due to:  diabetes.     Prep instructions sent via letter, [employer]     Bowel prep script sent to    [facility]/PHARMACY #[id number] - [place], [place] - [address] Sparrow RN  Endoscopy Procedure Pre Assessment RN  [phone] option 4

## 2023-08-07 ENCOUNTER — TELEPHONE (OUTPATIENT)
Dept: GASTROENTEROLOGY | Facility: CLINIC | Age: 83
End: 2023-08-07
Payer: COMMERCIAL

## 2023-08-07 ENCOUNTER — TELEPHONE (OUTPATIENT)
Dept: INTERNAL MEDICINE | Facility: CLINIC | Age: 83
End: 2023-08-07
Payer: COMMERCIAL

## 2023-08-07 DIAGNOSIS — Z86.0100 HISTORY OF COLONIC POLYPS: Primary | ICD-10-CM

## 2023-08-07 NOTE — TELEPHONE ENCOUNTER
Reason for Reschedule/Cancellation (please be detailed, any staff messages or encounters to note?): Per note below pt called in to cancel procedure and will call back at a different time to reschedule       Prior to reschedule please review:  Ordering Provider:     Ketan Narayan MD in [facility] INTERNAL MEDICINE     Sedation per order: moderate  Does patient have any ASC Exclusions, please identify?: n      Notes on Cancelled Procedure:  Procedure: Lower Endoscopy [Colonoscopy]   Date: 08/18/2023  [facility]; [address] [place] [address], [address] Floor, [place] [address]  Surgeon: Ebonie      Rescheduled: No

## 2023-08-07 NOTE — TELEPHONE ENCOUNTER
----- Message from Estelita Shukla RN sent at 8/7/2023  4:08 PM CDT -----  Regarding: Cancel  Patient called to cancel appointment for 08.18.2023 at the [facility].  She will call at a later time to reschedule.      Thank you,   Linda Shukla RN

## 2023-08-07 NOTE — TELEPHONE ENCOUNTER
Order/Referral Request    Who is requesting: Patient     Orders being requested: Cologuard    Reason service is needed/diagnosis: In place of the colonoscopy - Patient called and said that she has an appointment scheduled for the colonoscopy in about 10 days, however the patient cannot find a friend or family member that can stay with her for the recommended 24 hours afterwards.    Patient called her insurance [employer], and says that they would authorize/approve her for the cologuard even though she is not a high risk    When are orders needed by: ASAP    Has this been discussed with Provider: No    Does patient have a preference on a Group/Provider/Facility? N/a    Does patient have an appointment scheduled?: No    Where to send orders: Place orders within Epic    prefer to receive a phone call?:   Patient would prefer a phone call     Okay to leave a detailed message?: Yes at Home number on file [phone] (home)  
Patient's belongings returned

## 2023-08-08 ENCOUNTER — LAB (OUTPATIENT)
Dept: INTERNAL MEDICINE | Facility: CLINIC | Age: 83
End: 2023-08-08
Payer: COMMERCIAL

## 2023-08-08 DIAGNOSIS — E11.9 TYPE 2 DIABETES, HBA1C GOAL < 8% (H): ICD-10-CM

## 2023-08-08 DIAGNOSIS — Z86.0100 HISTORY OF COLONIC POLYPS: ICD-10-CM

## 2023-08-08 RX ORDER — INSULIN GLARGINE 100 [IU]/ML
47 INJECTION, SOLUTION SUBCUTANEOUS AT BEDTIME
Qty: 90 ML | Refills: 3 | Status: SHIPPED | OUTPATIENT
Start: 2023-08-08

## 2023-08-08 NOTE — TELEPHONE ENCOUNTER
"Last Written Prescription Date:  4/18/23  Last Fill Quantity: 90 mL,  # refills: 3   Last office visit provider:  7/20/23     Requested Prescriptions   Pending Prescriptions Disp Refills    LANTUS VIAL 100 UNIT/ML soln 90 mL 3     Sig: Inject 47 Units Subcutaneous At Bedtime       Long Acting Insulin Protocol Passed - 8/8/2023  2:44 PM        Passed - Serum creatinine on file in past 12 months     Recent Labs   Lab Test 07/20/23  1449   CR 0.82       Ok to refill medication if creatinine is low          Passed - HgbA1C in past 3 or 6 months     If HgbA1C is 8 or greater, it needs to be on file within the past 3 months.  If less than 8, must be on file within the past 6 months.     Recent Labs   Lab Test 07/20/23  1449   A1C 6.3*             Passed - Medication is active on med list        Passed - Patient is age 18 or older        Passed - Recent (6 mo) or future (30 days) visit within the authorizing provider's specialty     Patient had office visit in the last 6 months or has a visit in the next 30 days with authorizing provider or within the authorizing provider's specialty.  See \"Patient Info\" tab in inbasket, or \"Choose Columns\" in Meds & Orders section of the refill encounter.                 Beth Ramirez 08/08/23 3:46 PM  "

## 2023-08-17 ENCOUNTER — TELEPHONE (OUTPATIENT)
Dept: INTERNAL MEDICINE | Facility: CLINIC | Age: 83
End: 2023-08-17
Payer: COMMERCIAL

## 2023-08-17 DIAGNOSIS — Z12.11 SCREENING FOR COLORECTAL CANCER: Primary | ICD-10-CM

## 2023-08-17 DIAGNOSIS — Z12.12 SCREENING FOR COLORECTAL CANCER: Primary | ICD-10-CM

## 2023-08-17 NOTE — TELEPHONE ENCOUNTER
General Call    Contacts         Type Contact Phone/Fax    08/17/2023 11:17 AM CDT Phone (Incoming) Kelley Styles (Self) [phone] ()          Reason for Call:  Cologualatanya    What are your questions or concerns:  patient called Cologuard and it is not linked to preventative z12-.11 and z12.12 and they will not send out her kit.    Please call [phone] -billing option.    Date of last appointment with provider: na    Could we send this information to you in [employer] or would you prefer to receive a phone call?:   Patient would prefer a phone call   Okay to leave a detailed message?: Yes at Home number on file [phone] (home)

## 2023-08-21 NOTE — TELEPHONE ENCOUNTER
New Cologuard order pended with request Dx code below. Please sign order and send message back to support pool to call patient.

## 2023-08-22 ENCOUNTER — LAB (OUTPATIENT)
Dept: INTERNAL MEDICINE | Facility: CLINIC | Age: 83
End: 2023-08-22
Payer: COMMERCIAL

## 2023-08-22 DIAGNOSIS — Z12.11 SCREENING FOR COLORECTAL CANCER: ICD-10-CM

## 2023-08-22 DIAGNOSIS — Z12.12 SCREENING FOR COLORECTAL CANCER: ICD-10-CM

## 2023-09-08 DIAGNOSIS — I10 ESSENTIAL HYPERTENSION: ICD-10-CM

## 2023-09-08 RX ORDER — LISINOPRIL/HYDROCHLOROTHIAZIDE 10-12.5 MG
TABLET ORAL
Qty: 90 TABLET | Refills: 3 | Status: SHIPPED | OUTPATIENT
Start: 2023-09-08 | End: 2024-08-21

## 2023-09-08 NOTE — TELEPHONE ENCOUNTER
Patient called and she was not aware of the medication being discontinued when she met with Dr. Narayan and feels that she should still be on this medication.    Patient is requesting a 90 days supply, and would like a call for confirmation

## 2023-09-08 NOTE — TELEPHONE ENCOUNTER
"Last Written Prescription Date:  2/21/2022  Last Fill Quantity: 90,  # refills: 3   Last office visit provider:  7/20/2023     Requested Prescriptions   Pending Prescriptions Disp Refills    lisinopril-hydrochlorothiazide (ZESTORETIC) 10-12.5 MG tablet 90 tablet 3     Sig: TAKE 1 TABLET BEFORE BREAKFAST       Diuretics (Including Combos) Protocol Passed - 9/8/2023 10:02 AM        Passed - Blood pressure under 140/90 in past 12 months     BP Readings from Last 3 Encounters:   07/20/23 138/65   12/19/22 120/62   08/31/22 104/60                 Passed - Recent (12 mo) or future (30 days) visit within the authorizing provider's specialty     Patient has had an office visit with the authorizing provider or a provider within the authorizing providers department within the previous 12 mos or has a future within next 30 days. See \"Patient Info\" tab in inbasket, or \"Choose Columns\" in Meds & Orders section of the refill encounter.              Passed - Medication is active on med list        Passed - Patient is age 18 or older        Passed - No active pregancy on record        Passed - Normal serum creatinine on file in past 12 months     Recent Labs   Lab Test 07/20/23  1449   CR 0.82              Passed - Normal serum potassium on file in past 12 months     Recent Labs   Lab Test 07/20/23  1449   POTASSIUM 4.5                    Passed - Normal serum sodium on file in past 12 months     Recent Labs   Lab Test 07/20/23  1449                 Passed - No positive pregnancy test in past 12 months       ACE Inhibitors (Including Combos) Protocol Passed - 9/8/2023 10:02 AM        Passed - Blood pressure under 140/90 in past 12 months     BP Readings from Last 3 Encounters:   07/20/23 138/65   12/19/22 120/62   08/31/22 104/60                 Passed - Recent (12 mo) or future (30 days) visit within the authorizing provider's specialty     Patient has had an office visit with the authorizing provider or a provider within " "the authorizing providers department within the previous 12 mos or has a future within next 30 days. See \"Patient Info\" tab in inbasket, or \"Choose Columns\" in Meds & Orders section of the refill encounter.              Passed - Medication is active on med list        Passed - Patient is age 18 or older        Passed - No active pregnancy on record        Passed - Normal serum creatinine on file in past 12 months     Recent Labs   Lab Test 07/20/23  1449   CR 0.82       Ok to refill medication if creatinine is low          Passed - Normal serum potassium on file in past 12 months     Recent Labs   Lab Test 07/20/23  1449   POTASSIUM 4.5             Passed - No positive pregnancy test within past 12 months             Kristofer Rocha RN 09/08/23 12:54 PM  "

## 2023-09-17 LAB — NONINV COLON CA DNA+OCC BLD SCRN STL QL: NEGATIVE

## 2023-10-18 ENCOUNTER — TRANSFERRED RECORDS (OUTPATIENT)
Dept: HEALTH INFORMATION MANAGEMENT | Facility: CLINIC | Age: 83
End: 2023-10-18
Payer: COMMERCIAL

## 2023-10-26 ENCOUNTER — TELEPHONE (OUTPATIENT)
Dept: INTERNAL MEDICINE | Facility: CLINIC | Age: 83
End: 2023-10-26
Payer: COMMERCIAL

## 2023-10-26 NOTE — TELEPHONE ENCOUNTER
General Call    Contacts         Type Contact Phone/Fax    10/26/2023 03:26 PM CDT Phone (Incoming) Kelley Styles (Self) [phone] (H)          Reason for Call:  pt seen dermatology consults     What are your questions or concerns:  She [facility]cal[facility] and he prescribed minoxidile 2.5 % pill and she cuts it in half and once per day.    She is wondering what Dr Morales thinks of her taking this medication and if in the future he would prescribe for her.  If ok  [facility] for 90 day script.  She only got a 2 weeks worth with Dr Alcala.    Date of last appointment with provider: 7/20/23    Could we send this information to you in [employer] or would you prefer to receive a phone call?:   Patient would prefer a phone call   Okay to leave a detailed message?: No at Home number on file [phone] (home)

## 2023-10-27 DIAGNOSIS — L65.9 ALOPECIA: Primary | ICD-10-CM

## 2023-10-27 RX ORDER — MINOXIDIL 2.5 MG/1
2.5 TABLET ORAL DAILY
Qty: 45 TABLET | Refills: 3 | Status: SHIPPED | OUTPATIENT
Start: 2023-10-27

## 2023-10-30 NOTE — TELEPHONE ENCOUNTER
Relayed provider recommendation to patient. Patient verbalized understanding.  No further questions at this time. Patient denies any side effects from the medication so far.

## 2023-11-01 DIAGNOSIS — E11.9 TYPE 2 DIABETES, HBA1C GOAL < 8% (H): Primary | ICD-10-CM

## 2023-11-01 RX ORDER — INSULIN LISPRO 100 [IU]/ML
50 INJECTION, SOLUTION INTRAVENOUS; SUBCUTANEOUS
Qty: 140 ML | Refills: 1 | Status: SHIPPED | OUTPATIENT
Start: 2023-11-01 | End: 2023-12-11

## 2023-11-01 NOTE — TELEPHONE ENCOUNTER
Returned call and spoke with patient.  In short, sounds as though patient is in the Medicare coverage gap.  Therefore reports next 3 month supply of Novolog would be over $4000!   Currently reports taking Novolog as 50 units TID.  She would like to change from Novolog to Humalog U100 as she can get a month supply of Humalog U100 for $35/month.  If in agreement, please:  -sign pended order to patient's pharmacy for Humalog U100  -discontinue Novolog prescription  -place new referral to diabetes education (please note Medicare requires this be placed by provider and not a member of their team).    Otherwise, provide alternate plan.    Jazzmine Lee, MPH, RD, CDCES, LD 11/1/2023

## 2023-11-03 NOTE — TELEPHONE ENCOUNTER
Disp Refills Start End TONI   minoxidil (LONITEN) 2.5 MG tablet 45 tablet 3 10/27/2023  No   Sig - Route: Take 1 tablet (2.5 mg) by mouth daily 1/2 daily - Oral   Sent to pharmacy as: Minoxidil 2.5 MG Oral Tablet (LONITEN)       [facility] calling to clarify prescription sig.     Per 10/26/23 telephone encounter:    Ketan Narayan MD   to Jessica Bass RN JR    10/27/23  5:37 PM  I have sent a prescription.  She should take 1/2 daily.   It can cause dizziness so she should monitor for that.  Dr. Helene MD      Instructions relayed to pharmacy as take 1/2 tab daily. Pharmacist read prescription back to writer for verification and had no further questions at time of call.

## 2023-12-10 DIAGNOSIS — E11.9 TYPE 2 DIABETES, HBA1C GOAL < 8% (H): Primary | ICD-10-CM

## 2023-12-11 ENCOUNTER — LAB (OUTPATIENT)
Dept: LAB | Facility: CLINIC | Age: 83
End: 2023-12-11
Payer: COMMERCIAL

## 2023-12-11 ENCOUNTER — TELEPHONE (OUTPATIENT)
Dept: EDUCATION SERVICES | Facility: CLINIC | Age: 83
End: 2023-12-11

## 2023-12-11 ENCOUNTER — ALLIED HEALTH/NURSE VISIT (OUTPATIENT)
Dept: EDUCATION SERVICES | Facility: CLINIC | Age: 83
End: 2023-12-11
Payer: COMMERCIAL

## 2023-12-11 VITALS — WEIGHT: 205 LBS | BODY MASS INDEX: 33.09 KG/M2

## 2023-12-11 DIAGNOSIS — E11.9 TYPE 2 DIABETES, HBA1C GOAL < 8% (H): ICD-10-CM

## 2023-12-11 DIAGNOSIS — E11.9 TYPE 2 DIABETES, HBA1C GOAL < 8% (H): Primary | ICD-10-CM

## 2023-12-11 LAB — HBA1C MFR BLD: 6.2 % (ref 0–5.6)

## 2023-12-11 PROCEDURE — 83036 HEMOGLOBIN GLYCOSYLATED A1C: CPT

## 2023-12-11 PROCEDURE — G0108 DIAB MANAGE TRN  PER INDIV: HCPCS

## 2023-12-11 PROCEDURE — 99207 PR NO CHARGE NURSE ONLY: CPT

## 2023-12-11 PROCEDURE — 36415 COLL VENOUS BLD VENIPUNCTURE: CPT

## 2023-12-11 RX ORDER — TIRZEPATIDE 5 MG/.5ML
5 INJECTION, SOLUTION SUBCUTANEOUS
Qty: 2 ML | Refills: 1 | Status: SHIPPED | OUTPATIENT
Start: 2024-01-22 | End: 2024-01-29

## 2023-12-11 RX ORDER — TIRZEPATIDE 2.5 MG/.5ML
2.5 INJECTION, SOLUTION SUBCUTANEOUS
Qty: 2 ML | Refills: 0 | Status: SHIPPED | OUTPATIENT
Start: 2023-12-11 | End: 2024-09-09 | Stop reason: DRUGHIGH

## 2023-12-11 NOTE — LETTER
12/11/2023         RE: Kelley Styles  [address] [id number]        Dear Colleague,    Thank you for referring your patient, Kelley Styles, to the [facility]. Please see a copy of my visit note below.    Diabetes Self-Management Education & Support    Presents for: Individual review    Type of Service: In Person Visit      ASSESSMENT:  Kelley is a very pleasant 83-year-old who returns to clinic today for 6-month routine office visit to review blood glucose, medications, recheck A1c and assess/assist her with her current diabetes self-management skills as needed.  She arrived today unaccompanied.  Medications were reviewed and Kelley confirms that she is currently taking 40 to 50 units of Lantus SC daily and Novolog with meals.  Generally around 50 units per meal.  She is currently using the [employer] personal CGM to monitor her blood glucose and has a reader which we downloaded reviewed and discussed her results today.  Prior to her appointment we did recheck her A1c and was pleased to see it returned even lower at 6.2%.  I congratulated her on this and encouraged her to keep up the good work.  Kelley is retired  and lives independently.  To our office visit today I did review her most recent office visit notes as well as lab results.    Patient's most recent   Lab Results   Component Value Date    A1C 6.2 12/11/2023     is meeting goal of <8.0    Diabetes knowledge and skills assessment:   Patient is knowledgeable in diabetes management concepts related to: Healthy Eating, Monitoring, Reducing Risks, and Healthy Coping    Continue education with the following diabetes management concepts: Being Active and Reducing Risks    Based on learning assessment above, most appropriate setting for further diabetes education would be: Individual setting.      PLAN  See Patient Instructions for co-developed, patient-stated behavior change goals.  AVS printed and  "provided to patient today. See Follow-Up section for recommended follow-up.         Topics to cover at upcoming visits: Healthy Eating, Being Active, Reducing Risks, and Healthy Coping    Follow-up: 1/29/2024    See Care Plan for co-developed, patient-state behavior change goals.  AVS provided for patient today.    Education Materials Provided:  No new materials provided today      SUBJECTIVE/OBJECTIVE:  Presents for: Individual review  Accompanied by: Self  Diabetes education in the past 24 mo: Yes ([facility] 6/19/23)  Focus of Visit: Assistance w/ making life changes, Self-care behavioral goal setting, Taking Medication, Healthy Eating, Being Active, Reducing Risks  Diabetes type: Type 2  Date of diagnosis: > 10 yrs  Disease course: Stable  Other concerns:: None  Cultural Influences/Ethnic Background:  Not  or       Diabetes Symptoms & Complications:  Fatigue: Sometimes  Symptom course: Stable  Weight trend: Stable  Complications assessed today?: No    Patient Problem List and Family Medical History reviewed for relevant medical history, current medical status, and diabetes risk factors.    Vitals:  Wt 93 kg (205 lb)   LMP  (LMP Unknown)   BMI 33.09 kg/m    Estimated body mass index is 33.09 kg/m  as calculated from the following:    Height as of 7/20/23: 1.676 m (5' 6\").    Weight as of this encounter: 93 kg (205 lb).   Last 3 BP:   BP Readings from Last 3 Encounters:   07/20/23 138/65   12/19/22 120/62   08/31/22 104/60       History   Smoking Status     Never   Smokeless Tobacco     Never       Labs:  Lab Results   Component Value Date    A1C 6.2 12/11/2023     Lab Results   Component Value Date     07/20/2023     12/10/2021     Lab Results   Component Value Date    LDL 54 12/07/2020    LDL 82 10/14/2019     Direct Measure HDL   Date Value Ref Range Status   12/07/2020 39 (L) >=50 mg/dL Final   ]  GFR Estimate   Date Value Ref Range Status   07/20/2023 72 >60 mL/min/1.73m2 Final " "  12/07/2020 50 (L) >60 mL/min/1.73m2 Final     GFR Estimate If Black   Date Value Ref Range Status   12/07/2020 60 (L) >60 mL/min/1.73m2 Final     Lab Results   Component Value Date    CR 0.82 07/20/2023     No results found for: \"MICROALBUMIN\"    Healthy Eating:  Healthy Eating Assessed Today: Yes  Cultural/Sabianist diet restrictions?: No  Meal planning/habits: Smaller portions, Low carb  Meals include: Breakfast, Lunch, Dinner  Breakfast: egg, 1 slice bread in air fryer with butter  Lunch: sandwich or soup  Dinner: + protein, sometimes a veg- sometimes leftovers from lunch  Snacks: homemade pickles, hard boiled eggs, raw veggies  Other: working on increasing more vegetables in diet, working on 60 grams of protein daily  Beverages: Water, Coffee, Tea, Milk (crystal light / IT)  Has patient met with a dietitian in the past?: No    Being Active:  Being Active Assessed Today: Yes  Exercise:: Currently not exercising (she has been cleaning out her basement keeping her busy / active)  Barrier to exercise: None, Physical limitation (knee)    Monitoring:  Monitoring Assessed Today: Yes  Did patient bring glucose meter to appointment? : Yes ([employer])  Blood Glucose Meter: CGM (reports several problems with sensors - has had to replace them > 6 times)  Times checking blood sugar at home (number): 5+  Times checking blood sugar at home (per): Day  Blood glucose trend: No change (stable)    Blood glucose data was reviewed and discussed with Kelley during our visit today.  Noted she did have 4 hypoglycemic events in the 2 weeks of data with 1 reading in the upper 50's and the rest were in the upper 60's.  This were discussed and it was concluded that the occurred due to her over correcting of her mealtime insulin.  I did advise her today to try to stick to a specific dose of the basal insulin and recommended 40 units SC daily.  Expressed interest in starting another GLP-1 as she had success with the Ozempic however " stopped using it because of constipation.  She asked about Mounjaro and I told her I would be open to certainly having her trial it to see if she is/would be able to tolerate it with no adverse reactions.  Will meet with her again in clinic after she has completed the 2.5 mg regimen and has started the 5 mg regimen.    Taking Medications:  Diabetes Medication(s)       Insulin       insulin aspart (NOVOLOG VIAL) 100 UNITS/ML vial    Inject 50 Units Subcutaneous 3 times daily (with meals)     LANTUS VIAL 100 UNIT/ML soln    Inject 47 Units Subcutaneous At Bedtime     insulin lispro (HUMALOG VIAL) 100 UNIT/ML vial    Inject 50 Units Subcutaneous 3 times daily (before meals)     Patient not taking: Reported on 12/11/2023      Incretin Mimetic Agents       tirzepatide (MOUNJARO) 2.5 MG/0.5ML pen    Inject 2.5 mg Subcutaneous every 7 days     tirzepatide (MOUNJARO) 5 MG/0.5ML pen    Inject 5 mg Subcutaneous every 7 days            Taking Medication Assessed Today: Yes  Current Treatments: Insulin Injections  Dose schedule: Pre-breakfast, Pre-lunch, Pre-dinner, At bedtime  Given by: Patient  Injection/Infusion sites: Abdomen  Problems taking diabetes medications regularly?: No  Diabetes medication side effects?: No    Problem Solving:  Problem Solving Assessed Today: Yes  Is the patient at risk for hypoglycemia?: Yes  Hypoglycemia Frequency: Weekly  Hypoglycemia Treatment: Juice, Other food    Hypoglycemia symptoms  Confusion: Yes  Feeling shaky: Yes    Hypoglycemia Complications  Blackouts: No  Hospitalization: No  Nocturnal hypoglycemia: Yes  Required assistance: No  Required glucagon injection: No  Seizures: No    Reducing Risks:  Reducing Risks Assessed Today: Yes  Diabetes Risks: Age over 45 years, Sedentary Lifestyle, Hyperlipidemia  CAD Risks: Sedentary lifestyle, Post-menopausal, Diabetes Mellitus, Family history, Dyslipidemia  Has dilated eye exam at least once a year?: Yes  Feet checked by healthcare provider in  the last year?: Yes    Healthy Coping:  Healthy Coping Assessed Today: Yes  Emotional response to diabetes: Ready to learn  Informal Support system:: Family, Friends  Stage of change: ACTION (Actively working towards change)  Support resources: In-person Offerings  Patient Activation Measure Survey Score:       No data to display                  Care Plan and Education Provided:  GLP-1 administration technique taught today. Patient verbalized understanding and was able to perform an accurate return demonstration of administration technique. Side effects were discussed, if patient has any abdominal pain, with or without nausea and/or vomiting, stop medication, call provider, clinic or go to the emergency room.  Care Plan: Diabetes   Updates made by Sandrine Cornell RN since 12/11/2023 12:00 AM        Problem: Diabetes Self-Management Education Needed to Optimize Self-Care Behaviors         Goal: Healthy Eating - follow a healthy eating pattern for diabetes    This Visit's Progress: 90%   Recent Progress: 80%   Note:    Reviewed importance of balance in meals. Inclusion of protein       Goal: Monitoring - monitor glucose and ketones as directed    This Visit's Progress: 100%   Recent Progress: 80%        Goal: Taking Medication - patient is consistently taking medications as directed    This Visit's Progress: 90%   Recent Progress: 90%   Note:    Reviewed dosing , timing        Goal: Reducing Risks - know how to prevent and treat long-term diabetes complications    This Visit's Progress: 60%   Recent Progress: 30%        Goal: Healthy Coping - use available resources to cope with the challenges of managing diabetes    This Visit's Progress: 80%   Recent Progress: 70%          Thank you,  Sandrine Cornell RN [facility]  Certified Diabetes Care and   Visit type : DSMT      Time Spent: 60 minutes  Encounter Type: Individual    Any diabetes medication dose changes were made via the CDE Protocol per the  patient's referring provider and primary care provider. A copy of this encounter was shared with the provider.     Much or all of the text in this note was generated through the use of the Dragon Dictate voice-to-text software.Errors in spelling or words which seem out of context are unintentional. Sound alike errors, in particular, may have escaped editing.

## 2023-12-11 NOTE — PATIENT INSTRUCTIONS
1. Eat 3 balanced meals each day - Monitor carb intake and limit to 45-60 grams per meal  This would be equal to 3-4 choices ~  1 choice = 15 grams    Do not wait longer than 4-5 hours to eat something  Snacks limit to no more than 30 grams of carbohydrates or 2 choices  Make sure you include protein source with each meal and at bedtime - this has been shown to help with blood glucose elevations    2. Check blood sugars several  times each day   Fasting and before meal target is 80 - 130   2 hours after a meal target is < 180  remember to bring meter and log book to all appointments    3. Incorporate 30 minutes activity into each day - does not need to be all at one time & walking counts    4. Take diabetes medications as prescribed Continue Lantus 40 units daily, and Novolog with meals   When you get the Mounjaro - you will start with one injection once a week on the same day 2.5 mg for the first 4 weeks , then we will increase your dose to 5 mg weekly (this will be a new set of 4 pens)  - this should happen around the third week in January   When you start the 5 mg , I want to reduce your Novolog by 5 units     If you start to have low blood sugars call !!!       Thank you for coming in to see me today !      I value your experience and would be very thankful for your time in providing feedback in our clinic survey.    You may receive an e-mail, text message or even something in the mail from [employer].  This is a survey to let us know how we are doing - the survey will be related to your diabetes education and me.

## 2023-12-11 NOTE — PROGRESS NOTES
Diabetes Self-Management Education & Support    Presents for: Individual review    Type of Service: In Person Visit      ASSESSMENT:  Kelley is a very pleasant 83-year-old who returns to clinic today for 6-month routine office visit to review blood glucose, medications, recheck A1c and assess/assist her with her current diabetes self-management skills as needed.  She arrived today unaccompanied.  Medications were reviewed and Kelley confirms that she is currently taking 40 to 50 units of Lantus SC daily and Novolog with meals.  Generally around 50 units per meal.  She is currently using the D[employer] personal CGM to monitor her blood glucose and has a reader which we downloaded reviewed and discussed her results today.  Prior to her appointment we did recheck her A1c and was pleased to see it returned even lower at 6.2%.  I congratulated her on this and encouraged her to keep up the good work.  Kelley is retired  and lives independently.  To our office visit today I did review her most recent office visit notes as well as lab results.    Patient's most recent   Lab Results   Component Value Date    A1C 6.2 12/11/2023     is meeting goal of <8.0    Diabetes knowledge and skills assessment:   Patient is knowledgeable in diabetes management concepts related to: Healthy Eating, Monitoring, Reducing Risks, and Healthy Coping    Continue education with the following diabetes management concepts: Being Active and Reducing Risks    Based on learning assessment above, most appropriate setting for further diabetes education would be: Individual setting.      PLAN  See Patient Instructions for co-developed, patient-stated behavior change goals.  AVS printed and provided to patient today. See Follow-Up section for recommended follow-up.         Topics to cover at upcoming visits: Healthy Eating, Being Active, Reducing Risks, and Healthy Coping    Follow-up: 1/29/2024    See Care Plan for co-developed, patient-state  "behavior change goals.  AVS provided for patient today.    Education Materials Provided:  No new materials provided today      SUBJECTIVE/OBJECTIVE:  Presents for: Individual review  Accompanied by: Self  Diabetes education in the past 24 mo: Yes ([facility] 6/19/23)  Focus of Visit: Assistance w/ making life changes, Self-care behavioral goal setting, Taking Medication, Healthy Eating, Being Active, Reducing Risks  Diabetes type: Type 2  Date of diagnosis: > 10 yrs  Disease course: Stable  Other concerns:: None  Cultural Influences/Ethnic Background:  Not  or       Diabetes Symptoms & Complications:  Fatigue: Sometimes  Symptom course: Stable  Weight trend: Stable  Complications assessed today?: No    Patient Problem List and Family Medical History reviewed for relevant medical history, current medical status, and diabetes risk factors.    Vitals:  Wt 93 kg (205 lb)   LMP  (LMP Unknown)   BMI 33.09 kg/m    Estimated body mass index is 33.09 kg/m  as calculated from the following:    Height as of 7/20/23: 1.676 m (5' 6\").    Weight as of this encounter: 93 kg (205 lb).   Last 3 BP:   BP Readings from Last 3 Encounters:   07/20/23 138/65   12/19/22 120/62   08/31/22 104/60       History   Smoking Status    Never   Smokeless Tobacco    Never       Labs:  Lab Results   Component Value Date    A1C 6.2 12/11/2023     Lab Results   Component Value Date     07/20/2023     12/10/2021     Lab Results   Component Value Date    LDL 54 12/07/2020    LDL 82 10/14/2019     Direct Measure HDL   Date Value Ref Range Status   12/07/2020 39 (L) >=50 mg/dL Final   ]  GFR Estimate   Date Value Ref Range Status   07/20/2023 72 >60 mL/min/1.73m2 Final   12/07/2020 50 (L) >60 mL/min/1.73m2 Final     GFR Estimate If Black   Date Value Ref Range Status   12/07/2020 60 (L) >60 mL/min/1.73m2 Final     Lab Results   Component Value Date    CR 0.82 07/20/2023     No results found for: \"MICROALBUMIN\"    Healthy " Eating:  Healthy Eating Assessed Today: Yes  Cultural/Mandaen diet restrictions?: No  Meal planning/habits: Smaller portions, Low carb  Meals include: Breakfast, Lunch, Dinner  Breakfast: egg, 1 slice bread in air fryer with butter  Lunch: sandwich or soup  Dinner: + protein, sometimes a veg- sometimes leftovers from lunch  Snacks: homemade pickles, hard boiled eggs, raw veggies  Other: working on increasing more vegetables in diet, working on 60 grams of protein daily  Beverages: Water, Coffee, Tea, Milk (crystal light / IT)  Has patient met with a dietitian in the past?: No    Being Active:  Being Active Assessed Today: Yes  Exercise:: Currently not exercising (she has been cleaning out her basement keeping her busy / active)  Barrier to exercise: None, Physical limitation (knee)    Monitoring:  Monitoring Assessed Today: Yes  Did patient bring glucose meter to appointment? : Yes (Liane reader)  Blood Glucose Meter: CGM (reports several problems with sensors - has had to replace them > 6 times)  Times checking blood sugar at home (number): 5+  Times checking blood sugar at home (per): Day  Blood glucose trend: No change (stable)    Blood glucose data was reviewed and discussed with Kelley during our visit today.  Noted she did have 4 hypoglycemic events in the 2 weeks of data with 1 reading in the upper 50's and the rest were in the upper 60's.  This were discussed and it was concluded that the occurred due to her over correcting of her mealtime insulin.  I did advise her today to try to stick to a specific dose of the basal insulin and recommended 40 units SC daily.  Expressed interest in starting another GLP-1 as she had success with the Ozempic however stopped using it because of constipation.  She asked about Mounjaro and I told her I would be open to certainly having her trial it to see if she is/would be able to tolerate it with no adverse reactions.  Will meet with her again in clinic after she has  completed the 2.5 mg regimen and has started the 5 mg regimen.    Taking Medications:  Diabetes Medication(s)       Insulin       insulin aspart (NOVOLOG VIAL) 100 UNITS/ML vial    Inject 50 Units Subcutaneous 3 times daily (with meals)     LANTUS VIAL 100 UNIT/ML soln    Inject 47 Units Subcutaneous At Bedtime     insulin lispro (HUMALOG VIAL) 100 UNIT/ML vial    Inject 50 Units Subcutaneous 3 times daily (before meals)     Patient not taking: Reported on 12/11/2023      Incretin Mimetic Agents       tirzepatide (MOUNJARO) 2.5 MG/0.5ML pen    Inject 2.5 mg Subcutaneous every 7 days     tirzepatide (MOUNJARO) 5 MG/0.5ML pen    Inject 5 mg Subcutaneous every 7 days            Taking Medication Assessed Today: Yes  Current Treatments: Insulin Injections  Dose schedule: Pre-breakfast, Pre-lunch, Pre-dinner, At bedtime  Given by: Patient  Injection/Infusion sites: Abdomen  Problems taking diabetes medications regularly?: No  Diabetes medication side effects?: No    Problem Solving:  Problem Solving Assessed Today: Yes  Is the patient at risk for hypoglycemia?: Yes  Hypoglycemia Frequency: Weekly  Hypoglycemia Treatment: Juice, Other food    Hypoglycemia symptoms  Confusion: Yes  Feeling shaky: Yes    Hypoglycemia Complications  Blackouts: No  Hospitalization: No  Nocturnal hypoglycemia: Yes  Required assistance: No  Required glucagon injection: No  Seizures: No    Reducing Risks:  Reducing Risks Assessed Today: Yes  Diabetes Risks: Age over 45 years, Sedentary Lifestyle, Hyperlipidemia  CAD Risks: Sedentary lifestyle, Post-menopausal, Diabetes Mellitus, Family history, Dyslipidemia  Has dilated eye exam at least once a year?: Yes  Feet checked by healthcare provider in the last year?: Yes    Healthy Coping:  Healthy Coping Assessed Today: Yes  Emotional response to diabetes: Ready to learn  Informal Support system:: Family, Friends  Stage of change: ACTION (Actively working towards change)  Support resources: In-person  Offerings  Patient Activation Measure Survey Score:       No data to display                  Care Plan and Education Provided:  GLP-1 administration technique taught today. Patient verbalized understanding and was able to perform an accurate return demonstration of administration technique. Side effects were discussed, if patient has any abdominal pain, with or without nausea and/or vomiting, stop medication, call provider, clinic or go to the emergency room.  Care Plan: Diabetes   Updates made by Sandrine Cornell RN since 12/11/2023 12:00 AM        Problem: Diabetes Self-Management Education Needed to Optimize Self-Care Behaviors         Goal: Healthy Eating - follow a healthy eating pattern for diabetes    This Visit's Progress: 90%   Recent Progress: 80%   Note:    Reviewed importance of balance in meals. Inclusion of protein       Goal: Monitoring - monitor glucose and ketones as directed    This Visit's Progress: 100%   Recent Progress: 80%        Goal: Taking Medication - patient is consistently taking medications as directed    This Visit's Progress: 90%   Recent Progress: 90%   Note:    Reviewed dosing , timing        Goal: Reducing Risks - know how to prevent and treat long-term diabetes complications    This Visit's Progress: 60%   Recent Progress: 30%        Goal: Healthy Coping - use available resources to cope with the challenges of managing diabetes    This Visit's Progress: 80%   Recent Progress: 70%          Thank you,  Sandrine Cornell RN [facility]  Certified Diabetes Care and   Visit type : DSMT      Time Spent: 60 minutes  Encounter Type: Individual    Any diabetes medication dose changes were made via the CDE Protocol per the patient's referring provider and primary care provider. A copy of this encounter was shared with the provider.     Much or all of the text in this note was generated through the use of the Dragon Dictate voice-to-text software.Errors in spelling or words which  seem out of context are unintentional. Sound alike errors, in particular, may have escaped editing.

## 2023-12-11 NOTE — TELEPHONE ENCOUNTER
Prior Authorization Retail Medication Request    Medication/Dose: Mounjaro 2.5 mg  once weekly  Diagnosis and ICD code (if different than what is on RX):  E11.9  New/renewal/insurance change PA/secondary ins. PA:  Previously Tried and Failed:  Ozempic  Rationale:  caused severe constipation     Insurance   Primary: [employer]  Insurance ID:  [id number]    Secondary (if applicable):  Insurance ID:      Pharmacy Information (if different than what is on RX)  [facility]  Phone:    Fax:

## 2023-12-13 NOTE — TELEPHONE ENCOUNTER
Central Prior Authorization Team   Phone: [phone]    PA Initiation    Medication: Mounjaro 2.5 mg SC weekly   Insurance Company: [facility] Non-Specialty PA's - Phone [phone] Fax [phone]  Ph[facility]illing the Rx[facility] - [facility]  Filling Pharmacy Phone: [phone]  Filling Pharmacy Fax:    Start Date: 12/13/2023

## 2023-12-14 NOTE — TELEPHONE ENCOUNTER
Prior Authorization Not Needed per Insurance    Medication: Mounjaro 2.5 mg SC weekly   Insurance Company: [facility] Non-Specialty PA's - Phone [phone] Fax [phone]  Expected CoPay:      Pharmacy Filling the Rx: [facility] - [facility]  Pharmacy Notified:  Yes  Patient Notified:  NO

## 2024-01-24 DIAGNOSIS — E11.9 TYPE 2 DIABETES MELLITUS WITHOUT COMPLICATION, WITH LONG-TERM CURRENT USE OF INSULIN (H): ICD-10-CM

## 2024-01-24 DIAGNOSIS — Z76.0 ENCOUNTER FOR MEDICATION REFILL: ICD-10-CM

## 2024-01-24 DIAGNOSIS — E78.00 HYPERCHOLESTEROLEMIA: ICD-10-CM

## 2024-01-24 DIAGNOSIS — Z79.4 TYPE 2 DIABETES MELLITUS WITHOUT COMPLICATION, WITH LONG-TERM CURRENT USE OF INSULIN (H): ICD-10-CM

## 2024-01-24 RX ORDER — INSULIN ASPART 100 [IU]/ML
50 INJECTION, SOLUTION INTRAVENOUS; SUBCUTANEOUS
Qty: 140 ML | Refills: 3 | Status: SHIPPED | OUTPATIENT
Start: 2024-01-24

## 2024-01-24 RX ORDER — ATORVASTATIN CALCIUM 40 MG/1
40 TABLET, FILM COATED ORAL AT BEDTIME
Qty: 90 TABLET | Refills: 3 | Status: SHIPPED | OUTPATIENT
Start: 2024-01-24

## 2024-01-29 ENCOUNTER — ALLIED HEALTH/NURSE VISIT (OUTPATIENT)
Dept: EDUCATION SERVICES | Facility: CLINIC | Age: 84
End: 2024-01-29
Payer: COMMERCIAL

## 2024-01-29 VITALS — WEIGHT: 194 LBS | BODY MASS INDEX: 31.31 KG/M2

## 2024-01-29 DIAGNOSIS — E11.9 TYPE 2 DIABETES, HBA1C GOAL < 8% (H): ICD-10-CM

## 2024-01-29 PROCEDURE — G0108 DIAB MANAGE TRN  PER INDIV: HCPCS

## 2024-01-29 PROCEDURE — 99207 PR NO CHARGE NURSE ONLY: CPT

## 2024-01-29 RX ORDER — TIRZEPATIDE 5 MG/.5ML
5 INJECTION, SOLUTION SUBCUTANEOUS
Qty: 2 ML | Refills: 1 | Status: SHIPPED | OUTPATIENT
Start: 2024-01-29 | End: 2024-03-28

## 2024-01-29 NOTE — LETTER
1/29/2024         RE: Kelley Styles  [address] [id number]        Dear Colleague,    Thank you for referring your patient, Kelley Styles, to the [facility]. Please see a copy of my visit note below.    Diabetes Self-Management Education & Support    Presents for:      Type of Service: In Person Visit      ASSESSMENT:  Kelley is a very pleasant 83-year-old who returns to clinic today for routine office visit to review blood glucose, medications, and to assess/assist her with current diabetes self-management skills where needed.  She arrived today unaccompanied was using a walker.  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  Medications were reviewed and Kelley confirms she is currently taking 2.5 mg of Mounjaro SC weekly (she has 2 pens left), 40 units of Lantus SC each evening, and Novolog with meals.  Based upon the meals she says she gives herself between 35 to 40 units.  She is currently using a personal CGM to monitor her blood glucose and had her reader with her today.  Overall she continues to do quite well her most recent A1c done on December 11 was 6.2% and her time in range today was 97%.  Based upon her blood glucose reviewed today I instructed her to decrease her Lantus to 35 units and informed her that when she does increase dose of Mounjaro to 5 mg, I anticipate her insulin needs will be less and we will decrease her insulin again.  Prior to her visit today I did review her most recent office visit notes as well as lab results.  Kelley is  and lives independently.  She relies on [employer] for transportation.  Instructed her to call with any questions or concerns that might come up before next visit in March at which time we will recheck her A1c.    Patient's most recent   Lab Results   Component Value Date    A1C 6.2 12/11/2023     is meeting  "goal of <8.0    Diabetes knowledge and skills assessment:   Patient is knowledgeable in diabetes management concepts related to: Healthy Eating, Monitoring, Taking Medication, and Healthy Coping    Continue education with the following diabetes management concepts: Monitoring, Taking Medication, Problem Solving, and Reducing Risks    Based on learning assessment above, most appropriate setting for further diabetes education would be: Individual setting.      PLAN    See Patient Instructions for co-developed, patient-stated behavior change goals.  AVS printed and provided to patient today. See Follow-Up section for recommended follow-up.       Topics to cover at upcoming visits: Healthy Eating, Monitoring, Taking Medication, and Reducing Risks    Follow-up: 3/25/24    See Care Plan for co-developed, patient-state behavior change goals.  AVS provided for patient today.    Education Materials Provided:  No new materials provided today      SUBJECTIVE/OBJECTIVE:     Cultural Influences/Ethnic Background:  Not  or       Diabetes Symptoms & Complications:          Patient Problem List and Family Medical History reviewed for relevant medical history, current medical status, and diabetes risk factors.    Vitals:  Wt 88 kg (194 lb)   LMP  (LMP Unknown)   BMI 31.31 kg/m    Estimated body mass index is 31.31 kg/m  as calculated from the following:    Height as of 7/20/23: 1.676 m (5' 6\").    Weight as of this encounter: 88 kg (194 lb).   Last 3 BP:   BP Readings from Last 3 Encounters:   07/20/23 138/65   12/19/22 120/62   08/31/22 104/60       History   Smoking Status     Never   Smokeless Tobacco     Never       Labs:  Lab Results   Component Value Date    A1C 6.2 12/11/2023     Lab Results   Component Value Date     07/20/2023     12/10/2021     Lab Results   Component Value Date    LDL 54 12/07/2020    LDL 82 10/14/2019     Direct Measure HDL   Date Value Ref Range Status   12/07/2020 39 (L) " ">=50 mg/dL Final   ]  GFR Estimate   Date Value Ref Range Status   07/20/2023 72 >60 mL/min/1.73m2 Final   12/07/2020 50 (L) >60 mL/min/1.73m2 Final     GFR Estimate If Black   Date Value Ref Range Status   12/07/2020 60 (L) >60 mL/min/1.73m2 Final     Lab Results   Component Value Date    CR 0.82 07/20/2023     No results found for: \"MICROALBUMIN\"    Healthy Eating:  Healthy Eating Assessed Today: Yes  Cultural/Nondenominational diet restrictions?: No  Do you have any food allergies or intolerances?: No  Meal planning/habits: Smaller portions, Low carb  Who cooks/prepares meals for you?: Self  Who purchases food in  your home?: Self  How many times a week on average do you eat food made away from home (restaurant/take-out)?: 0  Meals include: Breakfast, Lunch, Dinner  Breakfast: egg, 1 slice bread in air fryer with butter or Ensure  Lunch: often leftovers  Dinner: + protein, + vegetable  Snacks: evenings - snack 50% time- cheese or glass of milk, protein bars ( 17 CHO)  Other: often will make something big and spread it out to eat over the week ( like a casserole)  Beverages: Water, Coffee, Tea, Milk (crystal light / IT)  Has patient met with a dietitian in the past?: No    Being Active:  Being Active Assessed Today: Yes  Exercise:: Currently not exercising (she has been cleaning out her basement keeping her busy / active)  Barrier to exercise: None, Physical limitation, Safety (knee- torn cartilage/ arthritis and hip arthritis)    Monitoring:  Monitoring Assessed Today: Yes  Did patient bring glucose meter to appointment? : Yes (Liane reader)  Blood Glucose Meter: CGM (reports several problems with sensors - has had to replace them > 6 times)  Times checking blood sugar at home (number): 5+  Times checking blood sugar at home (per): Day  Blood glucose trend: No change (stable)        Taking Medications:  Diabetes Medication(s)       Insulin       insulin aspart (NOVOLOG VIAL) 100 UNITS/ML vial Inject 50 Units Subcutaneous " 3 times daily (with meals)     LANTUS VIAL 100 UNIT/ML soln Inject 47 Units Subcutaneous At Bedtime       Incretin Mimetic Agents       tirzepatide (MOUNJARO) 5 MG/0.5ML pen Inject 5 mg Subcutaneous every 7 days     tirzepatide (MOUNJARO) 2.5 MG/0.5ML pen Inject 2.5 mg Subcutaneous every 7 days            Taking Medication Assessed Today: Yes  Current Treatments: Insulin Injections, Non-insulin Injectables  Dose schedule: Pre-breakfast, Pre-lunch, Pre-dinner, At bedtime  Given by: Patient  Injection/Infusion sites: Abdomen  Problems taking diabetes medications regularly?: No  Diabetes medication side effects?: No    Problem Solving:  Problem Solving Assessed Today: Yes  Is the patient at risk for hypoglycemia?: Yes  Hypoglycemia Frequency: Weekly (alarm sounds when BG reaches 65 - pt corrects on that)  Hypoglycemia Treatment: Juice, Other food  Does patient have glucagon emergency kit?: No    Hypoglycemia Symptoms  Hypoglycemia: None (alarms sound before she gets symptomatic)         Reducing Risks:  Reducing Risks Assessed Today: Yes  Diabetes Risks: Age over 45 years, Sedentary Lifestyle, Hyperlipidemia  CAD Risks: Sedentary lifestyle, Post-menopausal, Diabetes Mellitus, Family history, Dyslipidemia  Has dilated eye exam at least once a year?: Yes  Feet checked by healthcare provider in the last year?: Yes    Healthy Coping:  Healthy Coping Assessed Today: Yes  Emotional response to diabetes: Ready to learn  Informal Support system:: Family, Friends  Stage of change: ACTION (Actively working towards change)  Support resources: In-person Offerings  Patient Activation Measure Survey Score:       No data to display                  Care Plan and Education Provided:  Care Plan: Diabetes   Updates made by Sandrine Cornell RN since 1/29/2024 12:00 AM        Problem: Diabetes Self-Management Education Needed to Optimize Self-Care Behaviors         Goal: Healthy Eating - follow a healthy eating pattern for diabetes     This Visit's Progress: 90%   Recent Progress: 90%   Note:    Reviewed importance of balance in meals. Inclusion of protein       Goal: Monitoring - monitor glucose and ketones as directed    This Visit's Progress: 100%   Recent Progress: 100%        Goal: Taking Medication - patient is consistently taking medications as directed    This Visit's Progress: 100%   Recent Progress: 90%   Note:    Reviewed dosing , timing        Goal: Reducing Risks - know how to prevent and treat long-term diabetes complications    This Visit's Progress: 80%   Recent Progress: 60%        Goal: Healthy Coping - use available resources to cope with the challenges of managing diabetes    This Visit's Progress: 90%   Recent Progress: 80%          Thank you,  Sandrine Vu RN [facility]  Certified Diabetes Care and   Visit type : DSMT      Time Spent: 30 minutes  Encounter Type: Individual    Any diabetes medication dose changes were made via the CDE Protocol per the patient's referring provider and primary care provider. A copy of this encounter was shared with the provider.     Much or all of the text in this note was generated through the use of the Dragon Dictate voice-to-text software.Errors in spelling or words which seem out of context are unintentional. Sound alike errors, in particular, may have escaped editing.

## 2024-01-29 NOTE — PATIENT INSTRUCTIONS
1. Eat 3 balanced meals each day - Monitor carb intake and limit to 45-60 grams per meal  This would be equal to 3-4 choices ~  1 choice = 15 grams    Do not wait longer than 4-5 hours to eat something  Snacks limit to no more than 30 grams of carbohydrates or 2 choices  Make sure you include protein source with each meal and at bedtime - this has been shown to help with blood glucose elevations    2. Check blood sugars several  times each day   Fasting and before meal target is 80 - 130   2 hours after a meal target is < 180  remember to bring meter and log book to all appointments    3. Incorporate 30 minutes activity into each day - does not need to be all at one time & walking counts    4. Take diabetes medications as prescribed - When you finish up the 2.5 mg syringes of Mounjaro we are increasing the dose to 5 mg weekly , decrease Lantus to 35 units every evening and Novolog with meals 30 for small meal, 40 reg meal     AND QUIT DOUBLE DIPPING AND CHASING THOSE NUMBERS !!!!    CONGRATS ON THAT A1C  !!! KEEP IT UP SISTER      I expect that when you go up to the 5 mg of Mounjaro you will be needing less insulin- if your morning numbers are 110 or below, decrease the Lantus to 30 units - same with the Novolog         Thank you for coming in to see me today !      I value your experience and would be very thankful for your time in providing feedback in our clinic survey.    You may receive an e-mail, text message or even something in the mail from [facility].  This is a survey to let us know how we are doing - the survey will be related to your diabetes education and me.

## 2024-01-29 NOTE — PROGRESS NOTES
Diabetes Self-Management Education & Support    Presents for:      Type of Service: In Person Visit      ASSESSMENT:  Kelley is a very pleasant 83-year-old who returns to clinic today for routine office visit to review blood glucose, medications, and to assess/assist her with current diabetes self-management skills where needed.  She arrived today unaccompanied was using a walker.  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  Medications were reviewed and Kelley confirms she is currently taking 2.5 mg of Mounjaro SC weekly (she has 2 pens left), 40 units of Lantus SC each evening, and Novolog with meals.  Based upon the meals she says she gives herself between 35 to 40 units.  She is currently using a personal CGM to monitor her blood glucose and had her reader with her today.  Overall she continues to do quite well her most recent A1c done on December 11 was 6.2% and her time in range today was 97%.  Based upon her blood glucose reviewed today I instructed her to decrease her Lantus to 35 units and informed her that when she does increase dose of Mounjaro to 5 mg, I anticipate her insulin needs will be less and we will decrease her insulin again.  Prior to her visit today I did review her most recent office visit notes as well as lab results.  Kelley is  and lives independently.  She relies on [employer] for transportation.  Instructed her to call with any questions or concerns that might come up before next visit in March at which time we will recheck her A1c.    Patient's most recent   Lab Results   Component Value Date    A1C 6.2 12/11/2023     is meeting goal of <8.0    Diabetes knowledge and skills assessment:   Patient is knowledgeable in diabetes management concepts related to: Healthy Eating, Monitoring, Taking Medication, and Healthy Coping    Continue education with the following diabetes management  "concepts: Monitoring, Taking Medication, Problem Solving, and Reducing Risks    Based on learning assessment above, most appropriate setting for further diabetes education would be: Individual setting.      PLAN    See Patient Instructions for co-developed, patient-stated behavior change goals.  AVS printed and provided to patient today. See Follow-Up section for recommended follow-up.       Topics to cover at upcoming visits: Healthy Eating, Monitoring, Taking Medication, and Reducing Risks    Follow-up: 3/25/24    See Care Plan for co-developed, patient-state behavior change goals.  AVS provided for patient today.    Education Materials Provided:  No new materials provided today      SUBJECTIVE/OBJECTIVE:     Cultural Influences/Ethnic Background:  Not  or       Diabetes Symptoms & Complications:          Patient Problem List and Family Medical History reviewed for relevant medical history, current medical status, and diabetes risk factors.    Vitals:  Wt 88 kg (194 lb)   LMP  (LMP Unknown)   BMI 31.31 kg/m    Estimated body mass index is 31.31 kg/m  as calculated from the following:    Height as of 7/20/23: 1.676 m (5' 6\").    Weight as of this encounter: 88 kg (194 lb).   Last 3 BP:   BP Readings from Last 3 Encounters:   07/20/23 138/65   12/19/22 120/62   08/31/22 104/60       History   Smoking Status    Never   Smokeless Tobacco    Never       Labs:  Lab Results   Component Value Date    A1C 6.2 12/11/2023     Lab Results   Component Value Date     07/20/2023     12/10/2021     Lab Results   Component Value Date    LDL 54 12/07/2020    LDL 82 10/14/2019     Direct Measure HDL   Date Value Ref Range Status   12/07/2020 39 (L) >=50 mg/dL Final   ]  GFR Estimate   Date Value Ref Range Status   07/20/2023 72 >60 mL/min/1.73m2 Final   12/07/2020 50 (L) >60 mL/min/1.73m2 Final     GFR Estimate If Black   Date Value Ref Range Status   12/07/2020 60 (L) >60 mL/min/1.73m2 Final     Lab " "Results   Component Value Date    CR 0.82 07/20/2023     No results found for: \"MICROALBUMIN\"    Healthy Eating:  Healthy Eating Assessed Today: Yes  Cultural/Alevism diet restrictions?: No  Do you have any food allergies or intolerances?: No  Meal planning/habits: Smaller portions, Low carb  Who cooks/prepares meals for you?: Self  Who purchases food in  your home?: Self  How many times a week on average do you eat food made away from home (restaurant/take-out)?: 0  Meals include: Breakfast, Lunch, Dinner  Breakfast: egg, 1 slice bread in air fryer with butter or Ensure  Lunch: often leftovers  Dinner: + protein, + vegetable  Snacks: evenings - snack 50% time- cheese or glass of milk, protein bars ( 17 CHO)  Other: often will make something big and spread it out to eat over the week ( like a casserole)  Beverages: Water, Coffee, Tea, Milk (crystal light / IT)  Has patient met with a dietitian in the past?: No    Being Active:  Being Active Assessed Today: Yes  Exercise:: Currently not exercising (she has been cleaning out her basement keeping her busy / active)  Barrier to exercise: None, Physical limitation, Safety (knee- torn cartilage/ arthritis and hip arthritis)    Monitoring:  Monitoring Assessed Today: Yes  Did patient bring glucose meter to appointment? : Yes (Liane valenzuela)  Blood Glucose Meter: CGM (reports several problems with sensors - has had to replace them > 6 times)  Times checking blood sugar at home (number): 5+  Times checking blood sugar at home (per): Day  Blood glucose trend: No change (stable)        Taking Medications:  Diabetes Medication(s)       Insulin       insulin aspart (NOVOLOG VIAL) 100 UNITS/ML vial Inject 50 Units Subcutaneous 3 times daily (with meals)     LANTUS VIAL 100 UNIT/ML soln Inject 47 Units Subcutaneous At Bedtime       Incretin Mimetic Agents       tirzepatide (MOUNJARO) 5 MG/0.5ML pen Inject 5 mg Subcutaneous every 7 days     tirzepatide (MOUNJARO) 2.5 MG/0.5ML pen " Inject 2.5 mg Subcutaneous every 7 days            Taking Medication Assessed Today: Yes  Current Treatments: Insulin Injections, Non-insulin Injectables  Dose schedule: Pre-breakfast, Pre-lunch, Pre-dinner, At bedtime  Given by: Patient  Injection/Infusion sites: Abdomen  Problems taking diabetes medications regularly?: No  Diabetes medication side effects?: No    Problem Solving:  Problem Solving Assessed Today: Yes  Is the patient at risk for hypoglycemia?: Yes  Hypoglycemia Frequency: Weekly (alarm sounds when BG reaches 65 - pt corrects on that)  Hypoglycemia Treatment: Juice, Other food  Does patient have glucagon emergency kit?: No    Hypoglycemia Symptoms  Hypoglycemia: None (alarms sound before she gets symptomatic)         Reducing Risks:  Reducing Risks Assessed Today: Yes  Diabetes Risks: Age over 45 years, Sedentary Lifestyle, Hyperlipidemia  CAD Risks: Sedentary lifestyle, Post-menopausal, Diabetes Mellitus, Family history, Dyslipidemia  Has dilated eye exam at least once a year?: Yes  Feet checked by healthcare provider in the last year?: Yes    Healthy Coping:  Healthy Coping Assessed Today: Yes  Emotional response to diabetes: Ready to learn  Informal Support system:: Family, Friends  Stage of change: ACTION (Actively working towards change)  Support resources: In-person Offerings  Patient Activation Measure Survey Score:       No data to display                  Care Plan and Education Provided:  Care Plan: Diabetes   Updates made by Sandrine Cornell RN since 1/29/2024 12:00 AM        Problem: Diabetes Self-Management Education Needed to Optimize Self-Care Behaviors         Goal: Healthy Eating - follow a healthy eating pattern for diabetes    This Visit's Progress: 90%   Recent Progress: 90%   Note:    Reviewed importance of balance in meals. Inclusion of protein       Goal: Monitoring - monitor glucose and ketones as directed    This Visit's Progress: 100%   Recent Progress: 100%        Goal:  Taking Medication - patient is consistently taking medications as directed    This Visit's Progress: 100%   Recent Progress: 90%   Note:    Reviewed dosing , timing        Goal: Reducing Risks - know how to prevent and treat long-term diabetes complications    This Visit's Progress: 80%   Recent Progress: 60%        Goal: Healthy Coping - use available resources to cope with the challenges of managing diabetes    This Visit's Progress: 90%   Recent Progress: 80%          Thank you,  Sandrine Vu RN [facility]  Certified Diabetes Care and   Visit type : DSMT      Time Spent: 30 minutes  Encounter Type: Individual    Any diabetes medication dose changes were made via the CDE Protocol per the patient's referring provider and primary care provider. A copy of this encounter was shared with the provider.     Much or all of the text in this note was generated through the use of the Dragon Dictate voice-to-text software.Errors in spelling or words which seem out of context are unintentional. Sound alike errors, in particular, may have escaped editing.

## 2024-01-30 ENCOUNTER — TRANSFERRED RECORDS (OUTPATIENT)
Dept: HEALTH INFORMATION MANAGEMENT | Facility: CLINIC | Age: 84
End: 2024-01-30
Payer: COMMERCIAL

## 2024-02-19 ENCOUNTER — TELEPHONE (OUTPATIENT)
Dept: INTERNAL MEDICINE | Facility: CLINIC | Age: 84
End: 2024-02-19
Payer: COMMERCIAL

## 2024-02-19 NOTE — TELEPHONE ENCOUNTER
General Call      Reason for Call:  pt this a.m. had a bloody nose, but it has stopped, she is just concerned   Did spit us a clot    Please advise    What are your questions or concerns:  n/a    Date of last appointment with provider: n/a    Could we send this information to you in [facility] or would you prefer to receive a phone call?:   Patient would prefer a phone call   Okay to leave a detailed message?: Yes at Home number on file [phone] (home)

## 2024-02-19 NOTE — TELEPHONE ENCOUNTER
Spoke with patient who states she has not had a recurrence of her bloody nose but did think it was odd that it happened so suddenly. States she did not and still does not have any other symptoms. States she believes it may be from the dry air in her home. Discussed home care for a bloody nose and she verbalized understanding. She has no further questions.

## 2024-03-28 DIAGNOSIS — E11.9 TYPE 2 DIABETES, HBA1C GOAL < 8% (H): ICD-10-CM

## 2024-03-28 RX ORDER — TIRZEPATIDE 5 MG/.5ML
5 INJECTION, SOLUTION SUBCUTANEOUS WEEKLY
Qty: 15 ML | Refills: 3 | Status: SHIPPED | OUTPATIENT
Start: 2024-03-28

## 2024-03-28 NOTE — TELEPHONE ENCOUNTER
Pt is requesting a 90 day supply at the higher dosage please.     Disp Refills Start End TONI   tirzepatide (MOUNJARO) 5 MG/0.5ML pen 2 mL 1 1/29/2024 -- No   Sig - Route: Inject 5 mg Subcutaneous every 7 days - Subcutaneous   Sent to pharmacy as: Mounjaro 5 MG/0.5ML Subcutaneous Solution Pen-injector (tirzepatide)   Class: E-Prescribe   Order: [id number]   E-Prescribing Status: Receipt confirmed by pharmacy (1/29/2024  1:28 PM CST)

## 2024-04-03 ENCOUNTER — TELEPHONE (OUTPATIENT)
Dept: INTERNAL MEDICINE | Facility: CLINIC | Age: 84
End: 2024-04-03
Payer: COMMERCIAL

## 2024-04-03 NOTE — TELEPHONE ENCOUNTER
April 3, 2024    Medicare RX Drug Coverage Determinatin was received via fax for Dr. Narayan to sign.  Patient label was attached to paperwork and placed in provider's inbox to be signed.    Anu Muir

## 2024-04-04 NOTE — TELEPHONE ENCOUNTER
April 4, 2024    Medicare rx drug coverage form  was picked up from outbox of Dr. Narayan.  Paperwork has been reviewed and is complete.  Per initial initial request, this was sent via fax to [phone].     Pam J. Behr

## 2024-04-24 ENCOUNTER — PATIENT OUTREACH (OUTPATIENT)
Dept: CARE COORDINATION | Facility: CLINIC | Age: 84
End: 2024-04-24
Payer: COMMERCIAL

## 2024-05-15 ENCOUNTER — TRANSFERRED RECORDS (OUTPATIENT)
Dept: MULTI SPECIALTY CLINIC | Facility: CLINIC | Age: 84
End: 2024-05-15

## 2024-05-15 LAB — RETINOPATHY: NORMAL

## 2024-05-20 ENCOUNTER — ALLIED HEALTH/NURSE VISIT (OUTPATIENT)
Dept: EDUCATION SERVICES | Facility: CLINIC | Age: 84
End: 2024-05-20
Payer: COMMERCIAL

## 2024-05-20 ENCOUNTER — LAB (OUTPATIENT)
Dept: LAB | Facility: CLINIC | Age: 84
End: 2024-05-20
Payer: COMMERCIAL

## 2024-05-20 VITALS — WEIGHT: 195.4 LBS | BODY MASS INDEX: 31.54 KG/M2

## 2024-05-20 DIAGNOSIS — E11.9 TYPE 2 DIABETES, HBA1C GOAL < 8% (H): ICD-10-CM

## 2024-05-20 DIAGNOSIS — E11.9 TYPE 2 DIABETES, HBA1C GOAL < 8% (H): Primary | ICD-10-CM

## 2024-05-20 LAB — HBA1C MFR BLD: 6.5 % (ref 0–5.6)

## 2024-05-20 PROCEDURE — G0108 DIAB MANAGE TRN  PER INDIV: HCPCS

## 2024-05-20 PROCEDURE — 83036 HEMOGLOBIN GLYCOSYLATED A1C: CPT

## 2024-05-20 PROCEDURE — 36415 COLL VENOUS BLD VENIPUNCTURE: CPT

## 2024-05-20 PROCEDURE — 99207 PR NO CHARGE NURSE ONLY: CPT

## 2024-05-20 NOTE — PROGRESS NOTES
Diabetes Self-Management Education & Support    Presents for: Individual review    Type of Service: In Person Visit      ASSESSMENT:  Kelley is a very pleasant 83-year-old who returns to clinic today to review blood glucose, medications, recheck A1c and to assess/assist her with her current diabetes self-management skills where and if needed.  She arrived today unaccompanied and had her CGM reader with her.  Medications were reviewed and Kelley confirms she is currently taking 35 units of Lantus SC daily and Novolog with meals.  She will administer anywhere between 30 and 40 units of Novolog depending on what her blood glucose is before eating and the amount of carbohydrates she will be having.  Kelley had previously been on Mounjaro but due to supply issues was unable to get it, then she went on a cruise, and now she will try to see if [facility] has it in stock again.  Prior to her appointment today we did recheck her A1c and was pleased to see it came back only slightly higher at 6.5% from a previous 6.2%.  Kelley just returned from a cruise to [place] last evening-she went with her brother and fully admits that her food choices and intake were often not very blood glucose friendly.  Motivated and willing to get back on track again.  Kelley is a retired  and lives independently in a house.  I will follow-up with her again in clinic when her next A1c is due and instructed her to call with any questions or concerns that come up before that time.      Patient's most recent   Lab Results   Component Value Date    A1C 6.5 05/20/2024     is meeting goal of <8.0    Diabetes knowledge and skills assessment:   Patient is knowledgeable in diabetes management concepts related to: Healthy Eating, Monitoring, Taking Medication, Reducing Risks, and Healthy Coping    Continue education with the following diabetes management concepts: Healthy Eating, Being Active, Problem Solving, and Reducing Risks    Based on learning  "assessment above, most appropriate setting for further diabetes education would be: Individual setting.      PLAN    See Patient Instructions for co-developed, patient-stated behavior change goals.  AVS printed and provided to patient today. See Follow-Up section for recommended follow-up.       Topics to cover at upcoming visits: Healthy Eating, Being Active, Taking Medication, and Reducing Risks    Follow-up: 9/9/24    See Care Plan for co-developed, patient-state behavior change goals.  AVS provided for patient today.    Education Materials Provided:  No new materials provided today      SUBJECTIVE/OBJECTIVE:  Presents for: Individual review  Accompanied by: Self  Diabetes education in the past 24 mo: Yes (LV 12/11/23)  Focus of Visit: Assistance w/ making life changes, Self-care behavioral goal setting, Taking Medication, Healthy Eating, Being Active, Reducing Risks  Diabetes type: Type 2  Date of diagnosis: > 10 yrs  Disease course: Stable  Transportation concerns: Yes  Other concerns:: None  Cultural Influences/Ethnic Background:  Not  or       Diabetes Symptoms & Complications:  Weight trend: Stable  Symptom course: Stable  Disease course: Stable  Complications assessed today?: No    Patient Problem List and Family Medical History reviewed for relevant medical history, current medical status, and diabetes risk factors.    Vitals:  Wt 88.6 kg (195 lb 6.4 oz)   LMP  (LMP Unknown)   BMI 31.54 kg/m    Estimated body mass index is 31.54 kg/m  as calculated from the following:    Height as of 7/20/23: 1.676 m (5' 6\").    Weight as of this encounter: 88.6 kg (195 lb 6.4 oz).   Last 3 BP:   BP Readings from Last 3 Encounters:   07/20/23 138/65   12/19/22 120/62   08/31/22 104/60       History   Smoking Status    Never   Smokeless Tobacco    Never       Labs:  Lab Results   Component Value Date    A1C 6.5 05/20/2024     Lab Results   Component Value Date     07/20/2023     12/10/2021 " "    Lab Results   Component Value Date    LDL 54 12/07/2020    LDL 82 10/14/2019     Direct Measure HDL   Date Value Ref Range Status   12/07/2020 39 (L) >=50 mg/dL Final   ]  GFR Estimate   Date Value Ref Range Status   07/20/2023 72 >60 mL/min/1.73m2 Final   12/07/2020 50 (L) >60 mL/min/1.73m2 Final     GFR Estimate If Black   Date Value Ref Range Status   12/07/2020 60 (L) >60 mL/min/1.73m2 Final     Lab Results   Component Value Date    CR 0.82 07/20/2023     No results found for: \"MICROALBUMIN\"    Healthy Eating:  Healthy Eating Assessed Today: No (patient just returned from a 7 day cruise on 5/19)  Cultural/Confucianism diet restrictions?: No  Do you have any food allergies or intolerances?: No  Meal planning/habits: Smaller portions, Low carb  Who cooks/prepares meals for you?: Self  Who purchases food in  your home?: Self  How many times a week on average do you eat food made away from home (restaurant/take-out)?: 0  Meals include: Breakfast, Lunch, Dinner  Beverages: Water, Coffee, Tea, Milk (crystal light / IT)  Has patient met with a dietitian in the past?: No    Being Active:  Being Active Assessed Today: Yes  Exercise:: Currently not exercising  Barrier to exercise: None, Physical limitation, Safety (knee- torn cartilage/ arthritis and hip arthritis)    Monitoring:  Monitoring Assessed Today: Yes  Did patient bring glucose meter to appointment? : Yes (Liane reader)  Blood Glucose Meter: CGM (reports several problems with sensors - has had to replace them > 6 times)  Times checking blood sugar at home (number): 5+  Times checking blood sugar at home (per): Day  Blood glucose trend: Fluctuating (stable)    Blood glucose data was reviewed and discussed with Kelley during her visit today.  Being that she had been on a cruise just prior to today's appointment it was somewhat difficult to establish patterns or consistencies.  We surmised the lows she had were the result of overestimating the amount of " carbohydrates she was going to eat and the extreme elevations were underestimating the amount of carbs she was going to eat.  Now that she is back home she is focused and engaged to get back on track.    Taking Medications:  Diabetes Medication(s)       Insulin       insulin aspart (NOVOLOG VIAL) 100 UNITS/ML vial Inject 50 Units Subcutaneous 3 times daily (with meals)     LANTUS VIAL 100 UNIT/ML soln Inject 47 Units Subcutaneous At Bedtime       Incretin Mimetic Agents       tirzepatide (MOUNJARO) 2.5 MG/0.5ML pen Inject 2.5 mg Subcutaneous every 7 days     Patient not taking: Reported on 5/20/2024     tirzepatide (MOUNJARO) 5 MG/0.5ML pen Inject 5 mg Subcutaneous once a week     Patient not taking: Reported on 5/20/2024            Taking Medication Assessed Today: Yes  Current Treatments: Insulin Injections  Dose schedule: Pre-breakfast, Pre-lunch, Pre-dinner, At bedtime  Given by: Patient  Injection/Infusion sites: Abdomen  Problems taking diabetes medications regularly?: No  Diabetes medication side effects?: No    Problem Solving:  Problem Solving Assessed Today: Yes  Is the patient at risk for hypoglycemia?: Yes  Hypoglycemia Frequency: Weekly (alarm sounds when BG reaches 65 - pt corrects on that)  Hypoglycemia Treatment: Juice, Other food, Candy  Does patient have glucagon emergency kit?: No    Hypoglycemia Symptoms  Hypoglycemia: None (alarms sound before she gets symptomatic)         Reducing Risks:  Reducing Risks Assessed Today: Yes  Diabetes Risks: Age over 45 years, Sedentary Lifestyle, Hyperlipidemia  CAD Risks: Sedentary lifestyle, Post-menopausal, Diabetes Mellitus, Family history, Dyslipidemia  Has dilated eye exam at least once a year?: Yes  Feet checked by healthcare provider in the last year?: Yes    Healthy Coping:  Healthy Coping Assessed Today: Yes  Emotional response to diabetes: Ready to learn  Informal Support system:: Family, Friends  Support resources: In-person Offerings  Patient  Activation Measure Survey Score:       No data to display                  Care Plan and Education Provided:  Healthy Eating: Balanced meals, Consistency in amount and timing of carbohydrate intake, Eating out, and Portion control, Being Active: Finding a physical activity routine that works for you, Precautions to take with exercise, and Relationship of activity to glucose, Monitoring: Frequency of monitoring and Individual glucose targets, Taking Medication: Proper site selection and rotation for injections, Side effects of prescribed medication(s), and When to take medication(s), Problem Solving: High glucose - causes, signs/symptoms, treatment and prevention and Low glucose - causes, signs/symptoms, treatment and prevention, Reducing Risks: Eye care, Goal for A1c, how it relates to glucose and how often to check, Preventing cardiovascular disease, including blood pressure goals, lipid goals, recommendations for cardioprotective medications, statins, and aspirin, Prevention, early diagnostic measures and treatment of complications, and A1C - goals, relating to blood glucose levels, how often to check, and Healthy Coping: Benefits of making appropriate lifestyle changes, Identifying helpful resources, and Utilize support systems    Thank you,  Sandrine Vu RN [facility]  Certified Diabetes Care and   Visit type : DSMT      Time Spent: 30 minutes  Encounter Type: Individual    Any diabetes medication dose changes were made via the CDE Protocol per the patient's referring provider and primary care provider. A copy of this encounter was shared with the provider.   Much or all of the text in this note was generated through the use of the Dragon Dictate voice-to-text software.Errors in spelling or words which seem out of context are unintentional. Sound alike errors, in particular, may have escaped editing.

## 2024-05-20 NOTE — LETTER
5/20/2024         RE: Kelley Styles  [address] [id number]        Dear Colleague,    Thank you for referring your patient, Kelley Styles, to the [facility]. Please see a copy of my visit note below.    Diabetes Self-Management Education & Support    Presents for: Individual review    Type of Service: In Person Visit      ASSESSMENT:  Kelley is a very pleasant 83-year-old who returns to clinic today to review blood glucose, medications, recheck A1c and to assess/assist her with her current diabetes self-management skills where and if needed.  She arrived today unaccompanied and had her CGM reader with her.  Medications were reviewed and Kelley confirms she is currently taking 35 units of Lantus SC daily and Novolog with meals.  She will administer anywhere between 30 and 40 units of Novolog depending on what her blood glucose is before eating and the amount of carbohydrates she will be having.  Kelley had previously been on Mounjaro but due to supply issues was unable to get it, then she went on a cruise, and now she will try to see if [employer] has it in stock again.  Prior to her appointment today we did recheck her A1c and was pleased to see it came back only slightly higher at 6.5% from a previous 6.2%.  Kelley just returned from a cruise to [place] last evening-she went with her brother and fully admits that her food choices and intake were often not very blood glucose friendly.  Motivated and willing to get back on track again.  Kelley is a retired  and lives independently in a house.  I will follow-up with her again in clinic when her next A1c is due and instructed her to call with any questions or concerns that come up before that time.      Patient's most recent   Lab Results   Component Value Date    A1C 6.5 05/20/2024     is meeting goal of <8.0    Diabetes knowledge and skills assessment:   Patient is knowledgeable in diabetes management  "concepts related to: Healthy Eating, Monitoring, Taking Medication, Reducing Risks, and Healthy Coping    Continue education with the following diabetes management concepts: Healthy Eating, Being Active, Problem Solving, and Reducing Risks    Based on learning assessment above, most appropriate setting for further diabetes education would be: Individual setting.      PLAN    See Patient Instructions for co-developed, patient-stated behavior change goals.  AVS printed and provided to patient today. See Follow-Up section for recommended follow-up.       Topics to cover at upcoming visits: Healthy Eating, Being Active, Taking Medication, and Reducing Risks    Follow-up: 9/9/24    See Care Plan for co-developed, patient-state behavior change goals.  AVS provided for patient today.    Education Materials Provided:  No new materials provided today      SUBJECTIVE/OBJECTIVE:  Presents for: Individual review  Accompanied by: Self  Diabetes education in the past 24 mo: Yes ([facility] 12/11/23)  Focus of Visit: Assistance w/ making life changes, Self-care behavioral goal setting, Taking Medication, Healthy Eating, Being Active, Reducing Risks  Diabetes type: Type 2  Date of diagnosis: > 10 yrs  Disease course: Stable  Transportation concerns: Yes  Other concerns:: None  Cultural Influences/Ethnic Background:  Not  or       Diabetes Symptoms & Complications:  Weight trend: Stable  Symptom course: Stable  Disease course: Stable  Complications assessed today?: No    Patient Problem List and Family Medical History reviewed for relevant medical history, current medical status, and diabetes risk factors.    Vitals:  Wt 88.6 kg (195 lb 6.4 oz)   LMP  (LMP Unknown)   BMI 31.54 kg/m    Estimated body mass index is 31.54 kg/m  as calculated from the following:    Height as of 7/20/23: 1.676 m (5' 6\").    Weight as of this encounter: 88.6 kg (195 lb 6.4 oz).   Last 3 BP:   BP Readings from Last 3 Encounters:   07/20/23 138/65 " "  12/19/22 120/62   08/31/22 104/60       History   Smoking Status     Never   Smokeless Tobacco     Never       Labs:  Lab Results   Component Value Date    A1C 6.5 05/20/2024     Lab Results   Component Value Date     07/20/2023     12/10/2021     Lab Results   Component Value Date    LDL 54 12/07/2020    LDL 82 10/14/2019     Direct Measure HDL   Date Value Ref Range Status   12/07/2020 39 (L) >=50 mg/dL Final   ]  GFR Estimate   Date Value Ref Range Status   07/20/2023 72 >60 mL/min/1.73m2 Final   12/07/2020 50 (L) >60 mL/min/1.73m2 Final     GFR Estimate If Black   Date Value Ref Range Status   12/07/2020 60 (L) >60 mL/min/1.73m2 Final     Lab Results   Component Value Date    CR 0.82 07/20/2023     No results found for: \"MICROALBUMIN\"    Healthy Eating:  Healthy Eating Assessed Today: No (patient just returned from a 7 day cruise on 5/19)  Cultural/Voodoo diet restrictions?: No  Do you have any food allergies or intolerances?: No  Meal planning/habits: Smaller portions, Low carb  Who cooks/prepares meals for you?: Self  Who purchases food in  your home?: Self  How many times a week on average do you eat food made away from home (restaurant/take-out)?: 0  Meals include: Breakfast, Lunch, Dinner  Beverages: Water, Coffee, Tea, Milk (crystal light / IT)  Has patient met with a dietitian in the past?: No    Being Active:  Being Active Assessed Today: Yes  Exercise:: Currently not exercising  Barrier to exercise: None, Physical limitation, Safety (knee- torn cartilage/ arthritis and hip arthritis)    Monitoring:  Monitoring Assessed Today: Yes  Did patient bring glucose meter to appointment? : Yes (Liane reader)  Blood Glucose Meter: CGM (reports several problems with sensors - has had to replace them > 6 times)  Times checking blood sugar at home (number): 5+  Times checking blood sugar at home (per): Day  Blood glucose trend: Fluctuating (stable)    Blood glucose data was reviewed and discussed " with Kelley during her visit today.  Being that she had been on a cruise just prior to today's appointment it was somewhat difficult to establish patterns or consistencies.  We surmised the lows she had were the result of overestimating the amount of carbohydrates she was going to eat and the extreme elevations were underestimating the amount of carbs she was going to eat.  Now that she is back home she is focused and engaged to get back on track.    Taking Medications:  Diabetes Medication(s)       Insulin       insulin aspart (NOVOLOG VIAL) 100 UNITS/ML vial Inject 50 Units Subcutaneous 3 times daily (with meals)     LANTUS VIAL 100 UNIT/ML soln Inject 47 Units Subcutaneous At Bedtime       Incretin Mimetic Agents       tirzepatide (MOUNJARO) 2.5 MG/0.5ML pen Inject 2.5 mg Subcutaneous every 7 days     Patient not taking: Reported on 5/20/2024     tirzepatide (MOUNJARO) 5 MG/0.5ML pen Inject 5 mg Subcutaneous once a week     Patient not taking: Reported on 5/20/2024            Taking Medication Assessed Today: Yes  Current Treatments: Insulin Injections  Dose schedule: Pre-breakfast, Pre-lunch, Pre-dinner, At bedtime  Given by: Patient  Injection/Infusion sites: Abdomen  Problems taking diabetes medications regularly?: No  Diabetes medication side effects?: No    Problem Solving:  Problem Solving Assessed Today: Yes  Is the patient at risk for hypoglycemia?: Yes  Hypoglycemia Frequency: Weekly (alarm sounds when BG reaches 65 - pt corrects on that)  Hypoglycemia Treatment: Juice, Other food, Candy  Does patient have glucagon emergency kit?: No    Hypoglycemia Symptoms  Hypoglycemia: None (alarms sound before she gets symptomatic)         Reducing Risks:  Reducing Risks Assessed Today: Yes  Diabetes Risks: Age over 45 years, Sedentary Lifestyle, Hyperlipidemia  CAD Risks: Sedentary lifestyle, Post-menopausal, Diabetes Mellitus, Family history, Dyslipidemia  Has dilated eye exam at least once a year?: Yes  Feet  checked by healthcare provider in the last year?: Yes    Healthy Coping:  Healthy Coping Assessed Today: Yes  Emotional response to diabetes: Ready to learn  Informal Support system:: Family, Friends  Support resources: In-person Offerings  Patient Activation Measure Survey Score:       No data to display                  Care Plan and Education Provided:  Healthy Eating: Balanced meals, Consistency in amount and timing of carbohydrate intake, Eating out, and Portion control, Being Active: Finding a physical activity routine that works for you, Precautions to take with exercise, and Relationship of activity to glucose, Monitoring: Frequency of monitoring and Individual glucose targets, Taking Medication: Proper site selection and rotation for injections, Side effects of prescribed medication(s), and When to take medication(s), Problem Solving: High glucose - causes, signs/symptoms, treatment and prevention and Low glucose - causes, signs/symptoms, treatment and prevention, Reducing Risks: Eye care, Goal for A1c, how it relates to glucose and how often to check, Preventing cardiovascular disease, including blood pressure goals, lipid goals, recommendations for cardioprotective medications, statins, and aspirin, Prevention, early diagnostic measures and treatment of complications, and A1C - goals, relating to blood glucose levels, how often to check, and Healthy Coping: Benefits of making appropriate lifestyle changes, Identifying helpful resources, and Utilize support systems    Thank you,  Sandrine Vu RN CDCES  Certified Diabetes Care and   Visit type : DSMT      Time Spent: 30 minutes  Encounter Type: Individual    Any diabetes medication dose changes were made via the CDE Protocol per the patient's referring provider and primary care provider. A copy of this encounter was shared with the provider.   Much or all of the text in this note was generated through the use of the Dragon Dictate  voice-to-text software.Errors in spelling or words which seem out of context are unintentional. Sound alike errors, in particular, may have escaped editing.

## 2024-05-20 NOTE — PATIENT INSTRUCTIONS
1. Eat 3 balanced meals each day - Monitor carb intake and limit to 45-60 grams per meal  This would be equal to 3-4 choices ~  1 choice = 15 grams    Do not wait longer than 4-5 hours to eat something  Snacks limit to no more than 30 grams of carbohydrates or 2 choices  Make sure you include protein source with each meal and at bedtime - this has been shown to help with blood glucose elevations    2. Check blood sugars several  times each day   Fasting and before meal target is 80 - 150   2 hours after a meal target is < 180  remember to bring meter and log book to all appointments    3. Incorporate 30 minutes activity into each day - does not need to be all at one time & walking counts    4. Take diabetes medications as prescribed Continue Lantus 35 units once daily , Novolog with meals and call on the Mounjaro     Back to [place] - time to get back in the saddle and on track !!!       A1c today was 6.5%  YOLANDE NASSAR     Thank you for coming in to see me today !      I value your experience and would be very thankful for your time in providing feedback in our clinic survey.    You may receive an e-mail, text message or even something in the mail from [employer].  This is a survey to let us know how we are doing - the survey will be related to your diabetes education and me.

## 2024-05-22 ENCOUNTER — PATIENT OUTREACH (OUTPATIENT)
Dept: CARE COORDINATION | Facility: CLINIC | Age: 84
End: 2024-05-22
Payer: COMMERCIAL

## 2024-05-28 DIAGNOSIS — E11.9 TYPE 2 DIABETES, HBA1C GOAL < 8% (H): ICD-10-CM

## 2024-05-30 ENCOUNTER — TELEPHONE (OUTPATIENT)
Dept: INTERNAL MEDICINE | Facility: CLINIC | Age: 84
End: 2024-05-30

## 2024-05-30 NOTE — TELEPHONE ENCOUNTER
{Screenshot of Product, Insurance, and Cardholder ID)    I HAVE STARTED PA THROUGH CM. THANKS!    Please route determinations to the Pharm Diabetes pool ([id number]).      Thank you!    Diabetes Care Services Team   [facility]  [address] [place] [address]

## 2024-06-10 NOTE — TELEPHONE ENCOUNTER
Prior Authorization Not Needed per Insurance        Medication: Continuous Glucose Sensor (FREESTYLE EVERARDO 2 SENSOR) MISC -PA NOT NEEDED   Insurance Company: [employer] - Phone [phone] Fax [phone]  Expected CoPay:      [facility]illing the Rx[facility] - [place], [place] - [address]   Pharmacy Notified:  No  Patient Notified:  No      Insurance states that PA is Not Needed and medication is coved. PA Approval on File Until 6/2/2027. Filling pharmacy has a paid claim on medication on 6/5/2024 Quantity 6 for 84 days and patient has picked up medication as noted below.

## 2024-07-11 ENCOUNTER — PATIENT OUTREACH (OUTPATIENT)
Dept: CARE COORDINATION | Facility: CLINIC | Age: 84
End: 2024-07-11
Payer: COMMERCIAL

## 2024-07-17 DIAGNOSIS — E78.5 HYPERLIPIDEMIA LDL GOAL <100: ICD-10-CM

## 2024-07-17 DIAGNOSIS — E11.9 TYPE 2 DIABETES, HBA1C GOAL < 8% (H): Primary | ICD-10-CM

## 2024-07-31 ENCOUNTER — LAB (OUTPATIENT)
Dept: LAB | Facility: CLINIC | Age: 84
End: 2024-07-31
Payer: COMMERCIAL

## 2024-07-31 ENCOUNTER — OFFICE VISIT (OUTPATIENT)
Dept: INTERNAL MEDICINE | Facility: CLINIC | Age: 84
End: 2024-07-31
Payer: COMMERCIAL

## 2024-07-31 VITALS
DIASTOLIC BLOOD PRESSURE: 68 MMHG | TEMPERATURE: 97.2 F | SYSTOLIC BLOOD PRESSURE: 126 MMHG | RESPIRATION RATE: 10 BRPM | HEIGHT: 66 IN | WEIGHT: 201.6 LBS | HEART RATE: 69 BPM | BODY MASS INDEX: 32.4 KG/M2 | OXYGEN SATURATION: 98 %

## 2024-07-31 DIAGNOSIS — E11.9 TYPE 2 DIABETES, HBA1C GOAL < 8% (H): ICD-10-CM

## 2024-07-31 DIAGNOSIS — G47.9 SLEEP DISORDER: ICD-10-CM

## 2024-07-31 DIAGNOSIS — Z23 NEED FOR SHINGLES VACCINE: ICD-10-CM

## 2024-07-31 DIAGNOSIS — Z87.19 HISTORY OF DIVERTICULITIS: ICD-10-CM

## 2024-07-31 DIAGNOSIS — E55.9 VITAMIN D DEFICIENCY: Primary | ICD-10-CM

## 2024-07-31 DIAGNOSIS — E78.5 HYPERLIPIDEMIA LDL GOAL <100: ICD-10-CM

## 2024-07-31 DIAGNOSIS — Z78.0 MENOPAUSE: ICD-10-CM

## 2024-07-31 DIAGNOSIS — Z12.31 VISIT FOR SCREENING MAMMOGRAM: ICD-10-CM

## 2024-07-31 LAB
ALBUMIN SERPL BCG-MCNC: 4.3 G/DL (ref 3.5–5.2)
ALP SERPL-CCNC: 94 U/L (ref 40–150)
ALT SERPL W P-5'-P-CCNC: 24 U/L (ref 0–50)
ANION GAP SERPL CALCULATED.3IONS-SCNC: 9 MMOL/L (ref 7–15)
AST SERPL W P-5'-P-CCNC: 25 U/L (ref 0–45)
BILIRUB SERPL-MCNC: 0.4 MG/DL
BUN SERPL-MCNC: 16.4 MG/DL (ref 8–23)
CALCIUM SERPL-MCNC: 9.5 MG/DL (ref 8.8–10.4)
CHLORIDE SERPL-SCNC: 102 MMOL/L (ref 98–107)
CREAT SERPL-MCNC: 0.86 MG/DL (ref 0.51–0.95)
CREAT UR-MCNC: 18.2 MG/DL
EGFRCR SERPLBLD CKD-EPI 2021: 67 ML/MIN/1.73M2
ERYTHROCYTE [DISTWIDTH] IN BLOOD BY AUTOMATED COUNT: 12.1 % (ref 10–15)
GLUCOSE SERPL-MCNC: 185 MG/DL (ref 70–99)
HBA1C MFR BLD: 6.7 % (ref 0–5.6)
HCO3 SERPL-SCNC: 28 MMOL/L (ref 22–29)
HCT VFR BLD AUTO: 41.6 % (ref 35–47)
HGB BLD-MCNC: 13.7 G/DL (ref 11.7–15.7)
LDLC SERPL DIRECT ASSAY-MCNC: 58 MG/DL
MCH RBC QN AUTO: 30.2 PG (ref 26.5–33)
MCHC RBC AUTO-ENTMCNC: 32.9 G/DL (ref 31.5–36.5)
MCV RBC AUTO: 92 FL (ref 78–100)
MICROALBUMIN UR-MCNC: <12 MG/L
MICROALBUMIN/CREAT UR: NORMAL MG/G{CREAT}
PLATELET # BLD AUTO: 239 10E3/UL (ref 150–450)
POTASSIUM SERPL-SCNC: 4.8 MMOL/L (ref 3.4–5.3)
PROT SERPL-MCNC: 7.2 G/DL (ref 6.4–8.3)
RBC # BLD AUTO: 4.53 10E6/UL (ref 3.8–5.2)
SODIUM SERPL-SCNC: 139 MMOL/L (ref 135–145)
WBC # BLD AUTO: 6.8 10E3/UL (ref 4–11)

## 2024-07-31 PROCEDURE — G0439 PPPS, SUBSEQ VISIT: HCPCS | Performed by: INTERNAL MEDICINE

## 2024-07-31 PROCEDURE — 80053 COMPREHEN METABOLIC PANEL: CPT

## 2024-07-31 PROCEDURE — 99214 OFFICE O/P EST MOD 30 MIN: CPT | Mod: 25 | Performed by: INTERNAL MEDICINE

## 2024-07-31 PROCEDURE — 82570 ASSAY OF URINE CREATININE: CPT

## 2024-07-31 PROCEDURE — 83036 HEMOGLOBIN GLYCOSYLATED A1C: CPT

## 2024-07-31 PROCEDURE — 36415 COLL VENOUS BLD VENIPUNCTURE: CPT

## 2024-07-31 PROCEDURE — 83721 ASSAY OF BLOOD LIPOPROTEIN: CPT

## 2024-07-31 PROCEDURE — 82043 UR ALBUMIN QUANTITATIVE: CPT

## 2024-07-31 PROCEDURE — 85027 COMPLETE CBC AUTOMATED: CPT

## 2024-07-31 RX ORDER — RAMELTEON 8 MG/1
8 TABLET ORAL AT BEDTIME
Qty: 30 TABLET | Refills: 11 | Status: SHIPPED | OUTPATIENT
Start: 2024-07-31 | End: 2024-08-01

## 2024-07-31 SDOH — HEALTH STABILITY: PHYSICAL HEALTH: ON AVERAGE, HOW MANY DAYS PER WEEK DO YOU ENGAGE IN MODERATE TO STRENUOUS EXERCISE (LIKE A BRISK WALK)?: 0 DAYS

## 2024-07-31 ASSESSMENT — SOCIAL DETERMINANTS OF HEALTH (SDOH): HOW OFTEN DO YOU GET TOGETHER WITH FRIENDS OR RELATIVES?: THREE TIMES A WEEK

## 2024-07-31 NOTE — PROGRESS NOTES
"Preventive Care Visit  [facility]  Ketan Narayan MD, Internal Medicine  Jul 31, 2024    Assessment & Plan     Vitamin D deficiency  Ongoing replacement    Visit for screening mammogram  She is comfortable discontinuing screening given her age, and history of benign testing    Type 2 diabetes, HbA1C goal < 8% (H)  A1c today is 6.7 She is not using tirzepatide as it has not been available.     Sleep disorder  She has early awakening, chronic.  Will try ramelteon.    - ramelteon (ROZEREM) 8 MG tablet  Dispense: 30 tablet; Refill: 11    R/o osteoporosis  - DEXA HIP/PELVIS/SPINE - Future    Back pain  No trauma or symptoms or radiculopathy.  Suspect DJD, she does not have time for xray. Will follow up if progresses or fails to improve.     BMI  Estimated body mass index is 32.44 kg/m  as calculated from the following:    Height as of this encounter: 1.679 m (5' 6.1\").    Weight as of this encounter: 91.4 kg (201 lb 9.6 oz).     Counseling  Appropriate preventive services were addressed with this patient.  Reviewed patient's diet, addressing concerns and/or questions.     Follow up in 6 months.     Wilner Benson is a 84 year old, presenting for the following:  Annual Visit        7/31/2024    10:43 AM   Additional Questions   Roomed by Miki ROLDAN RN     Via the Health Maintenance questionnaire, the patient has reported the following services have been completed -Eye Exam: [facility] in [place] 2024-05-15, this information has been sent to the abstraction team.  Via the Health Maintenance questionnaire, the patient has reported the following services have been completed -Mammogram: [facility] 2023-01-15, this information has not been sent to the abstraction team.  Health Care Directive  Patient does not have a Health Care Directive or Living Will: Discussed advance care planning with patient; information given to patient to review.    HPI  She has been OK. Not getting " tirzepatide as supplies don't allow. Using insulin and trying to watch her diet.  No new dyspnea or cough or bowel or bladder issues. Some back pain, positional without leg radiation or foot weakness or bladder symptoms.  No symptoms of hyperglycemia, or hypoglycemia. Has both wet and dry macular degeneration and is receiving eye care.         7/31/2024   General Health   How would you rate your overall physical health? (!) FAIR   Feel stress (tense, anxious, or unable to sleep) Only a little      (!) STRESS CONCERN      7/31/2024   Nutrition   Diet: Regular (no restrictions)    Diabetic       Multiple values from one day are sorted in reverse-chronological order         7/31/2024   Exercise   Days per week of moderate/strenous exercise 0 days      (!) EXERCISE CONCERN      7/31/2024   Social Factors   Frequency of gathering with friends or relatives Three times a week   Worry food won't last until get money to buy more No   Food not last or not have enough money for food? No   Do you have housing? (Housing is defined as stable permanent housing and does not include staying ouside in a car, in a tent, in an abandoned building, in an overnight shelter, or couch-surfing.) Yes   Are you worried about losing your housing? No   Lack of transportation? No   Unable to get utilities (heat,electricity)? No         7/31/2024   Fall Risk   Fallen 2 or more times in the past year? No    No   Trouble with walking or balance? Yes    Yes   Gait Speed Test (Document in seconds) 6.66   Gait Speed Test Interpretation Greater than 5.01 seconds - ABNORMAL       Multiple values from one day are sorted in reverse-chronological order          7/31/2024   Activities of Daily Living- Home Safety   Needs help with the following daily activites Transportation   Safety concerns in the home None of the above          7/31/2024   Dental   Dentist two times every year? Yes          7/31/2024   Hearing Screening   Hearing concerns? None of the  above          7/31/2024   Driving Risk Screening   Patient/family members have concerns about driving No         7/31/2024   General Alertness/Fatigue Screening   Have you been more tired than usual lately? No         7/31/2024   Urinary Incontinence Screening   Bothered by leaking urine in past 6 months No         7/31/2024   TB Screening   Were you born outside of the [place]? No      Today's PHQ-2 Score:       7/31/2024    10:48 AM   PHQ-2 ( 1999 Pfizer)   Q1: Little interest or pleasure in doing things 0   Q2: Feeling down, depressed or hopeless 0   PHQ-2 Score 0   Q1: Little interest or pleasure in doing things Not at all   Q2: Feeling down, depressed or hopeless Not at all   PHQ-2 Score 0         7/31/2024   Substance Use   Alcohol more than 3/day or more than 7/wk No   Do you have a current opioid prescription? No   How severe/bad is pain from 1 to 10? 6/10   Do you use any other substances recreationally? No     Social History     Tobacco Use    Smoking status: Never    Smokeless tobacco: Never    Tobacco comments:     pt states that 50yrs+, smoked for x 1 year only   Vaping Use    Vaping status: Never Used   Substance Use Topics    Alcohol use: No     Comment: Alcoholic Drinks/day: rare wine out for lunch    Drug use: No         5/24/2023   LAST FHS-7 RESULTS   1st degree relative breast or ovarian cancer No   Any relative bilateral breast cancer No   Any male have breast cancer No   Any ONE woman have BOTH breast AND ovarian cancer No   Any woman with breast cancer before 50yrs No   2 or more relatives with breast AND/OR ovarian cancer No   2 or more relatives with breast AND/OR bowel cancer No     Reviewed and updated as needed this visit by Provider    Past Medical History:   Diagnosis Date    Cataract     Diverticulitis 2004    Essential hypertension 12/19/2022    GERD (gastroesophageal reflux disease)     Glaucoma     History of colonic polyps 7/20/2023    Hyperlipidemia LDL goal <100 12/19/2022    Low  vitamin D level 08/24/2016    Macular degeneration     Metabolic syndrome     Osteopenia     Personal history of colonic polyps     adenomatous, removed 2015 colonoscopy    Primary osteoarthritis of both knees 7/20/2023    Subacute cough 12/19/2022    Type 2 diabetes, HbA1C goal < 8% (H) 12/19/2022     Past Surgical History:   Procedure Laterality Date    APPENDECTOMY      ARTHROSCOPY KNEE      CATARACT EXTRACTION Left 01/2017    OTHER SURGICAL HISTORY      thyroid biopsy    RELEASE CARPAL TUNNEL Left     RELEASE TRIGGER FINGER Left     TONSILLECTOMY      ZZC TOTAL HIP ARTHROPLASTY      Description: Total Hip Replacement;  Recorded: 03/29/2012;     Current providers sharing in care for this patient include:  Patient Care Team:  Ketan Narayan MD as PCP - General (Internal Medicine)  Crystal Ross, PharmD as Pharmacist (Pharmacist)  Sandrine Cornell RN as Registered Nurse (Diabetes Education)  Ketan Zepeda MD    The following health maintenance items are reviewed in Epic and correct as of today:  Health Maintenance   Topic Date Due    DEXA  Never done    DIABETIC FOOT EXAM  Never done    ANNUAL REVIEW OF HM ORDERS  Never done    ZOSTER IMMUNIZATION (1 of 2) Never done    LIPID  12/07/2021    COVID-19 Vaccine (7 - 2023-24 season) 09/01/2023    MAMMO SCREENING  05/24/2024    EYE EXAM  06/26/2024    BMP  07/20/2024    MICROALBUMIN  07/20/2024    MEDICARE ANNUAL WELLNESS VISIT  07/20/2024    INFLUENZA VACCINE (1) 09/01/2024    A1C  01/31/2025    DTAP/TDAP/TD IMMUNIZATION (2 - Td or Tdap) 07/20/2025    FALL RISK ASSESSMENT  07/31/2025    COLORECTAL CANCER SCREENING  09/12/2026    ADVANCE CARE PLANNING  07/20/2028    PHQ-2 (once per calendar year)  Completed    Pneumococcal Vaccine: 65+ Years  Completed    RSV VACCINE (Pregnancy & 60+)  Completed    IPV IMMUNIZATION  Aged Out    HPV IMMUNIZATION  Aged Out    MENINGITIS IMMUNIZATION  Aged Out    RSV MONOCLONAL ANTIBODY  Aged Out     Review of Systems  See  "HPI    Objective      PHYSICAL EXAM:  General Appearance: In no acute distress  Vitals:    07/31/24 1056   BP: 126/68   BP Location: Right arm   Patient Position: Sitting   Cuff Size: Adult Regular   Pulse: 69   Resp: 10   Temp: 97.2  F (36.2  C)   TempSrc: Tympanic   SpO2: 98%   Weight: 91.4 kg (201 lb 9.6 oz)   Height: 1.679 m (5' 6.1\")     Estimated body mass index is 32.44 kg/m  as calculated from the following:    Height as of this encounter: 1.679 m (5' 6.1\").    Weight as of this encounter: 91.4 kg (201 lb 9.6 oz).  EYES: Clear   RESPIRATORY: Clear   CARDIOVASCULAR: S1, S2  EXTREMITIES:  mild weed carmelo laceration right lateral lower leg, dressed with bacitracin ointment.   NEUROLOGIC: Speech is clear, no focal arm or leg weakness, gait is normal for age.   PSYCHIATRIC: Oriented X 3,  thinking is clear         7/31/2024   Mini Cog   Clock Draw Score 2 Normal   3 Item Recall 3 objects recalled   Mini Cog Total Score 5      Cognitive function is intact today.     Signed Electronically by: Ketan Narayan MD  "

## 2024-08-01 DIAGNOSIS — G47.9 SLEEP DISORDER: ICD-10-CM

## 2024-08-01 RX ORDER — RAMELTEON 8 MG/1
8 TABLET ORAL AT BEDTIME
Qty: 90 TABLET | Refills: 3 | Status: SHIPPED | OUTPATIENT
Start: 2024-08-01

## 2024-08-01 NOTE — TELEPHONE ENCOUNTER
Pharmacy is requesting 90 day with 3 refills per insurance insurance.    Please update and send to pharmacy if appropriate.

## 2024-08-07 ENCOUNTER — TRANSFERRED RECORDS (OUTPATIENT)
Dept: HEALTH INFORMATION MANAGEMENT | Facility: CLINIC | Age: 84
End: 2024-08-07
Payer: COMMERCIAL

## 2024-08-10 ENCOUNTER — HEALTH MAINTENANCE LETTER (OUTPATIENT)
Age: 84
End: 2024-08-10

## 2024-08-13 ENCOUNTER — ANCILLARY PROCEDURE (OUTPATIENT)
Dept: BONE DENSITY | Facility: CLINIC | Age: 84
End: 2024-08-13
Attending: INTERNAL MEDICINE
Payer: COMMERCIAL

## 2024-08-13 DIAGNOSIS — Z78.0 MENOPAUSE: ICD-10-CM

## 2024-08-13 PROCEDURE — 77081 DXA BONE DENSITY APPENDICULR: CPT | Mod: TC | Performed by: STUDENT IN AN ORGANIZED HEALTH CARE EDUCATION/TRAINING PROGRAM

## 2024-08-13 PROCEDURE — 77080 DXA BONE DENSITY AXIAL: CPT | Mod: TC | Performed by: STUDENT IN AN ORGANIZED HEALTH CARE EDUCATION/TRAINING PROGRAM

## 2024-08-13 PROCEDURE — 77089 TBS DXA CAL W/I&R FX RISK: CPT

## 2024-08-21 DIAGNOSIS — I10 ESSENTIAL HYPERTENSION: ICD-10-CM

## 2024-08-21 RX ORDER — LISINOPRIL/HYDROCHLOROTHIAZIDE 10-12.5 MG
TABLET ORAL
Qty: 90 TABLET | Refills: 3 | Status: SHIPPED | OUTPATIENT
Start: 2024-08-21

## 2024-09-09 ENCOUNTER — ALLIED HEALTH/NURSE VISIT (OUTPATIENT)
Dept: EDUCATION SERVICES | Facility: CLINIC | Age: 84
End: 2024-09-09
Payer: COMMERCIAL

## 2024-09-09 VITALS — WEIGHT: 196.2 LBS | BODY MASS INDEX: 31.57 KG/M2

## 2024-09-09 DIAGNOSIS — E11.9 TYPE 2 DIABETES, HBA1C GOAL < 8% (H): Primary | ICD-10-CM

## 2024-09-09 PROCEDURE — G0108 DIAB MANAGE TRN  PER INDIV: HCPCS

## 2024-09-09 PROCEDURE — 99207 PR NO CHARGE NURSE ONLY: CPT

## 2024-09-09 NOTE — PATIENT INSTRUCTIONS
1. Eat 3 balanced meals each day - Monitor carb intake and limit to 45-60 grams per meal  This would be equal to 3-4 choices ~  1 choice = 15 grams    Do not wait longer than 4-5 hours to eat something  Snacks limit to no more than 30 grams of carbohydrates or 2 choices  Make sure you include protein source with each meal and at bedtime - this has been shown to help with blood glucose elevations    2. Check blood sugars several  times each day   Fasting and before meal target is 80 - 130   2 hours after a meal target is < 180  remember to bring meter and log book to all appointments    3. Incorporate 30 minutes activity into each day - does not need to be all at one time & walking counts    4. Take diabetes medications as prescribed Continue Lantus and Novolog with meals. We will keep you on 5 mg Mounjaro for now       Thank you for coming in to see me today !      I value your experience and would be very thankful for your time in providing feedback in our clinic survey.    You may receive an e-mail, text message or even something in the mail from [employer].  This is a survey to let us know how we are doing - the survey will be related to your diabetes education and me.

## 2024-09-09 NOTE — PROGRESS NOTES
Diabetes Self-Management Education & Support    Presents for: Individual review    Type of Service: In Person Visit      ASSESSMENT:  Kelley is a very pleasant 84-year-old who returns to clinic today to review blood glucose, medications, and to assess/assist her with her current diabetes self-management skills.  She arrived today unaccompanied and had her personal CGM reader with her.  Medications were reviewed and she confirms she is currently taking 35 units of Lantus daily, Novolog with meals and 5 mg of Mounjaro weekly.  Had been no missed or skipped doses of any of these medications.  Her blood glucose data was downloaded, reviewed and discussed with her today.  Based upon the information that was reviewed there were no medication adjustments made.  Her A1c done on 7/31/2024 is still meeting ADA goal and I congratulated her on this.  Activity has been somewhat limited as she finds it difficult to walk more than 1 block due to the pain in her left knee.  Since starting the Mounjaro up again, she has noticed a significant decrease in her appetite and cravings for junk food.  Overall she continues to do well we agreed to meet again in 3 months when her next A1c is due and I instructed her to call with any questions or concerns that come up before that time.    Patient's most recent   Lab Results   Component Value Date    A1C 6.7 07/31/2024     is meeting goal of <8.0    Diabetes knowledge and skills assessment:   Patient is knowledgeable in diabetes management concepts related to: Healthy Eating, Monitoring, Taking Medication, and Reducing Risks    Continue education with the following diabetes management concepts: Healthy Eating, Taking Medication, Problem Solving, and Healthy Coping    Based on learning assessment above, most appropriate setting for further diabetes education would be: Individual setting.      PLAN    See Patient Instructions for co-developed, patient-stated behavior change goals.  AVS printed  "and provided to patient today. See Follow-Up section for recommended follow-up.       Topics to cover at upcoming visits: Healthy Eating, Taking Medication, Problem Solving, Reducing Risks, and Healthy Coping    Follow-up: 12/2/24    See Care Plan for co-developed, patient-state behavior change goals.  AVS provided for patient today.    Education Materials Provided:  No new materials provided today      SUBJECTIVE/OBJECTIVE:  Presents for: Individual review  Accompanied by: Self  Diabetes education in the past 24 mo: Yes (LV 5/20/24)  Focus of Visit: Assistance w/ making life changes, Self-care behavioral goal setting, Taking Medication, Healthy Eating, Being Active, Reducing Risks  Diabetes type: Type 2  Date of diagnosis: > 10 yrs  Disease course: Stable  Transportation concerns: Yes  Difficulty affording diabetes medication?: Sometimes  Other concerns:: None  Cultural Influences/Ethnic Background:  Not  or       Diabetes Symptoms & Complications:  Weight trend: Decreasing  Symptom course: Stable  Disease course: Stable  Complications assessed today?: No    Patient Problem List and Family Medical History reviewed for relevant medical history, current medical status, and diabetes risk factors.    Vitals:  Wt 89 kg (196 lb 3.2 oz)   LMP  (LMP Unknown)   BMI 31.57 kg/m    Estimated body mass index is 31.57 kg/m  as calculated from the following:    Height as of 7/31/24: 1.679 m (5' 6.1\").    Weight as of this encounter: 89 kg (196 lb 3.2 oz).   Last 3 BP:   BP Readings from Last 3 Encounters:   07/31/24 126/68   07/20/23 138/65   12/19/22 120/62       History   Smoking Status    Never   Smokeless Tobacco    Never       Labs:  Lab Results   Component Value Date    A1C 6.7 07/31/2024     Lab Results   Component Value Date     07/31/2024     12/10/2021     Lab Results   Component Value Date    LDL 58 07/31/2024    LDL 54 12/07/2020    LDL 82 10/14/2019     Direct Measure HDL   Date Value " "Ref Range Status   12/07/2020 39 (L) >=50 mg/dL Final   ]  GFR Estimate   Date Value Ref Range Status   07/31/2024 67 >60 mL/min/1.73m2 Final     Comment:     eGFR calculated using 2021 CKD-EPI equation.   12/07/2020 50 (L) >60 mL/min/1.73m2 Final     GFR Estimate If Black   Date Value Ref Range Status   12/07/2020 60 (L) >60 mL/min/1.73m2 Final     Lab Results   Component Value Date    CR 0.86 07/31/2024     No results found for: \"MICROALBUMIN\"    Healthy Eating:  Healthy Eating Assessed Today: Yes (patient just returned from a 7 day cruise on 5/19)  Cultural/Hoahaoism diet restrictions?: No  Do you have any food allergies or intolerances?: No  Meal planning/habits: Smaller portions, Low carb  Who cooks/prepares meals for you?: Self  Who purchases food in  your home?: Self  How many times a week on average do you eat food made away from home (restaurant/take-out)?: 0  Meals include: Breakfast, Lunch, Dinner  Breakfast: today was 1/2 banana and 1/2 carton Ensure high protein  Lunch: usually not hungry, will have a snack  Dinner: last night had 1/2 c mac and cheese  Snacks: having a 1/2 carton Ensure, yogurt with a few grapes and brown sugar  Beverages: Water, Coffee, Tea, Milk (crystal light / IT)  Has patient met with a dietitian in the past?: No    Being Active:  Being Active Assessed Today: Yes  Exercise:: Currently not exercising  Barrier to exercise: None, Physical limitation, Safety (knee- torn cartilage/ arthritis and hip arthritis)    Monitoring:  Monitoring Assessed Today: Yes  Did patient bring glucose meter to appointment? : Yes (Liane reader)  Blood Glucose Meter: CGM (reports several problems with sensors - has had to replace them > 6 times)  Times checking blood sugar at home (number): 5+  Times checking blood sugar at home (per): Day  Blood glucose trend: Fluctuating (stable)    Blood glucose data was reviewed and discussed with Kelley during our visit today.  Overall, I informed her glucose numbers " looked relatively good.  She did have a few hypoglycemic episodes, and when we discussed this further, she said this usually occurs when she takes her insulin and then gets distracted or busy doing something else and forgets to eat.  Moving forward she said she will work harder on this.  I even informed her we could put reminder alarms on her reader if she so desired.    Taking Medications:  Diabetes Medication(s)       Insulin       LANTUS VIAL 100 UNIT/ML soln Inject 47 Units Subcutaneous At Bedtime     insulin aspart (NOVOLOG VIAL) 100 UNITS/ML vial Inject 50 Units Subcutaneous 3 times daily (with meals)       Incretin Mimetic Agents       tirzepatide (MOUNJARO) 5 MG/0.5ML pen Inject 5 mg Subcutaneous once a week            Taking Medication Assessed Today: Yes  Current Treatments: Insulin Injections, Non-insulin Injectables  Dose schedule: Pre-breakfast, Pre-lunch, Pre-dinner, At bedtime  Given by: Patient  Injection/Infusion sites: Abdomen  Problems taking diabetes medications regularly?: No  Diabetes medication side effects?: No    Problem Solving:  Problem Solving Assessed Today: Yes  Is the patient at risk for hypoglycemia?: Yes  Hypoglycemia Frequency: Weekly (alarm sounds when BG reaches 65 - pt corrects on that)  Hypoglycemia Treatment: Juice, Other food, Candy  Does patient have glucagon emergency kit?: No    Hypoglycemia Symptoms  Hypoglycemia: None (alarms sound before she gets symptomatic)    Hypoglycemia Complications  Hypoglycemia Complications: Nocturnal hypoglycemia    Reducing Risks:  Reducing Risks Assessed Today: Yes  Diabetes Risks: Age over 45 years, Sedentary Lifestyle, Hyperlipidemia  Additional female risks: PCOS  CAD Risks: Sedentary lifestyle, Post-menopausal, Diabetes Mellitus, Family history, Dyslipidemia  Has dilated eye exam at least once a year?: Yes  Feet checked by healthcare provider in the last year?: Yes    Healthy Coping:  Healthy Coping Assessed Today: Yes  Emotional response  to diabetes: Ready to learn  Informal Support system:: Family, Friends  Stage of change: ACTION (Actively working towards change)  Support resources: In-person Offerings  Patient Activation Measure Survey Score:       No data to display                  Care Plan and Education Provided:  Healthy Eating: Balanced meals, Consistency in amount and timing of carbohydrate intake, Heart healthy diet, Portion control, and Weight Management, Being Active: Finding a physical activity routine that works for you and Precautions to take with exercise, Taking Medication: Action of prescribed medication(s), Proper site selection and rotation for injections, and When to take medication(s), Reducing Risks: Eye care, Goal for A1c, how it relates to glucose and how often to check, Prevention, early diagnostic measures and treatment of complications, and A1C - goals, relating to blood glucose levels, how often to check, and Healthy Coping: Benefits of making appropriate lifestyle changes, Identifying helpful resources, and Utilize support systems    Thank you,  Sandrine Vu RN [facility]  Certified Diabetes Care and   Visit type : DSMT      Time Spent: 60 minutes  Encounter Type: Individual    Any diabetes medication dose changes were made via the CDE Protocol per the patient's referring provider and primary care provider. A copy of this encounter was shared with the provider.   Much or all of the text in this note was generated through the use of the Dragon Dictate voice-to-text software.Errors in spelling or words which seem out of context are unintentional. Sound alike errors, in particular, may have escaped editing.

## 2024-09-09 NOTE — LETTER
9/9/2024         RE: Kelley Styles  [address] [place] [address]        Dear Colleague,    Thank you for referring your patient, Kelley Styles, to the [facility]. Please see a copy of my visit note below.    Diabetes Self-Management Education & Support    Presents for: Individual review    Type of Service: In Person Visit      ASSESSMENT:  Kelley is a very pleasant 84-year-old who returns to clinic today to review blood glucose, medications, and to assess/assist her with her current diabetes self-management skills.  She arrived today unaccompanied and had her personal CGM reader with her.  Medications were reviewed and she confirms she is currently taking 35 units of Lantus daily, Novolog with meals and 5 mg of Mounjaro weekly.  Had been no missed or skipped doses of any of these medications.  Her blood glucose data was downloaded, reviewed and discussed with her today.  Based upon the information that was reviewed there were no medication adjustments made.  Her A1c done on 7/31/2024 is still meeting ADA goal and I congratulated her on this.  Activity has been somewhat limited as she finds it difficult to walk more than 1 block due to the pain in her left knee.  Since starting the Mounjaro up again, she has noticed a significant decrease in her appetite and cravings for junk food.  Overall she continues to do well we agreed to meet again in 3 months when her next A1c is due and I instructed her to call with any questions or concerns that come up before that time.    Patient's most recent   Lab Results   Component Value Date    A1C 6.7 07/31/2024     is meeting goal of <8.0    Diabetes knowledge and skills assessment:   Patient is knowledgeable in diabetes management concepts related to: Healthy Eating, Monitoring, Taking Medication, and Reducing Risks    Continue education with the following diabetes management concepts: Healthy Eating, Taking Medication,  "Problem Solving, and Healthy Coping    Based on learning assessment above, most appropriate setting for further diabetes education would be: Individual setting.      PLAN    See Patient Instructions for co-developed, patient-stated behavior change goals.  AVS printed and provided to patient today. See Follow-Up section for recommended follow-up.       Topics to cover at upcoming visits: Healthy Eating, Taking Medication, Problem Solving, Reducing Risks, and Healthy Coping    Follow-up: 12/2/24    See Care Plan for co-developed, patient-state behavior change goals.  AVS provided for patient today.    Education Materials Provided:  No new materials provided today      SUBJECTIVE/OBJECTIVE:  Presents for: Individual review  Accompanied by: Self  Diabetes education in the past 24 mo: Yes (LV 5/20/24)  Focus of Visit: Assistance w/ making life changes, Self-care behavioral goal setting, Taking Medication, Healthy Eating, Being Active, Reducing Risks  Diabetes type: Type 2  Date of diagnosis: > 10 yrs  Disease course: Stable  Transportation concerns: Yes  Difficulty affording diabetes medication?: Sometimes  Other concerns:: None  Cultural Influences/Ethnic Background:  Not  or       Diabetes Symptoms & Complications:  Weight trend: Decreasing  Symptom course: Stable  Disease course: Stable  Complications assessed today?: No    Patient Problem List and Family Medical History reviewed for relevant medical history, current medical status, and diabetes risk factors.    Vitals:  Wt 89 kg (196 lb 3.2 oz)   LMP  (LMP Unknown)   BMI 31.57 kg/m    Estimated body mass index is 31.57 kg/m  as calculated from the following:    Height as of 7/31/24: 1.679 m (5' 6.1\").    Weight as of this encounter: 89 kg (196 lb 3.2 oz).   Last 3 BP:   BP Readings from Last 3 Encounters:   07/31/24 126/68   07/20/23 138/65   12/19/22 120/62       History   Smoking Status     Never   Smokeless Tobacco     Never       Labs:  Lab " "Results   Component Value Date    A1C 6.7 07/31/2024     Lab Results   Component Value Date     07/31/2024     12/10/2021     Lab Results   Component Value Date    LDL 58 07/31/2024    LDL 54 12/07/2020    LDL 82 10/14/2019     Direct Measure HDL   Date Value Ref Range Status   12/07/2020 39 (L) >=50 mg/dL Final   ]  GFR Estimate   Date Value Ref Range Status   07/31/2024 67 >60 mL/min/1.73m2 Final     Comment:     eGFR calculated using 2021 CKD-EPI equation.   12/07/2020 50 (L) >60 mL/min/1.73m2 Final     GFR Estimate If Black   Date Value Ref Range Status   12/07/2020 60 (L) >60 mL/min/1.73m2 Final     Lab Results   Component Value Date    CR 0.86 07/31/2024     No results found for: \"MICROALBUMIN\"    Healthy Eating:  Healthy Eating Assessed Today: Yes (patient just returned from a 7 day cruise on 5/19)  Cultural/Congregational diet restrictions?: No  Do you have any food allergies or intolerances?: No  Meal planning/habits: Smaller portions, Low carb  Who cooks/prepares meals for you?: Self  Who purchases food in  your home?: Self  How many times a week on average do you eat food made away from home (restaurant/take-out)?: 0  Meals include: Breakfast, Lunch, Dinner  Breakfast: today was 1/2 banana and 1/2 carton Ensure high protein  Lunch: usually not hungry, will have a snack  Dinner: last night had 1/2 c mac and cheese  Snacks: having a 1/2 carton Ensure, yogurt with a few grapes and brown sugar  Beverages: Water, Coffee, Tea, Milk (crystal light / IT)  Has patient met with a dietitian in the past?: No    Being Active:  Being Active Assessed Today: Yes  Exercise:: Currently not exercising  Barrier to exercise: None, Physical limitation, Safety (knee- torn cartilage/ arthritis and hip arthritis)    Monitoring:  Monitoring Assessed Today: Yes  Did patient bring glucose meter to appointment? : Yes (Liane reader)  Blood Glucose Meter: CGM (reports several problems with sensors - has had to replace them > 6 " times)  Times checking blood sugar at home (number): 5+  Times checking blood sugar at home (per): Day  Blood glucose trend: Fluctuating (stable)    Blood glucose data was reviewed and discussed with Kelley during our visit today.  Overall, I informed her glucose numbers looked relatively good.  She did have a few hypoglycemic episodes, and when we discussed this further, she said this usually occurs when she takes her insulin and then gets distracted or busy doing something else and forgets to eat.  Moving forward she said she will work harder on this.  I even informed her we could put reminder alarms on her reader if she so desired.    Taking Medications:  Diabetes Medication(s)       Insulin       LANTUS VIAL 100 UNIT/ML soln Inject 47 Units Subcutaneous At Bedtime     insulin aspart (NOVOLOG VIAL) 100 UNITS/ML vial Inject 50 Units Subcutaneous 3 times daily (with meals)       Incretin Mimetic Agents       tirzepatide (MOUNJARO) 5 MG/0.5ML pen Inject 5 mg Subcutaneous once a week            Taking Medication Assessed Today: Yes  Current Treatments: Insulin Injections, Non-insulin Injectables  Dose schedule: Pre-breakfast, Pre-lunch, Pre-dinner, At bedtime  Given by: Patient  Injection/Infusion sites: Abdomen  Problems taking diabetes medications regularly?: No  Diabetes medication side effects?: No    Problem Solving:  Problem Solving Assessed Today: Yes  Is the patient at risk for hypoglycemia?: Yes  Hypoglycemia Frequency: Weekly (alarm sounds when BG reaches 65 - pt corrects on that)  Hypoglycemia Treatment: Juice, Other food, Candy  Does patient have glucagon emergency kit?: No    Hypoglycemia Symptoms  Hypoglycemia: None (alarms sound before she gets symptomatic)    Hypoglycemia Complications  Hypoglycemia Complications: Nocturnal hypoglycemia    Reducing Risks:  Reducing Risks Assessed Today: Yes  Diabetes Risks: Age over 45 years, Sedentary Lifestyle, Hyperlipidemia  Additional female risks: PCOS  CAD  Risks: Sedentary lifestyle, Post-menopausal, Diabetes Mellitus, Family history, Dyslipidemia  Has dilated eye exam at least once a year?: Yes  Feet checked by healthcare provider in the last year?: Yes    Healthy Coping:  Healthy Coping Assessed Today: Yes  Emotional response to diabetes: Ready to learn  Informal Support system:: Family, Friends  Stage of change: ACTION (Actively working towards change)  Support resources: In-person Offerings  Patient Activation Measure Survey Score:       No data to display                  Care Plan and Education Provided:  Healthy Eating: Balanced meals, Consistency in amount and timing of carbohydrate intake, Heart healthy diet, Portion control, and Weight Management, Being Active: Finding a physical activity routine that works for you and Precautions to take with exercise, Taking Medication: Action of prescribed medication(s), Proper site selection and rotation for injections, and When to take medication(s), Reducing Risks: Eye care, Goal for A1c, how it relates to glucose and how often to check, Prevention, early diagnostic measures and treatment of complications, and A1C - goals, relating to blood glucose levels, how often to check, and Healthy Coping: Benefits of making appropriate lifestyle changes, Identifying helpful resources, and Utilize support systems    Thank you,  Sandrine Vu RN [facility]  Certified Diabetes Care and   Visit type : DSMT      Time Spent: 60 minutes  Encounter Type: Individual    Any diabetes medication dose changes were made via the CDE Protocol per the patient's referring provider and primary care provider. A copy of this encounter was shared with the provider.   Much or all of the text in this note was generated through the use of the Dragon Dictate voice-to-text software.Errors in spelling or words which seem out of context are unintentional. Sound alike errors, in particular, may have escaped editing.

## 2024-09-10 ENCOUNTER — TRANSFERRED RECORDS (OUTPATIENT)
Dept: HEALTH INFORMATION MANAGEMENT | Facility: CLINIC | Age: 84
End: 2024-09-10
Payer: COMMERCIAL

## 2024-09-10 LAB — RETINOPATHY: NEGATIVE

## 2024-11-20 ENCOUNTER — PATIENT OUTREACH (OUTPATIENT)
Dept: CARE COORDINATION | Facility: CLINIC | Age: 84
End: 2024-11-20
Payer: COMMERCIAL

## 2024-11-21 ENCOUNTER — TRANSFERRED RECORDS (OUTPATIENT)
Dept: HEALTH INFORMATION MANAGEMENT | Facility: CLINIC | Age: 84
End: 2024-11-21
Payer: COMMERCIAL

## 2024-11-21 LAB — RETINOPATHY: NEGATIVE

## 2024-12-02 ENCOUNTER — LAB (OUTPATIENT)
Dept: LAB | Facility: CLINIC | Age: 84
End: 2024-12-02
Payer: COMMERCIAL

## 2024-12-02 ENCOUNTER — ALLIED HEALTH/NURSE VISIT (OUTPATIENT)
Dept: EDUCATION SERVICES | Facility: CLINIC | Age: 84
End: 2024-12-02
Payer: COMMERCIAL

## 2024-12-02 VITALS — WEIGHT: 184.2 LBS | BODY MASS INDEX: 29.64 KG/M2

## 2024-12-02 DIAGNOSIS — E11.9 TYPE 2 DIABETES, HBA1C GOAL < 8% (H): Primary | ICD-10-CM

## 2024-12-02 DIAGNOSIS — E11.9 TYPE 2 DIABETES, HBA1C GOAL < 8% (H): ICD-10-CM

## 2024-12-02 LAB
EST. AVERAGE GLUCOSE BLD GHB EST-MCNC: 128 MG/DL
HBA1C MFR BLD: 6.1 % (ref 0–5.6)

## 2024-12-02 PROCEDURE — 36415 COLL VENOUS BLD VENIPUNCTURE: CPT

## 2024-12-02 PROCEDURE — 99207 PR NO CHARGE NURSE ONLY: CPT

## 2024-12-02 PROCEDURE — 83036 HEMOGLOBIN GLYCOSYLATED A1C: CPT

## 2024-12-02 PROCEDURE — G0108 DIAB MANAGE TRN  PER INDIV: HCPCS

## 2024-12-02 NOTE — LETTER
12/2/2024         RE: Kelley Styles  [address] [place] [address]        Dear Colleague,    Thank you for referring your patient, Kelley Styles, to the [facility]. Please see a copy of my visit note below.    Diabetes Self-Management Education & Support    Presents for: Individual review    Type of Service: In Person Visit      ASSESSMENT:  Kelley is a very pleasant 84-year-old who returns to clinic today to review blood glucose, medications, recheck A1c, and to assess/assist her with her current diabetes self-management skills.  She arrived today unaccompanied and had her personal CGM ([employer]) reader with her.  Medications were reviewed and Kelley reports she is currently taking 25 to 30 units of Lantus each day at bedtime, 5 mg of Mounjaro weekly (she has been on this dose for approximately 1.5 months) and Novolog with meals.  Blood glucose data was downloaded reviewed and discussed with her today.  Overall her glycemic control continues to be optimal.  She did have a few hypoglycemic events, but those were deemed to be the result of missed calculating how much Novolog she needed with the meal she was eating or not eating all of her meal.  Prior to her visit today we did recheck her A1c and was very pleased to see it returned back at 6.1% from a previous 6.7%.  I congratulated her on this and continued her keep up the good work.  She had no questions or concerns today so we agreed to meet back up in clinic when her next A1c is due and I instructed her to call with any questions or concerns that come up before that time.    Patient's most recent   Lab Results   Component Value Date    A1C 6.1 12/02/2024     is meeting goal of <8.0    Diabetes knowledge and skills assessment:   Patient is knowledgeable in diabetes management concepts related to: Healthy Eating, Being Active, Monitoring, Taking Medication, Reducing Risks, and Healthy Coping    Continue education  "with the following diabetes management concepts: Healthy Eating, Being Active, and Reducing Risks    Based on learning assessment above, most appropriate setting for further diabetes education would be: Individual setting.      PLAN    See Patient Instructions for co-developed, patient-stated behavior change goals.  AVS printed and provided to patient today. See Follow-Up section for recommended follow-up.       Topics to cover at upcoming visits: Healthy Eating, Being Active, Reducing Risks, and Healthy Coping    Follow-up: 3/10/25    See Care Plan for co-developed, patient-state behavior change goals.  AVS provided for patient today.    Education Materials Provided:  No new materials provided today      SUBJECTIVE/OBJECTIVE:  Presents for: Individual review  Accompanied by: Self  Diabetes education in the past 24 mo: Yes (LV 9/9/24)  Focus of Visit: Assistance w/ making life changes, Self-care behavioral goal setting, Taking Medication, Healthy Eating, Being Active, Reducing Risks  Diabetes type: Type 2  Date of diagnosis: > 10 yrs  Disease course: Stable  Transportation concerns: Yes  Difficulty affording diabetes medication?: Sometimes  Other concerns:: None, Visual impairment  Cultural Influences/Ethnic Background:  Not  or       Diabetes Symptoms & Complications:  Weight trend: Decreasing  Symptom course: Stable  Disease course: Stable  Complications assessed today?: No    Patient Problem List and Family Medical History reviewed for relevant medical history, current medical status, and diabetes risk factors.    Vitals:  Wt 83.6 kg (184 lb 3.2 oz)   LMP  (LMP Unknown)   BMI 29.64 kg/m    Estimated body mass index is 29.64 kg/m  as calculated from the following:    Height as of 7/31/24: 1.679 m (5' 6.1\").    Weight as of this encounter: 83.6 kg (184 lb 3.2 oz).   Last 3 BP:   BP Readings from Last 3 Encounters:   07/31/24 126/68   07/20/23 138/65   12/19/22 120/62       History   Smoking Status " "    Never   Smokeless Tobacco     Never       Labs:  Lab Results   Component Value Date    A1C 6.1 12/02/2024     Lab Results   Component Value Date     07/31/2024     12/10/2021     Lab Results   Component Value Date    LDL 58 07/31/2024    LDL 54 12/07/2020    LDL 82 10/14/2019     Direct Measure HDL   Date Value Ref Range Status   12/07/2020 39 (L) >=50 mg/dL Final   ]  GFR Estimate   Date Value Ref Range Status   07/31/2024 67 >60 mL/min/1.73m2 Final     Comment:     eGFR calculated using 2021 CKD-EPI equation.   12/07/2020 50 (L) >60 mL/min/1.73m2 Final     GFR Estimate If Black   Date Value Ref Range Status   12/07/2020 60 (L) >60 mL/min/1.73m2 Final     Lab Results   Component Value Date    CR 0.86 07/31/2024     No results found for: \"MICROALBUMIN\"    Healthy Eating:  Healthy Eating Assessed Today: Yes  Cultural/Zoroastrianism diet restrictions?: No  Do you have any food allergies or intolerances?: No  Meal planning/habits: Smaller portions, Low carb (Kelley will often make 1 large portion of a meal-such as lasagna or chili and then portion it out and freeze it to eat at a later date.  She just cooked a 17 pound turkey for Thanksgiving and is using the leftover meat for various meals now)  Who cooks/prepares meals for you?: Self  Who purchases food in  your home?: Self  How many times a week on average do you eat food made away from home (restaurant/take-out)?: 0  Meals include: Breakfast, Lunch, Dinner  Breakfast: today was 1/2 banana and 1/2 carton Ensure high protein  Lunch: usually not hungry, will have a snack  Dinner: last night had a turkey sandwich  Snacks: having a 1/2 carton Ensure, yogurt with a few grapes and brown sugar  Beverages: Water, Coffee, Tea, Milk (crystal light / IT)  Has patient met with a dietitian in the past?: No    Being Active:  Being Active Assessed Today: Yes  Exercise:: Currently not exercising  Barrier to exercise: None, Physical limitation, Safety (knee- torn " cartilage/ arthritis and hip arthritis)    Monitoring:  Monitoring Assessed Today: Yes  Did patient bring glucose meter to appointment? : Yes (Liane reader)  Blood Glucose Meter: CGM (reports several problems with sensors - has had to replace them > 6 times)  Times checking blood sugar at home (number): 5+  Times checking blood sugar at home (per): Day  Blood glucose trend: Fluctuating (stable)        Taking Medications:  Diabetes Medication(s)       Insulin       insulin aspart (NOVOLOG VIAL) 100 UNITS/ML vial Inject 50 Units Subcutaneous 3 times daily (with meals)     LANTUS VIAL 100 UNIT/ML soln Inject 47 Units Subcutaneous At Bedtime       Incretin Mimetic Agents       tirzepatide (MOUNJARO) 5 MG/0.5ML pen Inject 5 mg Subcutaneous once a week            Taking Medication Assessed Today: Yes  Current Treatments: Insulin Injections, Non-insulin Injectables  Dose schedule: Pre-breakfast, Pre-lunch, Pre-dinner, At bedtime  Given by: Patient  Injection/Infusion sites: Abdomen  Problems taking diabetes medications regularly?: No  Diabetes medication side effects?: No    Problem Solving:  Problem Solving Assessed Today: Yes  Is the patient at risk for hypoglycemia?: Yes (When her blood glucose does go low, it is typically because she miscalculated how much Novolog she actually needed for that meal.)  Hypoglycemia Frequency: Weekly (alarm sounds when BG reaches 65 - pt corrects on that)  Hypoglycemia Treatment: Juice, Other food, Candy  Does patient have glucagon emergency kit?: No    Hypoglycemia Symptoms  Hypoglycemia: None (alarms sound before she gets symptomatic)    Hypoglycemia Complications  Hypoglycemia Complications: Nocturnal hypoglycemia    Reducing Risks:  Reducing Risks Assessed Today: Yes  Diabetes Risks: Age over 45 years, Sedentary Lifestyle, Hyperlipidemia  Additional female risks: PCOS  CAD Risks: Sedentary lifestyle, Post-menopausal, Diabetes Mellitus, Family history, Dyslipidemia  Has dilated eye  exam at least once a year?: Yes  Feet checked by healthcare provider in the last year?: Yes    Healthy Coping:  Healthy Coping Assessed Today: Yes  Emotional response to diabetes: Ready to learn  Informal Support system:: Family, Friends  Stage of change: ACTION (Actively working towards change)  Support resources: In-person Offerings  Patient Activation Measure Survey Score:       No data to display                  Care Plan and Education Provided:  Healthy Eating: Balanced meals, Consistency in amount and timing of carbohydrate intake, Eating out, and Portion control, Being Active: Amount recommended (150 minutes moderate or 75 minutes vigorous activity and 2-3 days strength training per week) and Finding a physical activity routine that works for you, Taking Medication: Proper site selection and rotation for injections, Reducing Risks: Eye care, Goal for A1c, how it relates to glucose and how often to check, Preventing cardiovascular disease, including blood pressure goals, lipid goals, recommendations for cardioprotective medications, statins, and aspirin, Prevention, early diagnostic measures and treatment of complications, and A1C - goals, relating to blood glucose levels, how often to check, and Healthy Coping: Benefits of making appropriate lifestyle changes, Identifying helpful resources, Recognize feelings about diagnosis, and Utilize support systems    Thank you,  Sandrine Vu RN [facility]  Certified Diabetes Care and   Visit type : DSMT      Time Spent: 30 minutes  Encounter Type: Individual    Any diabetes medication dose changes were made via the CDE Protocol per the patient's referring provider and primary care provider. A copy of this encounter was shared with the provider.   Much or all of the text in this note was generated through the use of the Dragon Dictate voice-to-text software.Errors in spelling or words which seem out of context are unintentional. Sound alike errors, in  particular, may have escaped editing.

## 2024-12-02 NOTE — PATIENT INSTRUCTIONS
1. Eat 3 balanced meals each day - Monitor carb intake and limit to 45-60 grams per meal  This would be equal to 3-4 choices ~  1 choice = 15 grams    Do not wait longer than 4-5 hours to eat something  Snacks limit to no more than 30 grams of carbohydrates or 2 choices  Make sure you include protein source with each meal and at bedtime - this has been shown to help with blood glucose elevations    2. Check blood sugars several  times each day   Fasting and before meal target is 80 - 150   2 hours after a meal target is < 180  remember to bring meter and log book to all appointments    3. Incorporate 30 minutes activity into each day - does not need to be all at one time & walking counts    4. Take diabetes medications as prescribed Continue Lantus each day 28 - 30 units, Novolog with your meals and  5 mg Mounjaro weekly    A1c today was 6.1% !!!! TONA BAEZ CONGRATS TO YOU !!!       Thank you for coming in to see me today !      I value your experience and would be very thankful for your time in providing feedback in our clinic survey.    You may receive an e-mail, text message or even something in the mail from [employer].  This is a survey to let us know how we are doing - the survey will be related to your diabetes education and me.

## 2024-12-02 NOTE — PROGRESS NOTES
Diabetes Self-Management Education & Support    Presents for: Individual review    Type of Service: In Person Visit      ASSESSMENT:  Kelley is a very pleasant 84-year-old who returns to clinic today to review blood glucose, medications, recheck A1c, and to assess/assist her with her current diabetes self-management skills.  She arrived today unaccompanied and had her personal CGM (wilber) reader with her.  Medications were reviewed and Kelley reports she is currently taking 25 to 30 units of Lantus each day at bedtime, 5 mg of Mounjaro weekly (she has been on this dose for approximately 1.5 months) and Novolog with meals.  Blood glucose data was downloaded reviewed and discussed with her today.  Overall her glycemic control continues to be optimal.  She did have a few hypoglycemic events, but those were deemed to be the result of missed calculating how much Novolog she needed with the meal she was eating or not eating all of her meal.  Prior to her visit today we did recheck her A1c and was very pleased to see it returned back at 6.1% from a previous 6.7%.  I congratulated her on this and continued her keep up the good work.  She had no questions or concerns today so we agreed to meet back up in clinic when her next A1c is due and I instructed her to call with any questions or concerns that come up before that time.    Patient's most recent   Lab Results   Component Value Date    A1C 6.1 12/02/2024     is meeting goal of <8.0    Diabetes knowledge and skills assessment:   Patient is knowledgeable in diabetes management concepts related to: Healthy Eating, Being Active, Monitoring, Taking Medication, Reducing Risks, and Healthy Coping    Continue education with the following diabetes management concepts: Healthy Eating, Being Active, and Reducing Risks    Based on learning assessment above, most appropriate setting for further diabetes education would be: Individual setting.      PLAN    See Patient Instructions for  "co-developed, patient-stated behavior change goals.  AVS printed and provided to patient today. See Follow-Up section for recommended follow-up.       Topics to cover at upcoming visits: Healthy Eating, Being Active, Reducing Risks, and Healthy Coping    Follow-up: 3/10/25    See Care Plan for co-developed, patient-state behavior change goals.  AVS provided for patient today.    Education Materials Provided:  No new materials provided today      SUBJECTIVE/OBJECTIVE:  Presents for: Individual review  Accompanied by: Self  Diabetes education in the past 24 mo: Yes (LV 9/9/24)  Focus of Visit: Assistance w/ making life changes, Self-care behavioral goal setting, Taking Medication, Healthy Eating, Being Active, Reducing Risks  Diabetes type: Type 2  Date of diagnosis: > 10 yrs  Disease course: Stable  Transportation concerns: Yes  Difficulty affording diabetes medication?: Sometimes  Other concerns:: None, Visual impairment  Cultural Influences/Ethnic Background:  Not  or       Diabetes Symptoms & Complications:  Weight trend: Decreasing  Symptom course: Stable  Disease course: Stable  Complications assessed today?: No    Patient Problem List and Family Medical History reviewed for relevant medical history, current medical status, and diabetes risk factors.    Vitals:  Wt 83.6 kg (184 lb 3.2 oz)   LMP  (LMP Unknown)   BMI 29.64 kg/m    Estimated body mass index is 29.64 kg/m  as calculated from the following:    Height as of 7/31/24: 1.679 m (5' 6.1\").    Weight as of this encounter: 83.6 kg (184 lb 3.2 oz).   Last 3 BP:   BP Readings from Last 3 Encounters:   07/31/24 126/68   07/20/23 138/65   12/19/22 120/62       History   Smoking Status    Never   Smokeless Tobacco    Never       Labs:  Lab Results   Component Value Date    A1C 6.1 12/02/2024     Lab Results   Component Value Date     07/31/2024     12/10/2021     Lab Results   Component Value Date    LDL 58 07/31/2024    LDL 54 " "12/07/2020    LDL 82 10/14/2019     Direct Measure HDL   Date Value Ref Range Status   12/07/2020 39 (L) >=50 mg/dL Final   ]  GFR Estimate   Date Value Ref Range Status   07/31/2024 67 >60 mL/min/1.73m2 Final     Comment:     eGFR calculated using 2021 CKD-EPI equation.   12/07/2020 50 (L) >60 mL/min/1.73m2 Final     GFR Estimate If Black   Date Value Ref Range Status   12/07/2020 60 (L) >60 mL/min/1.73m2 Final     Lab Results   Component Value Date    CR 0.86 07/31/2024     No results found for: \"MICROALBUMIN\"    Healthy Eating:  Healthy Eating Assessed Today: Yes  Cultural/Temple diet restrictions?: No  Do you have any food allergies or intolerances?: No  Meal planning/habits: Smaller portions, Low carb (Kelley will often make 1 large portion of a meal-such as lasagna or chili and then portion it out and freeze it to eat at a later date.  She just cooked a 17 pound turkey for Thanksgiving and is using the leftover meat for various meals now)  Who cooks/prepares meals for you?: Self  Who purchases food in  your home?: Self  How many times a week on average do you eat food made away from home (restaurant/take-out)?: 0  Meals include: Breakfast, Lunch, Dinner  Breakfast: today was 1/2 banana and 1/2 carton Ensure high protein  Lunch: usually not hungry, will have a snack  Dinner: last night had a turkey sandwich  Snacks: having a 1/2 carton Ensure, yogurt with a few grapes and brown sugar  Beverages: Water, Coffee, Tea, Milk (crystal light / IT)  Has patient met with a dietitian in the past?: No    Being Active:  Being Active Assessed Today: Yes  Exercise:: Currently not exercising  Barrier to exercise: None, Physical limitation, Safety (knee- torn cartilage/ arthritis and hip arthritis)    Monitoring:  Monitoring Assessed Today: Yes  Did patient bring glucose meter to appointment? : Yes (Liane reader)  Blood Glucose Meter: CGM (reports several problems with sensors - has had to replace them > 6 times)  Times " checking blood sugar at home (number): 5+  Times checking blood sugar at home (per): Day  Blood glucose trend: Fluctuating (stable)        Taking Medications:  Diabetes Medication(s)       Insulin       insulin aspart (NOVOLOG VIAL) 100 UNITS/ML vial Inject 50 Units Subcutaneous 3 times daily (with meals)     LANTUS VIAL 100 UNIT/ML soln Inject 47 Units Subcutaneous At Bedtime       Incretin Mimetic Agents       tirzepatide (MOUNJARO) 5 MG/0.5ML pen Inject 5 mg Subcutaneous once a week            Taking Medication Assessed Today: Yes  Current Treatments: Insulin Injections, Non-insulin Injectables  Dose schedule: Pre-breakfast, Pre-lunch, Pre-dinner, At bedtime  Given by: Patient  Injection/Infusion sites: Abdomen  Problems taking diabetes medications regularly?: No  Diabetes medication side effects?: No    Problem Solving:  Problem Solving Assessed Today: Yes  Is the patient at risk for hypoglycemia?: Yes (When her blood glucose does go low, it is typically because she miscalculated how much Novolog she actually needed for that meal.)  Hypoglycemia Frequency: Weekly (alarm sounds when BG reaches 65 - pt corrects on that)  Hypoglycemia Treatment: Juice, Other food, Candy  Does patient have glucagon emergency kit?: No    Hypoglycemia Symptoms  Hypoglycemia: None (alarms sound before she gets symptomatic)    Hypoglycemia Complications  Hypoglycemia Complications: Nocturnal hypoglycemia    Reducing Risks:  Reducing Risks Assessed Today: Yes  Diabetes Risks: Age over 45 years, Sedentary Lifestyle, Hyperlipidemia  Additional female risks: PCOS  CAD Risks: Sedentary lifestyle, Post-menopausal, Diabetes Mellitus, Family history, Dyslipidemia  Has dilated eye exam at least once a year?: Yes  Feet checked by healthcare provider in the last year?: Yes    Healthy Coping:  Healthy Coping Assessed Today: Yes  Emotional response to diabetes: Ready to learn  Informal Support system:: Family, Friends  Stage of change: ACTION  (Actively working towards change)  Support resources: In-person Offerings  Patient Activation Measure Survey Score:       No data to display                  Care Plan and Education Provided:  Healthy Eating: Balanced meals, Consistency in amount and timing of carbohydrate intake, Eating out, and Portion control, Being Active: Amount recommended (150 minutes moderate or 75 minutes vigorous activity and 2-3 days strength training per week) and Finding a physical activity routine that works for you, Taking Medication: Proper site selection and rotation for injections, Reducing Risks: Eye care, Goal for A1c, how it relates to glucose and how often to check, Preventing cardiovascular disease, including blood pressure goals, lipid goals, recommendations for cardioprotective medications, statins, and aspirin, Prevention, early diagnostic measures and treatment of complications, and A1C - goals, relating to blood glucose levels, how often to check, and Healthy Coping: Benefits of making appropriate lifestyle changes, Identifying helpful resources, Recognize feelings about diagnosis, and Utilize support systems    Thank you,  Sandrine Vu RN [facility]  Certified Diabetes Care and   Visit type : DSMT      Time Spent: 30 minutes  Encounter Type: Individual    Any diabetes medication dose changes were made via the CDE Protocol per the patient's referring provider and primary care provider. A copy of this encounter was shared with the provider.   Much or all of the text in this note was generated through the use of the Dragon Dictate voice-to-text software.Errors in spelling or words which seem out of context are unintentional. Sound alike errors, in particular, may have escaped editing.

## 2025-01-22 DIAGNOSIS — E11.9 TYPE 2 DIABETES, HBA1C GOAL < 8% (H): ICD-10-CM

## 2025-01-22 RX ORDER — INSULIN GLARGINE 100 [IU]/ML
47 INJECTION, SOLUTION SUBCUTANEOUS AT BEDTIME
Qty: 90 ML | Refills: 1 | Status: SHIPPED | OUTPATIENT
Start: 2025-01-22

## 2025-02-19 ENCOUNTER — TELEPHONE (OUTPATIENT)
Dept: PHARMACY | Facility: OTHER | Age: 85
End: 2025-02-19
Payer: COMMERCIAL

## 2025-02-19 NOTE — TELEPHONE ENCOUNTER
DAMIÁN Recruitment: [facility] insurance     Referral outreach attempt #1 on February 19, 2025      Outcome: left voicemail- Call back number [phone] and [employer] message sent    DAMIÁN Baez

## 2025-02-24 DIAGNOSIS — Z76.0 ENCOUNTER FOR MEDICATION REFILL: ICD-10-CM

## 2025-02-24 DIAGNOSIS — E78.00 HYPERCHOLESTEROLEMIA: ICD-10-CM

## 2025-02-24 RX ORDER — ATORVASTATIN CALCIUM 40 MG/1
40 TABLET, FILM COATED ORAL AT BEDTIME
Qty: 90 TABLET | Refills: 1 | Status: SHIPPED | OUTPATIENT
Start: 2025-02-24

## 2025-03-10 ENCOUNTER — ALLIED HEALTH/NURSE VISIT (OUTPATIENT)
Dept: EDUCATION SERVICES | Facility: CLINIC | Age: 85
End: 2025-03-10
Payer: COMMERCIAL

## 2025-03-10 ENCOUNTER — LAB (OUTPATIENT)
Dept: LAB | Facility: CLINIC | Age: 85
End: 2025-03-10
Payer: COMMERCIAL

## 2025-03-10 VITALS — WEIGHT: 179.2 LBS | BODY MASS INDEX: 28.84 KG/M2

## 2025-03-10 DIAGNOSIS — E11.9 TYPE 2 DIABETES, HBA1C GOAL < 8% (H): Primary | ICD-10-CM

## 2025-03-10 DIAGNOSIS — E11.9 TYPE 2 DIABETES, HBA1C GOAL < 8% (H): ICD-10-CM

## 2025-03-10 LAB
EST. AVERAGE GLUCOSE BLD GHB EST-MCNC: 120 MG/DL
HBA1C MFR BLD: 5.8 % (ref 0–5.6)

## 2025-03-10 PROCEDURE — 36415 COLL VENOUS BLD VENIPUNCTURE: CPT

## 2025-03-10 PROCEDURE — 99207 PR NO CHARGE NURSE ONLY: CPT

## 2025-03-10 PROCEDURE — 83036 HEMOGLOBIN GLYCOSYLATED A1C: CPT

## 2025-03-10 PROCEDURE — G0108 DIAB MANAGE TRN  PER INDIV: HCPCS

## 2025-03-10 NOTE — PROGRESS NOTES
Diabetes Self-Management Education & Support    Presents for: Individual review    Type of Service: In Person Visit      Assessment  Kelley is a very pleasant 84-year-old who returns to clinic today to review blood glucose, medications, recheck A1c, and to assess/assist her with her current diabetes self-management skills.  She arrived today unaccompanied and had her [employer] reader with her for download and review.  Diabetes medications were reviewed and Kelley confirms she continues to take 5 mg of Mounjaro SC weekly, Lantus SC daily-20 to 25 units, and Novolog 15 to 20 units before meals.  She reports having no missed or skipped doses of any of these medications.  However, based upon reports reviewed she still does tend to overcorrect slightly once in a while causing a blood glucose to go into the 60's.  Cautioned her about this and reminded her it is better to be under dosing than overdosing.  Prior to her visit today we did recheck her A1c and was extremely pleased to see it came back even lower from a previous 6.1% to 5.8% today!  I congratulated her on this and she stated that this is the lowest her A1c has been in over 20 years.  She is doing exceedingly well and I encouraged her to keep up the good work.  Kelley said she had no pertinent questions or concerns wanting to be addressed today so we agreed to meet back in clinic later in the summer and I instructed her to call with any questions or concerns that come up before that time.    Patient's most recent   Lab Results   Component Value Date    A1C 5.8 03/10/2025     is meeting goal of <8.0    Diabetes knowledge and skills assessment:   Patient is knowledgeable in diabetes management concepts related to: Healthy Eating, Being Active, Monitoring, Taking Medication, Reducing Risks, and Healthy Coping    Based on learning assessment above, most appropriate setting for further diabetes education would be: Individual setting.    PLAN  1. Eat 3 balanced meals each  day - Monitor carb intake and limit to 45-60 grams per meal  This would be equal to 3-4 choices ~  1 choice = 15 grams    Do not wait longer than 4-5 hours to eat something  Snacks limit to no more than 30 grams of carbohydrates or 2 choices  Make sure you include protein source with each meal and at bedtime - this has been shown to help with blood glucose elevations    2. Check blood sugars several  times each day   Fasting and before meal target is 80 - 140   2 hours after a meal target is < 180  remember to bring meter and log book to all appointments    3. Incorporate 30 minutes activity into each day - does not need to be all at one time & walking counts    4. Take diabetes medications as prescribed Continue Mounjaro 5 mg weekly, Lantus 20-25 units each night, and Novolog before meals 15-20 units      Topics to cover at upcoming visits: Healthy Eating, Being Active, Problem Solving, Reducing Risks, and Healthy Coping    Follow-up: 9/15/25    See Care Plan for co-developed, patient-state behavior change goals.  AVS provided for patient today.    Education Materials Provided:  No new materials provided today      SUBJECTIVE/OBJECTIVE:  Presents for: Individual review  Accompanied by: Self  Diabetes education in the past 24 mo: Yes (LV 12/2/24)  Focus of Visit: Monitoring, Taking Medication, Healthy Coping, Healthy Eating, Problem Solving, Self-care behavioral goal setting, Assistance w/ making life changes  Diabetes type: Type 2  Date of diagnosis: > 20 yrs  Disease course: Improving  Other concerns:: Access to technology, Visual impairment  Cultural Influences/Ethnic Background:  Not  or       Diabetes Symptoms & Complications:  Weight trend: Decreasing  Disease course: Improving  Complications assessed today?: No    Patient Problem List and Family Medical History reviewed for relevant medical history, current medical status, and diabetes risk factors.    Vitals:  Wt 81.3 kg (179 lb 3.2 oz)   LMP   "(LMP Unknown)   BMI 28.84 kg/m    Estimated body mass index is 28.84 kg/m  as calculated from the following:    Height as of 7/31/24: 1.679 m (5' 6.1\").    Weight as of this encounter: 81.3 kg (179 lb 3.2 oz).   Last 3 BP:   BP Readings from Last 3 Encounters:   07/31/24 126/68   07/20/23 138/65   12/19/22 120/62       History   Smoking Status    Never   Smokeless Tobacco    Never       Labs:  Lab Results   Component Value Date    A1C 5.8 03/10/2025     Lab Results   Component Value Date     07/31/2024     12/10/2021     Lab Results   Component Value Date    LDL 58 07/31/2024    LDL 54 12/07/2020    LDL 82 10/14/2019     Direct Measure HDL   Date Value Ref Range Status   12/07/2020 39 (L) >=50 mg/dL Final   ]  GFR Estimate   Date Value Ref Range Status   07/31/2024 67 >60 mL/min/1.73m2 Final     Comment:     eGFR calculated using 2021 CKD-EPI equation.   12/07/2020 50 (L) >60 mL/min/1.73m2 Final     GFR Estimate If Black   Date Value Ref Range Status   12/07/2020 60 (L) >60 mL/min/1.73m2 Final     Lab Results   Component Value Date    CR 0.86 07/31/2024     No results found for: \"MICROALBUMIN\"    Healthy Eating:  Healthy Eating Assessed Today: Yes  Cultural/M[employer]onism diet restrictions?: No  Do you have any food allergies or intolerances?: No  Meal planning/habits: Smaller portions  Who cooks/prepares meals for you?: Self  Who purchases food in  your home?: Self  How many times a week on average do you eat food made away from home (restaurant/take-out)?: 3  Meals include: Breakfast, Lunch, Dinner, Evening Snack  Breakfast: egg and toast or english muffin  Lunch: lately 1/2 rahul sandwich from [employer]  Dinner: + protein + veg + CHO  Snacks: handful of chips here and there  Other: noticed significant decrease in appetite with Mounjaro - does not snack as she once did  Beverages: Water, Tea, Coffee  Has patient met with a dietitian in the past?: No    Being Active:  Being Active Assessed Today: " "Yes  Exercise::  (walks daily - shoveled after snow storm last week 9\")    Monitoring:  Monitoring Assessed Today: Yes  Did patient bring glucose meter to appointment? : Yes  Blood Glucose Meter: CGM  Times checking blood sugar at home (number): 5+  Times checking blood sugar at home (per): Day    Glycemic control looks excellent.    Taking Medications:  Diabetes Medication(s)       Insulin       insulin aspart (NOVOLOG VIAL) 100 UNITS/ML vial Inject 50 Units Subcutaneous 3 times daily (with meals)     LANTUS VIAL 100 UNIT/ML soln Inject 47 Units subcutaneously at bedtime.       Incretin Mimetic Agents       tirzepatide (MOUNJARO) 5 MG/0.5ML pen Inject 5 mg Subcutaneous once a week            Taking Medication Assessed Today: Yes  Current Treatments: Non-insulin Injectables, Insulin Injections  Dose schedule: Pre-breakfast, Pre-lunch, Pre-dinner  Given by: Patient  Injection/Infusion sites: Abdomen  Problems taking diabetes medications regularly?: No  Diabetes medication side effects?: No    Problem Solving:  Problem Solving Assessed Today: Yes  Is the patient at risk for hypoglycemia?: Yes  Hypoglycemia Frequency: Monthly (sometimes she overcorrects with Novolog)  Hypoglycemia Treatment: Juice    Hypoglycemia Symptoms  Hypoglycemia: Feeling shaky, Nervousness/Anxiety    Hypoglycemia Complications  Hypoglycemia Complications: None    Reducing Risks:  Reducing Risks Assessed Today: Yes  Diabetes Risks: Age over 45 years, Sedentary Lifestyle  CAD Risks: Diabetes Mellitus, Sedentary lifestyle, Post-menopausal    Healthy Coping:  Healthy Coping Assessed Today: Yes  Emotional response to diabetes: Ready to learn  Stage of change: ACTION (Actively working towards change)  Support resources: In-person Offerings  Patient Activation Measure Survey Score:       No data to display                  Care Plan and Education Provided:  Healthy Eating: Balanced meals, Carbohydrate Counting, Consistency in amount and timing of " carbohydrate intake, Eating out, and Portion control, Being Active: Amount recommended (150 minutes moderate or 75 minutes vigorous activity and 2-3 days strength training per week), Finding a physical activity routine that works for you, and Relationship of activity to glucose, Monitoring: Frequency of monitoring and Individual glucose targets, Taking Medication: Action of prescribed medication(s), Proper site selection and rotation for injections, Side effects of prescribed medication(s), and When to take medication(s), Problem Solving: Low glucose - causes, signs/symptoms, treatment and prevention and Rule of 15 and carrying a carbohydrate source at all times in case of low glucose, Reducing Risks: Goal for A1c, how it relates to glucose and how often to check, Prevention, early diagnostic measures and treatment of complications, and A1C - goals, relating to blood glucose levels, how often to check, and Healthy Coping: Benefits of making appropriate lifestyle changes, Identifying helpful resources, and Utilize support systems    Thank you,  Sandrine Vu RN [facility]  Certified Diabetes Care and   Visit type : DSMT      Time Spent: 60 minutes  Encounter Type: Individual    Any diabetes medication dose changes were made via the CDE Protocol per the patient's referring provider and primary care provider. A copy of this encounter was shared with the provider.   Much or all of the text in this note was generated through the use of the Dragon Dictate voice-to-text software.Errors in spelling or words which seem out of context are unintentional. Sound alike errors, in particular, may have escaped editing.

## 2025-03-10 NOTE — LETTER
3/10/2025         RE: Kelley Styles  [address] [place] [address]        Dear Colleague,    Thank you for referring your patient, Kelley Styles, to the [facility]. Please see a copy of my visit note below.    Diabetes Self-Management Education & Support    Presents for: Individual review    Type of Service: In Person Visit      Assessment  Kelley is a very pleasant 84-year-old who returns to clinic today to review blood glucose, medications, recheck A1c, and to assess/assist her with her current diabetes self-management skills.  She arrived today unaccompanied and had her [employer] reader with her for download and review.  Diabetes medications were reviewed and Kelley confirms she continues to take 5 mg of Mounjaro SC weekly, Lantus SC daily-20 to 25 units, and Novolog 15 to 20 units before meals.  She reports having no missed or skipped doses of any of these medications.  However, based upon reports reviewed she still does tend to overcorrect slightly once in a while causing a blood glucose to go into the 60's.  Cautioned her about this and reminded her it is better to be under dosing than overdosing.  Prior to her visit today we did recheck her A1c and was extremely pleased to see it came back even lower from a previous 6.1% to 5.8% today!  I congratulated her on this and she stated that this is the lowest her A1c has been in over 20 years.  She is doing exceedingly well and I encouraged her to keep up the good work.  Kelley said she had no pertinent questions or concerns wanting to be addressed today so we agreed to meet back in clinic later in the summer and I instructed her to call with any questions or concerns that come up before that time.    Patient's most recent   Lab Results   Component Value Date    A1C 5.8 03/10/2025     is meeting goal of <8.0    Diabetes knowledge and skills assessment:   Patient is knowledgeable in diabetes management concepts  related to: Healthy Eating, Being Active, Monitoring, Taking Medication, Reducing Risks, and Healthy Coping    Based on learning assessment above, most appropriate setting for further diabetes education would be: Individual setting.    PLAN  1. Eat 3 balanced meals each day - Monitor carb intake and limit to 45-60 grams per meal  This would be equal to 3-4 choices ~  1 choice = 15 grams    Do not wait longer than 4-5 hours to eat something  Snacks limit to no more than 30 grams of carbohydrates or 2 choices  Make sure you include protein source with each meal and at bedtime - this has been shown to help with blood glucose elevations    2. Check blood sugars several  times each day   Fasting and before meal target is 80 - 140   2 hours after a meal target is < 180  remember to bring meter and log book to all appointments    3. Incorporate 30 minutes activity into each day - does not need to be all at one time & walking counts    4. Take diabetes medications as prescribed Continue Mounjaro 5 mg weekly, Lantus 20-25 units each night, and Novolog before meals 15-20 units      Topics to cover at upcoming visits: Healthy Eating, Being Active, Problem Solving, Reducing Risks, and Healthy Coping    Follow-up: 9/15/25    See Care Plan for co-developed, patient-state behavior change goals.  AVS provided for patient today.    Education Materials Provided:  No new materials provided today      SUBJECTIVE/OBJECTIVE:  Presents for: Individual review  Accompanied by: Self  Diabetes education in the past 24 mo: Yes (LV 12/2/24)  Focus of Visit: Monitoring, Taking Medication, Healthy Coping, Healthy Eating, Problem Solving, Self-care behavioral goal setting, Assistance w/ making life changes  Diabetes type: Type 2  Date of diagnosis: > 20 yrs  Disease course: Improving  Other concerns:: Access to technology, Visual impairment  Cultural Influences/Ethnic Background:  Not  or       Diabetes Symptoms &  "Complications:  Weight trend: Decreasing  Disease course: Improving  Complications assessed today?: No    Patient Problem List and Family Medical History reviewed for relevant medical history, current medical status, and diabetes risk factors.    Vitals:  Wt 81.3 kg (179 lb 3.2 oz)   LMP  (LMP Unknown)   BMI 28.84 kg/m    Estimated body mass index is 28.84 kg/m  as calculated from the following:    Height as of 7/31/24: 1.679 m (5' 6.1\").    Weight as of this encounter: 81.3 kg (179 lb 3.2 oz).   Last 3 BP:   BP Readings from Last 3 Encounters:   07/31/24 126/68   07/20/23 138/65   12/19/22 120/62       History   Smoking Status     Never   Smokeless Tobacco     Never       Labs:  Lab Results   Component Value Date    A1C 5.8 03/10/2025     Lab Results   Component Value Date     07/31/2024     12/10/2021     Lab Results   Component Value Date    LDL 58 07/31/2024    LDL 54 12/07/2020    LDL 82 10/14/2019     Direct Measure HDL   Date Value Ref Range Status   12/07/2020 39 (L) >=50 mg/dL Final   ]  GFR Estimate   Date Value Ref Range Status   07/31/2024 67 >60 mL/min/1.73m2 Final     Comment:     eGFR calculated using 2021 CKD-EPI equation.   12/07/2020 50 (L) >60 mL/min/1.73m2 Final     GFR Estimate If Black   Date Value Ref Range Status   12/07/2020 60 (L) >60 mL/min/1.73m2 Final     Lab Results   Component Value Date    CR 0.86 07/31/2024     No results found for: \"MICROALBUMIN\"    Healthy Eating:  Healthy Eating Assessed Today: Yes  Cultural/[employer]ology diet restrictions?: No  Do you have any food allergies or intolerances?: No  Meal planning/habits: Smaller portions  Who cooks/prepares meals for you?: Self  Who purchases food in  your home?: Self  How many times a week on average do you eat food made away from home (restaurant/take-out)?: 3  Meals include: Breakfast, Lunch, Dinner, Evening Snack  Breakfast: egg and toast or english muffin  Lunch: lately 1/2 rahul sandwich from [employer]  Dinner: + " "protein + veg + CHO  Snacks: handful of chips here and there  Other: noticed significant decrease in appetite with Mounjaro - does not snack as she once did  Beverages: Water, Tea, Coffee  Has patient met with a dietitian in the past?: No    Being Active:  Being Active Assessed Today: Yes  Exercise::  (walks daily - shoveled after snow storm last week 9\")    Monitoring:  Monitoring Assessed Today: Yes  Did patient bring glucose meter to appointment? : Yes  Blood Glucose Meter: CGM  Times checking blood sugar at home (number): 5+  Times checking blood sugar at home (per): Day    Glycemic control looks excellent.    Taking Medications:  Diabetes Medication(s)       Insulin       insulin aspart (NOVOLOG VIAL) 100 UNITS/ML vial Inject 50 Units Subcutaneous 3 times daily (with meals)     LANTUS VIAL 100 UNIT/ML soln Inject 47 Units subcutaneously at bedtime.       Incretin Mimetic Agents       tirzepatide (MOUNJARO) 5 MG/0.5ML pen Inject 5 mg Subcutaneous once a week            Taking Medication Assessed Today: Yes  Current Treatments: Non-insulin Injectables, Insulin Injections  Dose schedule: Pre-breakfast, Pre-lunch, Pre-dinner  Given by: Patient  Injection/Infusion sites: Abdomen  Problems taking diabetes medications regularly?: No  Diabetes medication side effects?: No    Problem Solving:  Problem Solving Assessed Today: Yes  Is the patient at risk for hypoglycemia?: Yes  Hypoglycemia Frequency: Monthly (sometimes she overcorrects with Novolog)  Hypoglycemia Treatment: Juice    Hypoglycemia Symptoms  Hypoglycemia: Feeling shaky, Nervousness/Anxiety    Hypoglycemia Complications  Hypoglycemia Complications: None    Reducing Risks:  Reducing Risks Assessed Today: Yes  Diabetes Risks: Age over 45 years, Sedentary Lifestyle  CAD Risks: Diabetes Mellitus, Sedentary lifestyle, Post-menopausal    Healthy Coping:  Healthy Coping Assessed Today: Yes  Emotional response to diabetes: Ready to learn  Stage of change: ACTION " (Actively working towards change)  Support resources: In-person Offerings  Patient Activation Measure Survey Score:       No data to display                  Care Plan and Education Provided:  Healthy Eating: Balanced meals, Carbohydrate Counting, Consistency in amount and timing of carbohydrate intake, Eating out, and Portion control, Being Active: Amount recommended (150 minutes moderate or 75 minutes vigorous activity and 2-3 days strength training per week), Finding a physical activity routine that works for you, and Relationship of activity to glucose, Monitoring: Frequency of monitoring and Individual glucose targets, Taking Medication: Action of prescribed medication(s), Proper site selection and rotation for injections, Side effects of prescribed medication(s), and When to take medication(s), Problem Solving: Low glucose - causes, signs/symptoms, treatment and prevention and Rule of 15 and carrying a carbohydrate source at all times in case of low glucose, Reducing Risks: Goal for A1c, how it relates to glucose and how often to check, Prevention, early diagnostic measures and treatment of complications, and A1C - goals, relating to blood glucose levels, how often to check, and Healthy Coping: Benefits of making appropriate lifestyle changes, Identifying helpful resources, and Utilize support systems    Thank you,  Sandrine Vu RN [facility]  Certified Diabetes Care and   Visit type : DSMT      Time Spent: 60 minutes  Encounter Type: Individual    Any diabetes medication dose changes were made via the CDE Protocol per the patient's referring provider and primary care provider. A copy of this encounter was shared with the provider.   Much or all of the text in this note was generated through the use of the Dragon Dictate voice-to-text software.Errors in spelling or words which seem out of context are unintentional. Sound alike errors, in particular, may have escaped editing.

## 2025-03-10 NOTE — PATIENT INSTRUCTIONS
1. Eat 3 balanced meals each day - Monitor carb intake and limit to 45-60 grams per meal  This would be equal to 3-4 choices ~  1 choice = 15 grams    Do not wait longer than 4-5 hours to eat something  Snacks limit to no more than 30 grams of carbohydrates or 2 choices  Make sure you include protein source with each meal and at bedtime - this has been shown to help with blood glucose elevations    2. Check blood sugars several  times each day   Fasting and before meal target is 80 - 140   2 hours after a meal target is < 180  remember to bring meter and log book to all appointments    3. Incorporate 30 minutes activity into each day - does not need to be all at one time & walking counts    4. Take diabetes medications as prescribed Continue Mounjaro 5 mg weekly, Lantus 20-25 units each night, and Novolog before meals 15-20 units    A1c today was 5.8%  LOWEST EVER !!!!!! SCOOTER BAEZ !       Thank you for coming in to see me today !      I value your experience and would be very thankful for your time in providing feedback in our clinic survey.    You may receive an e-mail, text message or even something in the mail from [facility].  This is a survey to let us know how we are doing - the survey will be related to your diabetes education and me.

## 2025-04-17 ENCOUNTER — TELEPHONE (OUTPATIENT)
Dept: PHARMACY | Facility: OTHER | Age: 85
End: 2025-04-17
Payer: COMMERCIAL

## 2025-04-17 NOTE — TELEPHONE ENCOUNTER
MTM Recruitment: [facility] insurance     Referral outreach attempt #2 on April 17, 2025      Outcome: patient opted out    DAMIÁN Baez

## 2025-04-29 DIAGNOSIS — E11.9 TYPE 2 DIABETES, HBA1C GOAL < 8% (H): ICD-10-CM

## 2025-05-05 DIAGNOSIS — E11.9 TYPE 2 DIABETES, HBA1C GOAL < 8% (H): ICD-10-CM

## 2025-05-05 RX ORDER — TIRZEPATIDE 5 MG/.5ML
5 INJECTION, SOLUTION SUBCUTANEOUS WEEKLY
Qty: 2 ML | Refills: 3 | Status: SHIPPED | OUTPATIENT
Start: 2025-05-05

## 2025-05-15 ENCOUNTER — TELEPHONE (OUTPATIENT)
Dept: INTERNAL MEDICINE | Facility: CLINIC | Age: 85
End: 2025-05-15
Payer: COMMERCIAL

## 2025-05-15 DIAGNOSIS — E11.9 TYPE 2 DIABETES, HBA1C GOAL < 8% (H): ICD-10-CM

## 2025-05-15 NOTE — TELEPHONE ENCOUNTER
Please route determinations to the Pharm Diabetes pool ([id number]).      Thank you!    Diabetes Care Services Team   [facility]  [address] [place] [address]

## 2025-05-15 NOTE — TELEPHONE ENCOUNTER
Please actually start prior authorization for Freestyle Liane 2 plus sensor as regular liane 2 will be discontinued on September of this year.      Thank you!     Diabetes Care Services Team   [facility]  [facility] [place] [address]

## 2025-06-03 DIAGNOSIS — E11.9 TYPE 2 DIABETES MELLITUS WITHOUT COMPLICATION, WITH LONG-TERM CURRENT USE OF INSULIN (H): ICD-10-CM

## 2025-06-03 DIAGNOSIS — Z79.4 TYPE 2 DIABETES MELLITUS WITHOUT COMPLICATION, WITH LONG-TERM CURRENT USE OF INSULIN (H): ICD-10-CM

## 2025-06-03 RX ORDER — INSULIN ASPART 100 [IU]/ML
50 INJECTION, SOLUTION INTRAVENOUS; SUBCUTANEOUS
Qty: 140 ML | Refills: 3 | Status: SHIPPED | OUTPATIENT
Start: 2025-06-03

## 2025-06-03 NOTE — TELEPHONE ENCOUNTER
"3/10/25 Diabetes Education Visit note: \"Diabetes medications were reviewed and Kelley confirms she continues to take 5 mg of Mounjaro SC weekly, Lantus SC daily-20 to 25 units, and Novolog 15 to 20 units before meals\".  "

## 2025-06-03 NOTE — TELEPHONE ENCOUNTER
Patient returning call, confirms that she takes 15-20 units of Novolog prior to meal times. She is requesting that the sig remain the same on the prescription as she does have to take more than 20 units at times, reports this intermittent increased dosing is not a change for her and she has previously discussed this with diabetic educator. Routing to PCP for review.    Yoko Stallings RN

## 2025-06-03 NOTE — TELEPHONE ENCOUNTER
1/3 TRC.  When pt returns call, please verify number of units she is currently using of insulin aspart (Novolog). Route Rx request to PCP and request sig is updated if applicable.

## 2025-07-16 ENCOUNTER — TELEPHONE (OUTPATIENT)
Dept: INTERNAL MEDICINE | Facility: CLINIC | Age: 85
End: 2025-07-16
Payer: COMMERCIAL

## 2025-07-16 DIAGNOSIS — E11.9 TYPE 2 DIABETES, HBA1C GOAL < 8% (H): ICD-10-CM

## 2025-07-16 RX ORDER — TIRZEPATIDE 5 MG/.5ML
5 INJECTION, SOLUTION SUBCUTANEOUS WEEKLY
Qty: 6 ML | Refills: 0 | Status: SHIPPED | OUTPATIENT
Start: 2025-07-16

## 2025-07-16 NOTE — TELEPHONE ENCOUNTER
Medication Question or Refill        What medication are you calling about (include dose and sig)?:   tirzepatide (MOUNJARO) 5 MG/0.5ML SOAJ auto-injector pen 2 mL 3 5/5/2025 -- No   Sig - Route: Inject 0.5 mLs (5 mg) subcutaneously once a week. - Subcutaneous     Do you have any questions or concerns?  Patient requesting that all future prescriptions for this medication be for a 90 day supply. States this most written rx was only for 30 days at a time and she doesn't want to keep calling to request it.      Could we send this information to you in [employer] or would you prefer to receive a phone call?:   Patient would prefer a phone call     Okay to leave a detailed message?: N/A at Home number on file [phone] (home)      Preferred Pharmacy:    [id number] - [facility] [place] - [address]  [address]  [facility] [place] [address]  Phone: [phone] Fax: [phone]      Controlled Substance Agreement on file:   CSA -- Patient Level:    CSA: None found at the patient level.

## 2025-07-22 DIAGNOSIS — E11.9 TYPE 2 DIABETES, HBA1C GOAL < 8% (H): ICD-10-CM

## 2025-07-22 RX ORDER — INSULIN GLARGINE 100 [IU]/ML
47 INJECTION, SOLUTION SUBCUTANEOUS AT BEDTIME
Qty: 90 ML | Refills: 1 | Status: SHIPPED | OUTPATIENT
Start: 2025-07-22

## 2025-08-05 ENCOUNTER — OFFICE VISIT (OUTPATIENT)
Dept: INTERNAL MEDICINE | Facility: CLINIC | Age: 85
End: 2025-08-05
Payer: COMMERCIAL

## 2025-08-05 VITALS
HEIGHT: 66 IN | RESPIRATION RATE: 16 BRPM | WEIGHT: 173 LBS | OXYGEN SATURATION: 98 % | DIASTOLIC BLOOD PRESSURE: 60 MMHG | TEMPERATURE: 98 F | SYSTOLIC BLOOD PRESSURE: 118 MMHG | HEART RATE: 81 BPM | BODY MASS INDEX: 27.8 KG/M2

## 2025-08-05 DIAGNOSIS — Z23 NEED FOR VACCINATION: Primary | ICD-10-CM

## 2025-08-05 DIAGNOSIS — I10 ESSENTIAL HYPERTENSION: ICD-10-CM

## 2025-08-05 DIAGNOSIS — E11.9 TYPE 2 DIABETES, HBA1C GOAL < 8% (H): ICD-10-CM

## 2025-08-05 DIAGNOSIS — M25.551 HIP PAIN, RIGHT: ICD-10-CM

## 2025-08-05 DIAGNOSIS — G47.9 SLEEP DISORDER: ICD-10-CM

## 2025-08-05 DIAGNOSIS — E78.5 HYPERLIPIDEMIA LDL GOAL <100: ICD-10-CM

## 2025-08-05 DIAGNOSIS — M17.0 PRIMARY OSTEOARTHRITIS OF BOTH KNEES: ICD-10-CM

## 2025-08-05 DIAGNOSIS — M94.9 DISORDER OF BONE AND CARTILAGE: ICD-10-CM

## 2025-08-05 DIAGNOSIS — M89.9 DISORDER OF BONE AND CARTILAGE: ICD-10-CM

## 2025-08-05 PROBLEM — R05.2 SUBACUTE COUGH: Status: RESOLVED | Noted: 2022-12-19 | Resolved: 2025-08-05

## 2025-08-05 PROBLEM — N93.9 VAGINAL SPOTTING: Status: RESOLVED | Noted: 2021-12-13 | Resolved: 2025-08-05

## 2025-08-05 PROCEDURE — G2211 COMPLEX E/M VISIT ADD ON: HCPCS | Performed by: INTERNAL MEDICINE

## 2025-08-05 PROCEDURE — 99214 OFFICE O/P EST MOD 30 MIN: CPT | Mod: 25 | Performed by: INTERNAL MEDICINE

## 2025-08-05 PROCEDURE — 3074F SYST BP LT 130 MM HG: CPT | Performed by: INTERNAL MEDICINE

## 2025-08-05 PROCEDURE — G0439 PPPS, SUBSEQ VISIT: HCPCS | Performed by: INTERNAL MEDICINE

## 2025-08-05 PROCEDURE — 3078F DIAST BP <80 MM HG: CPT | Performed by: INTERNAL MEDICINE

## 2025-08-05 RX ORDER — ERYTHROMYCIN 5 MG/G
OINTMENT OPHTHALMIC
COMMUNITY
Start: 2025-05-22

## 2025-08-05 RX ORDER — DIFLUPREDNATE OPHTHALMIC 0.5 MG/ML
EMULSION OPHTHALMIC
COMMUNITY
Start: 2025-07-22

## 2025-08-05 RX ORDER — NETARSUDIL 0.2 MG/ML
SOLUTION/ DROPS OPHTHALMIC; TOPICAL
COMMUNITY
Start: 2025-07-23

## 2025-08-05 SDOH — HEALTH STABILITY: PHYSICAL HEALTH: ON AVERAGE, HOW MANY MINUTES DO YOU ENGAGE IN EXERCISE AT THIS LEVEL?: 0 MIN

## 2025-08-05 SDOH — HEALTH STABILITY: PHYSICAL HEALTH: ON AVERAGE, HOW MANY DAYS PER WEEK DO YOU ENGAGE IN MODERATE TO STRENUOUS EXERCISE (LIKE A BRISK WALK)?: 0 DAYS

## 2025-08-05 ASSESSMENT — SOCIAL DETERMINANTS OF HEALTH (SDOH): HOW OFTEN DO YOU GET TOGETHER WITH FRIENDS OR RELATIVES?: NEVER

## 2025-08-06 ENCOUNTER — PATIENT OUTREACH (OUTPATIENT)
Dept: CARE COORDINATION | Facility: CLINIC | Age: 85
End: 2025-08-06
Payer: COMMERCIAL

## 2025-08-07 ENCOUNTER — ANCILLARY PROCEDURE (OUTPATIENT)
Dept: GENERAL RADIOLOGY | Facility: CLINIC | Age: 85
End: 2025-08-07
Attending: ORTHOPAEDIC SURGERY
Payer: COMMERCIAL

## 2025-08-07 ENCOUNTER — OFFICE VISIT (OUTPATIENT)
Dept: ORTHOPEDICS | Facility: CLINIC | Age: 85
End: 2025-08-07
Attending: INTERNAL MEDICINE
Payer: COMMERCIAL

## 2025-08-07 ENCOUNTER — PRE VISIT (OUTPATIENT)
Dept: ORTHOPEDICS | Facility: CLINIC | Age: 85
End: 2025-08-07

## 2025-08-07 VITALS — BODY MASS INDEX: 27.8 KG/M2 | WEIGHT: 173 LBS | HEIGHT: 66 IN

## 2025-08-07 DIAGNOSIS — M25.551 HIP PAIN, RIGHT: ICD-10-CM

## 2025-08-07 ASSESSMENT — HOOS JR
RISING FROM SITTING: MILD
HOOS JR TOTAL INTERVAL SCORE: 85.26
SITTING: MILD

## 2025-08-13 ENCOUNTER — DOCUMENTATION ONLY (OUTPATIENT)
Dept: OTHER | Facility: CLINIC | Age: 85
End: 2025-08-13
Payer: COMMERCIAL

## 2025-08-16 ENCOUNTER — HEALTH MAINTENANCE LETTER (OUTPATIENT)
Age: 85
End: 2025-08-16

## 2025-08-19 ENCOUNTER — THERAPY VISIT (OUTPATIENT)
Dept: PHYSICAL THERAPY | Facility: CLINIC | Age: 85
End: 2025-08-19
Attending: ORTHOPAEDIC SURGERY
Payer: COMMERCIAL

## 2025-08-19 DIAGNOSIS — M25.551 HIP PAIN, RIGHT: ICD-10-CM

## 2025-08-19 PROCEDURE — 97161 PT EVAL LOW COMPLEX 20 MIN: CPT | Mod: GP

## 2025-08-19 PROCEDURE — 97140 MANUAL THERAPY 1/> REGIONS: CPT | Mod: GP

## 2025-08-19 PROCEDURE — 97110 THERAPEUTIC EXERCISES: CPT | Mod: GP

## 2025-08-23 DIAGNOSIS — I10 ESSENTIAL HYPERTENSION: ICD-10-CM

## 2025-08-25 RX ORDER — LISINOPRIL AND HYDROCHLOROTHIAZIDE 10; 12.5 MG/1; MG/1
TABLET ORAL
Qty: 90 TABLET | Refills: 3 | Status: SHIPPED | OUTPATIENT
Start: 2025-08-25